# Patient Record
Sex: MALE | Race: OTHER | HISPANIC OR LATINO | Employment: OTHER | URBAN - METROPOLITAN AREA
[De-identification: names, ages, dates, MRNs, and addresses within clinical notes are randomized per-mention and may not be internally consistent; named-entity substitution may affect disease eponyms.]

---

## 2018-05-16 LAB
ABSOL LYMPHOCYTES (HISTORICAL): 3.7 K/UL (ref 0.5–4)
ALBUMIN SERPL BCP-MCNC: 4.3 G/DL (ref 3–5.2)
ALP SERPL-CCNC: 69 U/L (ref 43–122)
ALT SERPL W P-5'-P-CCNC: 24 U/L (ref 9–52)
ANION GAP SERPL CALCULATED.3IONS-SCNC: 9 MMOL/L (ref 5–14)
AST SERPL W P-5'-P-CCNC: 21 U/L (ref 17–59)
BASOPHILS # BLD AUTO: 0.1 K/UL (ref 0–0.1)
BASOPHILS # BLD AUTO: 1 % (ref 0–1)
BILIRUB SERPL-MCNC: 0.5 MG/DL
BUN SERPL-MCNC: 18 MG/DL (ref 5–25)
CALCIUM SERPL-MCNC: 9.6 MG/DL (ref 8.4–10.2)
CHLORIDE SERPL-SCNC: 103 MEQ/L (ref 97–108)
CHOLEST SERPL-MCNC: 144 MG/DL
CHOLEST/HDLC SERPL: 3.9 {RATIO}
CO2 SERPL-SCNC: 31 MMOL/L (ref 22–30)
CREATINE, SERUM (HISTORICAL): 0.88 MG/DL (ref 0.7–1.5)
DEPRECATED RDW RBC AUTO: 15.5 %
EGFR (HISTORICAL): >60 ML/MIN/1.73 M2
EOSINOPHIL # BLD AUTO: 0.3 K/UL (ref 0–0.4)
EOSINOPHIL NFR BLD AUTO: 3 % (ref 0–6)
EST. AVERAGE GLUCOSE BLD GHB EST-MCNC: 128 MG/DL
GLUCOSE SERPL-MCNC: 103 MG/DL (ref 70–99)
HBA1C MFR BLD HPLC: 6.1 %
HCT VFR BLD AUTO: 43.8 % (ref 41–53)
HDLC SERPL-MCNC: 37 MG/DL
HGB BLD-MCNC: 14.3 G/DL (ref 13.5–17.5)
LDL/HDL RATIO (HISTORICAL): 2.4
LDLC SERPL CALC-MCNC: 88 MG/DL
LYMPHOCYTES NFR BLD AUTO: 32 % (ref 25–45)
MCH RBC QN AUTO: 26.5 PG (ref 26–34)
MCHC RBC AUTO-ENTMCNC: 32.6 % (ref 31–36)
MCV RBC AUTO: 81 FL (ref 80–100)
MONOCYTES # BLD AUTO: 0.9 K/UL (ref 0.2–0.9)
MONOCYTES NFR BLD AUTO: 8 % (ref 1–10)
NEUTROPHILS ABS COUNT (HISTORICAL): 6.5 K/UL (ref 1.8–7.8)
NEUTS SEG NFR BLD AUTO: 56 % (ref 45–65)
PLATELET # BLD AUTO: 288 K/MCL (ref 150–450)
POTASSIUM SERPL-SCNC: 4.8 MEQ/L (ref 3.6–5)
PROSTATE SPECIFIC ANTIGEN (HISTORICAL): 1.13 NG/ML
RBC # BLD AUTO: 5.39 M/MCL (ref 4.5–5.9)
SODIUM SERPL-SCNC: 143 MEQ/L (ref 137–147)
TOTAL PROTEIN (HISTORICAL): 7.3 G/DL (ref 5.9–8.4)
TRIGL SERPL-MCNC: 97 MG/DL
VLDLC SERPL CALC-MCNC: 19 MG/DL (ref 0–40)
WBC # BLD AUTO: 11.6 K/MCL (ref 4.5–11)

## 2018-07-18 ENCOUNTER — TELEPHONE (OUTPATIENT)
Dept: FAMILY MEDICINE CLINIC | Facility: CLINIC | Age: 63
End: 2018-07-18

## 2018-07-19 ENCOUNTER — OFFICE VISIT (OUTPATIENT)
Dept: FAMILY MEDICINE CLINIC | Facility: CLINIC | Age: 63
End: 2018-07-19
Payer: COMMERCIAL

## 2018-07-19 VITALS
OXYGEN SATURATION: 99 % | TEMPERATURE: 98.3 F | SYSTOLIC BLOOD PRESSURE: 160 MMHG | HEART RATE: 75 BPM | BODY MASS INDEX: 40.8 KG/M2 | RESPIRATION RATE: 16 BRPM | WEIGHT: 285 LBS | HEIGHT: 70 IN | DIASTOLIC BLOOD PRESSURE: 94 MMHG

## 2018-07-19 DIAGNOSIS — I10 ESSENTIAL HYPERTENSION: ICD-10-CM

## 2018-07-19 DIAGNOSIS — R36.1 HEMATOSPERMIA: Primary | ICD-10-CM

## 2018-07-19 LAB
SL AMB  POCT GLUCOSE, UA: NORMAL
SL AMB LEUKOCYTE ESTERASE,UA: NEGATIVE
SL AMB POCT BILIRUBIN,UA: NEGATIVE
SL AMB POCT BLOOD,UA: 250
SL AMB POCT CLARITY,UA: CLEAR
SL AMB POCT COLOR,UA: YELLOW
SL AMB POCT KETONES,UA: NEGATIVE
SL AMB POCT NITRITE,UA: NEGATIVE
SL AMB POCT PH,UA: 7
SL AMB POCT SPECIFIC GRAVITY,UA: 1
SL AMB POCT URINE PROTEIN: ABNORMAL
SL AMB POCT UROBILINOGEN: NORMAL

## 2018-07-19 PROCEDURE — 3008F BODY MASS INDEX DOCD: CPT | Performed by: PHYSICIAN ASSISTANT

## 2018-07-19 PROCEDURE — 81002 URINALYSIS NONAUTO W/O SCOPE: CPT | Performed by: PHYSICIAN ASSISTANT

## 2018-07-19 PROCEDURE — 99213 OFFICE O/P EST LOW 20 MIN: CPT | Performed by: PHYSICIAN ASSISTANT

## 2018-07-19 PROCEDURE — 87491 CHLMYD TRACH DNA AMP PROBE: CPT | Performed by: PHYSICIAN ASSISTANT

## 2018-07-19 PROCEDURE — 87591 N.GONORRHOEAE DNA AMP PROB: CPT | Performed by: PHYSICIAN ASSISTANT

## 2018-07-19 RX ORDER — LISINOPRIL 30 MG/1
30 TABLET ORAL DAILY
Qty: 30 TABLET | Refills: 2 | Status: SHIPPED | OUTPATIENT
Start: 2018-07-19 | End: 2019-02-28

## 2018-07-19 NOTE — PROGRESS NOTES
Assessment/Plan:    Hematospermia  - explained that many times, isolated episodes of hematospermia can be due to trauma during intercourse and is usually benign   - due to age, will order a PSA to check the prostate   - order ultrasound of testicles and scrotum with views of epididymis to make sure that there are no abnormalities or inflammation of any structures   - will send urine for chlamydia and gonorrhea cultures        Diagnoses and all orders for this visit:    Hematospermia  -     US scrotum and testicles; Future  -     POCT urine dip  -     PSA, total and free; Future  -     Chlamydia/GC amplified DNA by PCR; Future        Subjective: Patient coming in because of noticing blood in his semen  He has noticed this on two occasions- last month, and again this weekend  He only notices this after intercourse, but does not notice any blood in semen with masturbation  He denies pain with urination, frequency, hematuria, and flank pain  Denies fevers and chills  Denies nausea and vomiting  He uses a condom with intercourse  He only has relations with his wife, but says that since she was in [facility] by herself for awhile, he does worry about STDs  Patient ID: Aramis Montoya is a 59 y o  male  HPI    The following portions of the patient's history were reviewed and updated as appropriate: He  has no past medical history on file  Patient Active Problem List    Diagnosis Date Noted    Hematospermia 07/19/2018     He  has no past surgical history on file  His family history is not on file  He  reports that he has quit smoking  He has quit using smokeless tobacco  His alcohol and drug histories are not on file  No current outpatient prescriptions on file  No current facility-administered medications for this visit  No current outpatient prescriptions on file prior to visit  No current facility-administered medications on file prior to visit  He is allergic to lisinopril       Review of Systems   Constitutional: Negative  Respiratory: Negative for shortness of breath  Cardiovascular: Negative for chest pain and palpitations  Gastrointestinal: Negative for abdominal distention, abdominal pain, nausea and vomiting  Genitourinary: Negative for decreased urine volume, difficulty urinating, dysuria, frequency, hematuria, penile pain, penile swelling, scrotal swelling, testicular pain and urgency  Musculoskeletal: Negative for back pain  Skin: Negative for color change and pallor  Neurological: Negative for dizziness  Objective:      /94 (BP Location: Left arm, Patient Position: Sitting, Cuff Size: Adult)   Pulse 75   Temp 98 3 °F (36 8 °C) (Temporal)   Resp 16   Ht 5' 10" (1 778 m)   Wt 129 kg (285 lb)   SpO2 99%   BMI 40 89 kg/m²        Physical Exam   Constitutional: He is oriented to person, place, and time  He appears well-developed and well-nourished  No distress  Neck: Normal range of motion  Neck supple  Cardiovascular: Normal rate, regular rhythm and normal heart sounds  Pulmonary/Chest: Effort normal and breath sounds normal  No respiratory distress  Abdominal: Soft  He exhibits no distension  There is no tenderness  Genitourinary:   Genitourinary Comments: No CVA tenderness    Neurological: He is alert and oriented to person, place, and time  Skin: Skin is warm and dry  He is not diaphoretic

## 2018-07-19 NOTE — ASSESSMENT & PLAN NOTE
- explained that many times, isolated episodes of hematospermia can be due to trauma during intercourse and is usually benign   - due to age, will order a PSA to check the prostate   - order ultrasound of testicles and scrotum with views of epididymis to make sure that there are no abnormalities or inflammation of any structures   - will send urine for chlamydia and gonorrhea cultures

## 2018-07-23 ENCOUNTER — TRANSCRIBE ORDERS (OUTPATIENT)
Dept: ADMINISTRATIVE | Facility: HOSPITAL | Age: 63
End: 2018-07-23

## 2018-07-23 LAB
CHLAMYDIA DNA CVX QL NAA+PROBE: NORMAL
N GONORRHOEA DNA GENITAL QL NAA+PROBE: NORMAL

## 2018-07-24 ENCOUNTER — TELEPHONE (OUTPATIENT)
Dept: FAMILY MEDICINE CLINIC | Facility: CLINIC | Age: 63
End: 2018-07-24

## 2018-08-07 ENCOUNTER — TELEPHONE (OUTPATIENT)
Dept: FAMILY MEDICINE CLINIC | Facility: CLINIC | Age: 63
End: 2018-08-07

## 2019-02-19 ENCOUNTER — TELEPHONE (OUTPATIENT)
Dept: FAMILY MEDICINE CLINIC | Facility: CLINIC | Age: 64
End: 2019-02-19

## 2019-02-19 NOTE — TELEPHONE ENCOUNTER
Pt was informed verbally of frequent no shows, pt was also informed if unable to show to appt to contact office to reschedule or cancel  Pt agreed

## 2019-02-28 ENCOUNTER — OFFICE VISIT (OUTPATIENT)
Dept: FAMILY MEDICINE CLINIC | Facility: CLINIC | Age: 64
End: 2019-02-28

## 2019-02-28 VITALS
HEIGHT: 70 IN | OXYGEN SATURATION: 98 % | WEIGHT: 285 LBS | SYSTOLIC BLOOD PRESSURE: 180 MMHG | BODY MASS INDEX: 40.8 KG/M2 | TEMPERATURE: 96.9 F | DIASTOLIC BLOOD PRESSURE: 90 MMHG | HEART RATE: 96 BPM | RESPIRATION RATE: 18 BRPM

## 2019-02-28 DIAGNOSIS — I10 ESSENTIAL HYPERTENSION: Primary | ICD-10-CM

## 2019-02-28 DIAGNOSIS — E66.01 CLASS 3 SEVERE OBESITY DUE TO EXCESS CALORIES WITH BODY MASS INDEX (BMI) OF 40.0 TO 44.9 IN ADULT, UNSPECIFIED WHETHER SERIOUS COMORBIDITY PRESENT (HCC): ICD-10-CM

## 2019-02-28 PROBLEM — E66.813 CLASS 3 SEVERE OBESITY DUE TO EXCESS CALORIES WITH BODY MASS INDEX (BMI) OF 40.0 TO 44.9 IN ADULT (HCC): Status: ACTIVE | Noted: 2019-02-28

## 2019-02-28 PROCEDURE — 99213 OFFICE O/P EST LOW 20 MIN: CPT | Performed by: PHYSICIAN ASSISTANT

## 2019-02-28 RX ORDER — LISINOPRIL 40 MG/1
40 TABLET ORAL DAILY
Qty: 30 TABLET | Refills: 2 | Status: SHIPPED | OUTPATIENT
Start: 2019-02-28 | End: 2019-05-31 | Stop reason: SDUPTHER

## 2019-02-28 NOTE — ASSESSMENT & PLAN NOTE
- BMI today: 40 89  - Continue walking every day  Encouraged to exercise at least 30 minutes a day, for 5 days a week  -Counseled patient on the importance of working to achieve weight reduction goal  Discussed benefits of weight loss including prevention or control of comorbidities  Discussed role that balanced diet and daily activity play in weight reduction  Set up small attainable weight loss goals  Involve family, friends, and co-workers for support  - Will continue to monitor

## 2019-02-28 NOTE — PROGRESS NOTES
Subjective:     Patient ID: Pamela Medel  is a 62 y o  male with known PMH of HTN who presents today in clinic for follow up of chronic conditions  Patient was last seen in July 2018 by Cyndy Hill PA-C  He has since had frequent no shows  Patient is aware and knows it is important to come to his scheduled appts or to call the office ahead of time if he needs to cancel or reschedule  HTN: Patient was prescribed lisinopril 30mg once daily in July 2018  Today, patient states he is taking lisinopril 20mg, once daily at night  He is not checking his BP at home  Patient states he walks a lot every day  He states he eats well every day  He denies adding salt to his foods, eating canned foods, and only sometimes eats frozen foods  Denies any chest pain, SOB, headaches, dizziness, palpitations or lower leg swelling  Patient states he has a strong family history of high blood pressure, including his mother, father, brother, and sister  He states his uncle just passed away yesterday due to a stroke  His father also has a history of previous MI  Of note, patient states he was in [place] for the past few months  He states he was having blood in his semen and was treated in [place] for this  He states he is no longer having any of these symptoms  He denies any pain or burning with urination, frequency, urgency, or blood in urine  The following portions of the patient's history were reviewed and updated as appropriate: allergies, current medications, past family history, past medical history, past social history, past surgical history and problem list       Med and  Review of Systems   Constitutional: Negative for appetite change, fatigue, fever and unexpected weight change  HENT: Negative for congestion, ear pain, postnasal drip, rhinorrhea and sore throat  Eyes: Negative for pain and visual disturbance  Respiratory: Negative for cough, chest tightness and shortness of breath  Cardiovascular: Negative for chest pain, palpitations and leg swelling  Gastrointestinal: Negative for abdominal pain, constipation, diarrhea, nausea and vomiting  Genitourinary: Negative for difficulty urinating, discharge, dysuria and hematuria  Musculoskeletal: Negative for arthralgias and back pain  Skin: Negative for rash  Neurological: Negative for dizziness, light-headedness, numbness and headaches  Psychiatric/Behavioral: Negative for behavioral problems  The patient is not nervous/anxious  Objective:     Vitals:    02/28/19 0949 02/28/19 1025   BP: (!) 188/90 (!) 180/90   BP Location: Right arm Right arm   Patient Position: Sitting Sitting   Cuff Size: Large Large   Pulse: 96    Resp: 18    Temp: (!) 96 9 °F (36 1 °C)    TempSrc: Temporal    SpO2: 98%    Weight: 129 kg (285 lb)    Height: 5' 10" (1 778 m)            Physical Exam   Constitutional: He is oriented to person, place, and time  He appears well-developed and well-nourished  HENT:   Head: Normocephalic and atraumatic  Right Ear: Tympanic membrane, external ear and ear canal normal    Left Ear: Tympanic membrane, external ear and ear canal normal    Nose: Nose normal    Mouth/Throat: Uvula is midline, oropharynx is clear and moist and mucous membranes are normal    Eyes: Pupils are equal, round, and reactive to light  Conjunctivae and EOM are normal    Neck: Normal range of motion  Neck supple  Carotid bruit is not present  Cardiovascular: Normal rate, regular rhythm, normal heart sounds and intact distal pulses  No murmur heard  Pulmonary/Chest: Effort normal and breath sounds normal  He has no wheezes  Abdominal: Soft  Bowel sounds are normal  There is no tenderness  Musculoskeletal: Normal range of motion  Neurological: He is alert and oriented to person, place, and time  Skin: Skin is warm and dry  Capillary refill takes less than 2 seconds  Psychiatric: He has a normal mood and affect   His behavior is normal    Nursing note and vitals reviewed  Assessment/Plan:    Essential hypertension  - In July 2018, he was prescribed lisinopril 30 mg once daily   - Today, patient states he is taking lisinopril 20 mg once daily at night  He has not been checking his BP at home  - BP recheck today: 180/90  - Patient currently uncontrolled on current regimen  - Counseled on diet and exercise, particularly consumption of low sodium and processed foods   - Educated on home blood pressure logs  - Will increase lisinopril dosage to lisinopril 40mg, once daily    - BP check with nurse in 1 week  - Will reassess next week  If BP is still high, will most likely add another BP medication  Class 3 severe obesity due to excess calories with body mass index (BMI) of 40 0 to 44 9 in adult (HCC)  - BMI today: 40 89  - Continue walking every day  Encouraged to exercise at least 30 minutes a day, for 5 days a week  -Counseled patient on the importance of working to achieve weight reduction goal  Discussed benefits of weight loss including prevention or control of comorbidities  Discussed role that balanced diet and daily activity play in weight reduction  Set up small attainable weight loss goals  Involve family, friends, and co-workers for support  - Will continue to monitor  Diagnoses and all orders for this visit:    Essential hypertension  -     lisinopril (ZESTRIL) 40 mg tablet; Take 1 tablet (40 mg total) by mouth daily    Class 3 severe obesity due to excess calories with body mass index (BMI) of 40 0 to 44 9 in adult, unspecified whether serious comorbidity present [facility])      All of the patient's questions were answered  Patient understands and agrees with the above plan  Return in about 1 week (around 3/7/2019) for Recheck for BP check with nurse  Patient Instructions     Hipertensión   LO QUE NECESITA SABER:   La hipertensión es la presión arterial linh   La presión arterial es la fuerza que ejerce la daniel contra las henderson de las arterias  La presión arterial normal debería estar a menos de 120/80  La pre-hipertensión estaría entre 120/80 y 139/ 80  La presión arterial juliet estaría a 140/90 o más juliet  La hipertensión causa que lares presión arterial se eleve tanto que lares corazón se ve forzado a trabajar [employer] de lo normal  [place] puede dañar lares corazón  Puede controlar la hipertensión con un estilo de leatha saludable o con medicamentos  La presión [place] a proteger clover órganos giana lares corazón, pulmones, cerebro, y riñones  INSTRUCCIONES SOBRE EL JULIET HOSPITALARIA:   Yumiko al 911 en stephany de presentar lo siguiente:   · Usted tiene malestar en el pecho que se siente giana estrujamiento, presión, Liliana Moat o dolor  · Usted se siente confundido o tiene dificultad para hablar  · Repentinamente se siente aturdido o con dificultad para respirar  · Usted tiene dolor o [employer] espalda, [place], [place], abdomen o Bijal Furlong  Regrese a la leann de emergencias si:   · Usted tiene un jennifer dolor de stacy o pérdida de la visión  · Usted tiene debilidad en un brazo o en dion pierna  Pregúntele a lares [place] vitaminas y minerales son adecuados para usted  · Usted se siente mareado, confundido, somnoliento o giana si se fuera a desmayar  · Usted se ha tomado lares medicamento para la presión arterial kelin lares presión arterial todavía está más juliet de lo que le indicó lares médico     · Usted tiene preguntas o inquietudes acerca de lares condición o cuidado  Medicamentos:  Es posible que usted necesite alguno de los siguientes:  · Medicamento  podría usarse para ayudar a disminuir la presión arterial  Es posible que necesite más de un tipo de [place]  [place] el medicamento exactamente giana indicado  · Diuréticos  ayudan a eliminar el exceso de líquido que se acumula en el organismo   [place] contribuirá a bajar lares presión arterial  Es posible que orine más seguido Tracy Godoy medicamento  · Los medicamentos para el colesterol  ayudan a bajar los niveles de [place]  Un nivel bajo de colesterol ayuda a prevenir enfermedades cardíacas y facilita el control de la presión arterial      · [place] clover medicamentos giana se le haya indicado  Consulte con lares médico si usted miki que lares medicamento no le está ayudando o si presenta efectos secundarios  Infórmele si es alérgico a cualquier medicamento  Mantenga dion lista actualizada de los [place], las vitaminas y los productos herbales que dax  Incluya los siguientes datos de los medicamentos: cantidad, frecuencia y motivo de administración  Traiga con usted la lista o los envases de la píldoras a clover citas de seguimiento  Lleve la lista de los medicamentos con usted en stephany de dion emergencia  Acuda a clover consultas de control con lares médico según le indicaron  Usted tendrá que regresar para que le revisen la presión arterial y para que le maryuri otras pruebas de laboratorio  Anote clover preguntas para que se acuerde de hacerlas mariella clover visitas  Maneje lares hipertensión:  Hable con lares médico sobre las siguientes recomendaciones y otras formas de controlar la hipertensión:  · Revise lares presión arterial en casa  Siéntese y descanse por 5 minutos antes de tomarse la presión arterial  Extienda lares brazo y apóyelo en dion superficie plana  Lares brazo debe estar a la misma altura que lares corazón  Siga las instrucciones que vienen con el monitor para la presión arterial o tensiómetro  Si es posible tome por lo menos 2 lecturas de la presión cada vez  Tómese la presión arterial por lo [employer] al día a la misma hora todos los días, dion en la mañana y la otra en la noche  Mantenga un registro de las lecturas de lares presión arterial y llévelo consigo a clover consultas  Pregúntele a lares médico cuál debe ser lares presión arterial            · Limite el sodio (la sal) giana se le haya indicado  Demasiado sodio puede afectar el equilibrio de líquidos  Revise las etiquetas para buscar alimentos bajos en sodio o sin sal agregada  Algunos alimentos bajos en sodio utilizan sales de potasio para añadir sabor  Demasiado potasio también puede causar problemas de [place]  Lares médico le dirá qué cantidad de sodio y potasio es jovel para el consumo en un día  Él puede recomendarle que limite el sodio a 2,300 mg al día  · Siga el plan de comidas recomendado por lares médico   Un dietista o médico puede darle más información sobre planes de bajo contenido de sodio o el plan de alimentación DASH (enfoques dietéticos para detener la hipertensión)  El plan DASH es bajo en sodio, grasas saturadas y grasa total  Es alto en potasio, calcio y [place]  · Ejercítese para mantener un peso saludable  Realice actividad física por lo menos 30 minutos al día, la mayoría de los días de la [place]  [place] ayudará a bajar lares presión arterial  Pregunte a lares médico acerca del mejor plan de ejercicio para usted  · [address] [place] [address] estrés  [place] podría ayudarlo a bajar lares presión arterial  Aprenda sobre formas de relajarse, giana respiración profunda o escuchar música  · Limite el consumo de alcohol  Las mujeres deberían limitar el consumo de alcohol a 1 bebida por día  Los hombres deberían limitar el consumo de alcohol a 2 tragos al día  Un trago equivale a 12 onzas de cerveza, 5 onzas de vino o 1 onza y ½ de licor  · No fume  La nicotina y otros químicos en los cigarrillos y cigarros pueden aumentar lares presión arterial y también pueden provocar daño al pulmón  Pida información a lares médico si usted actualmente fuma y necesita ayuda para dejar de fumar  Los cigarrillos electrónicos o tabaco sin humo todavía contienen nicotina  Consulte con lares médico antes de [employer]  · Controle cualquier otra condición médica que usted tenga  Algunas condiciones médicas giana la diabetes pueden aumentar lares riesgo de hipertensión   Siga las instrucciones de lares médico y tómese clover medicamentos según dichas instrucciones  © 2017 [address] Information is for End User's use only and may not be sold, redistributed or otherwise used for commercial purposes  All illustrations and images included in CareNotes® are the copyrighted property of A D A M , Inc  or Aba Grimes  Esta información es sólo para uso en educación  De [place] intención no es darle un consejo médico sobre enfermedades o tratamientos  Colsulte con de león Angela Poncho farmacéutico antes de seguir cualquier régimen médico para saber si es seguro y efectivo para usted          Emy Briggs PA-C  02/28/19

## 2019-02-28 NOTE — ASSESSMENT & PLAN NOTE
- In July 2018, he was prescribed lisinopril 30 mg once daily   - Today, patient states he is taking lisinopril 20 mg once daily at night  He has not been checking his BP at home  - BP recheck today: 180/90  - Patient currently uncontrolled on current regimen  - Counseled on diet and exercise, particularly consumption of low sodium and processed foods   - Educated on home blood pressure logs  - Will increase lisinopril dosage to lisinopril 40mg, once daily    - BP check with nurse in 1 week  - Will reassess next week  If BP is still high, will most likely add another BP medication

## 2019-02-28 NOTE — PATIENT INSTRUCTIONS
Hipertensión   LO QUE NECESITA SABER:   La hipertensión es la presión arterial juliet  La presión arterial es la fuerza que ejerce la daniel contra las henderson de las arterias  La presión arterial normal debería estar a menos de 120/80  La pre-hipertensión estaría entre 120/80 y 139/ 80  La presión arterial juliet estaría a 140/90 o más juliet  La hipertensión causa que lares presión arterial se eleve tanto que lares corazón se ve forzado a trabajar [employer] de lo normal  [place] puede dañar lares corazón  Puede controlar la hipertensión con un estilo de leatha saludable o con medicamentos  La presión [place] a proteger clover órganos giana lares corazón, pulmones, cerebro, y riñones  INSTRUCCIONES SOBRE EL JULIET HOSPITALARIA:   Llame al 911 en stephany de presentar lo siguiente:   · Usted tiene malestar en el pecho que se siente giana estrujamiento, presión, Fouzia Sous o dolor  · Usted se siente confundido o tiene dificultad para hablar  · Repentinamente se siente aturdido o con dificultad para respirar  · Usted tiene dolor o [employer] espalda, [place], [place], abdomen o Yovany Sickle  Regrese a la leann de emergencias si:   · Usted tiene un jennifer dolor de stacy o pérdida de la visión  · Usted tiene debilidad en un brazo o en dion pierna  Pregúntele a lares Lesle Fetch vitaminas y minerales son adecuados para usted  · Usted se siente mareado, confundido, somnoliento o giana si se fuera a desmayar  · Usted se ha tomado lares medicamento para la presión arterial kelin lares presión arterial todavía está más juliet de lo que le indicó lares médico     · Usted tiene preguntas o inquietudes acerca de lares condición o cuidado  Medicamentos:  Es posible que usted necesite alguno de los siguientes:  · Medicamento  podría usarse para ayudar a disminuir la presión arterial  Es posible que necesite más de un tipo de [place]  [place] el medicamento exactamente giana indicado       · Diuréticos  ayudan a eliminar el exceso de líquido que se acumula en el organismo  [place] contribuirá a bajar lares presión arterial  Es posible que orine más seguido mientras dax matthew medicamento  · Los medicamentos para el colesterol  ayudan a bajar los niveles de [place]  Un nivel bajo de colesterol ayuda a prevenir enfermedades cardíacas y facilita el control de la presión arterial      · [place] clover medicamentos giana se le haya indicado  Consulte con lares médico si usted miki que lares medicamento no le está ayudando o si presenta efectos secundarios  Infórmele si es alérgico a cualquier medicamento  Mantenga dion lista actualizada de los [place], las vitaminas y los productos herbales que dax  Incluya los siguientes datos de los medicamentos: cantidad, frecuencia y motivo de administración  Traiga con usted la lista o los envases de la píldoras a clover citas de seguimiento  Lleve la lista de los medicamentos con usted en stephany de dion emergencia  Acuda a clover consultas de control con lares médico según le indicaron  Usted tendrá que regresar para que le revisen la presión arterial y para que le maryuri otras pruebas de laboratorio  Anote clover preguntas para que se acuerde de hacerlas mariella clover visitas  Maneje lares hipertensión:  Hable con lares médico sobre las siguientes recomendaciones y otras formas de controlar la hipertensión:  · Revise lares presión arterial en casa  Siéntese y descanse por 5 minutos antes de tomarse la presión arterial  Extienda lares brazo y apóyelo en dion superficie plana  Lares brazo debe estar a la misma altura que lares corazón  Siga las instrucciones que vienen con el monitor para la presión arterial o tensiómetro  Si es posible tome por lo menos 2 lecturas de la presión cada vez  Tómese la presión arterial por lo [place] Recomenda al día a la misma hora todos los días, dion en la mañana y la otra en la noche  Mantenga un registro de las lecturas de lares presión arterial y llévelo consigo a clover consultas   Pregúntele a lares médico cuál debe ser lares presión arterial            · Limite el sodio (la sal) giana se le haya indicado  Demasiado sodio puede afectar el equilibrio de líquidos  Revise las etiquetas para buscar alimentos bajos en sodio o sin sal agregada  Algunos alimentos bajos en sodio utilizan sales de potasio para añadir sabor  Demasiado potasio también puede causar problemas de [place]  Lares médico le dirá qué cantidad de sodio y potasio es jovel para el consumo en un día  Él puede recomendarle que limite el sodio a 2,300 mg al día  · Siga el plan de comidas recomendado por lares médico   Un dietista o médico puede darle más información sobre planes de bajo contenido de sodio o el plan de alimentación DASH (enfoques dietéticos para detener la hipertensión)  El plan DASH es bajo en sodio, grasas saturadas y grasa total  Es alto en potasio, calcio y [place]  · Ejercítese para mantener un peso saludable  Realice actividad física por lo menos 30 minutos al día, la mayoría de los días de la [place]  [place] ayudará a bajar lares presión arterial  Pregunte a lares médico acerca del mejor plan de ejercicio para usted  · [address] [place] [address]  [place] podría ayudarlo a bajar lares presión arterial  Aprenda sobre formas de relajarse, giana respiración profunda o escuchar música  · Limite el consumo de alcohol  Las mujeres deberían limitar el consumo de alcohol a 1 bebida por día  Los hombres deberían limitar el consumo de alcohol a 2 tragos al día  Un trago equivale a 12 onzas de cerveza, 5 onzas de vino o 1 onza y ½ de licor  · No fume  La nicotina y otros químicos en los cigarrillos y cigarros pueden aumentar lares presión arterial y también pueden provocar daño al pulmón  Pida información a lares médico si usted actualmente fuma y necesita ayuda para dejar de fumar  Los cigarrillos electrónicos o tabaco sin humo todavía contienen nicotina  Consulte con lares médico antes de [employer]  · Controle cualquier otra condición médica que usted tenga  Algunas condiciones médicas giana la diabetes pueden aumentar de león riesgo de hipertensión  [address] de león médico y tómese clover medicamentos según dichas instrucciones  © 2017 [address] Information is for End User's use only and may not be sold, redistributed or otherwise used for commercial purposes  All illustrations and images included in CareNotes® are the copyrighted property of A D A M , Inc  or Aba Grimes  Esta información es sólo para uso en educación  De [place] intención no es darle un consejo médico sobre enfermedades o tratamientos  Colsulte con de león [place]a [place] farmacéutico antes de seguir cualquier régimen médico para saber si es seguro y efectivo para usted

## 2019-03-07 ENCOUNTER — CLINICAL SUPPORT (OUTPATIENT)
Dept: FAMILY MEDICINE CLINIC | Facility: CLINIC | Age: 64
End: 2019-03-07

## 2019-03-07 VITALS — SYSTOLIC BLOOD PRESSURE: 188 MMHG | DIASTOLIC BLOOD PRESSURE: 90 MMHG

## 2019-03-07 DIAGNOSIS — I10 HYPERTENSION, UNSPECIFIED TYPE: Primary | ICD-10-CM

## 2019-03-07 RX ORDER — HYDROCHLOROTHIAZIDE 12.5 MG/1
12.5 TABLET ORAL DAILY
Qty: 30 TABLET | Refills: 1 | Status: SHIPPED | OUTPATIENT
Start: 2019-03-07 | End: 2019-03-14

## 2019-03-14 ENCOUNTER — APPOINTMENT (OUTPATIENT)
Dept: LAB | Facility: HOSPITAL | Age: 64
End: 2019-03-14
Payer: COMMERCIAL

## 2019-03-14 ENCOUNTER — OFFICE VISIT (OUTPATIENT)
Dept: FAMILY MEDICINE CLINIC | Facility: CLINIC | Age: 64
End: 2019-03-14

## 2019-03-14 ENCOUNTER — TELEPHONE (OUTPATIENT)
Dept: FAMILY MEDICINE CLINIC | Facility: CLINIC | Age: 64
End: 2019-03-14

## 2019-03-14 VITALS
SYSTOLIC BLOOD PRESSURE: 180 MMHG | RESPIRATION RATE: 16 BRPM | DIASTOLIC BLOOD PRESSURE: 98 MMHG | OXYGEN SATURATION: 99 % | WEIGHT: 281 LBS | BODY MASS INDEX: 40.32 KG/M2 | HEART RATE: 79 BPM

## 2019-03-14 DIAGNOSIS — I10 ESSENTIAL HYPERTENSION: ICD-10-CM

## 2019-03-14 DIAGNOSIS — R73.03 PREDIABETES: ICD-10-CM

## 2019-03-14 DIAGNOSIS — Z11.59 SCREENING FOR VIRAL DISEASE: ICD-10-CM

## 2019-03-14 DIAGNOSIS — E66.01 CLASS 3 SEVERE OBESITY DUE TO EXCESS CALORIES WITH BODY MASS INDEX (BMI) OF 40.0 TO 44.9 IN ADULT, UNSPECIFIED WHETHER SERIOUS COMORBIDITY PRESENT (HCC): ICD-10-CM

## 2019-03-14 DIAGNOSIS — Z12.11 ENCOUNTER FOR SCREENING COLONOSCOPY: ICD-10-CM

## 2019-03-14 DIAGNOSIS — E66.01 CLASS 3 SEVERE OBESITY DUE TO EXCESS CALORIES WITH BODY MASS INDEX (BMI) OF 40.0 TO 44.9 IN ADULT, UNSPECIFIED WHETHER SERIOUS COMORBIDITY PRESENT (HCC): Primary | ICD-10-CM

## 2019-03-14 DIAGNOSIS — I10 ESSENTIAL HYPERTENSION: Primary | ICD-10-CM

## 2019-03-14 LAB
ALBUMIN SERPL BCP-MCNC: 4.5 G/DL (ref 3–5.2)
ALP SERPL-CCNC: 61 U/L (ref 43–122)
ALT SERPL W P-5'-P-CCNC: 20 U/L (ref 9–52)
ANION GAP SERPL CALCULATED.3IONS-SCNC: 9 MMOL/L (ref 5–14)
AST SERPL W P-5'-P-CCNC: 22 U/L (ref 17–59)
BASOPHILS # BLD AUTO: 0.1 THOUSANDS/ΜL (ref 0–0.1)
BASOPHILS NFR BLD AUTO: 1 % (ref 0–1)
BILIRUB SERPL-MCNC: 0.4 MG/DL
BUN SERPL-MCNC: 18 MG/DL (ref 5–25)
CALCIUM SERPL-MCNC: 9.8 MG/DL (ref 8.4–10.2)
CHLORIDE SERPL-SCNC: 100 MMOL/L (ref 97–108)
CHOLEST SERPL-MCNC: 149 MG/DL
CO2 SERPL-SCNC: 31 MMOL/L (ref 22–30)
CREAT SERPL-MCNC: 0.91 MG/DL (ref 0.7–1.5)
EOSINOPHIL # BLD AUTO: 0.3 THOUSAND/ΜL (ref 0–0.4)
EOSINOPHIL NFR BLD AUTO: 2 % (ref 0–6)
ERYTHROCYTE [DISTWIDTH] IN BLOOD BY AUTOMATED COUNT: 15.6 %
EST. AVERAGE GLUCOSE BLD GHB EST-MCNC: 128 MG/DL
GFR SERPL CREATININE-BSD FRML MDRD: 89 ML/MIN/1.73SQ M
GLUCOSE SERPL-MCNC: 109 MG/DL (ref 70–99)
HBA1C MFR BLD: 6.1 % (ref 4.2–6.3)
HCT VFR BLD AUTO: 46.7 % (ref 41–53)
HDLC SERPL-MCNC: 32 MG/DL (ref 40–59)
HGB BLD-MCNC: 14.8 G/DL (ref 13.5–17.5)
LDLC SERPL CALC-MCNC: 93 MG/DL
LYMPHOCYTES # BLD AUTO: 2.9 THOUSANDS/ΜL (ref 0.5–4)
LYMPHOCYTES NFR BLD AUTO: 26 % (ref 25–45)
MCH RBC QN AUTO: 26.3 PG (ref 26–34)
MCHC RBC AUTO-ENTMCNC: 31.7 G/DL (ref 31–36)
MCV RBC AUTO: 83 FL (ref 80–100)
MONOCYTES # BLD AUTO: 0.7 THOUSAND/ΜL (ref 0.2–0.9)
MONOCYTES NFR BLD AUTO: 6 % (ref 1–10)
NEUTROPHILS # BLD AUTO: 7.4 THOUSANDS/ΜL (ref 1.8–7.8)
NEUTS SEG NFR BLD AUTO: 65 % (ref 45–65)
NONHDLC SERPL-MCNC: 117 MG/DL
PLATELET # BLD AUTO: 286 THOUSANDS/UL (ref 150–450)
PMV BLD AUTO: 8.4 FL (ref 8.9–12.7)
POTASSIUM SERPL-SCNC: 4.1 MMOL/L (ref 3.6–5)
PROT SERPL-MCNC: 7.8 G/DL (ref 5.9–8.4)
RBC # BLD AUTO: 5.61 MILLION/UL (ref 4.5–5.9)
SODIUM SERPL-SCNC: 140 MMOL/L (ref 137–147)
TRIGL SERPL-MCNC: 119 MG/DL
TSH SERPL DL<=0.05 MIU/L-ACNC: 1.16 UIU/ML (ref 0.47–4.68)
WBC # BLD AUTO: 11.4 THOUSAND/UL (ref 4.5–11)

## 2019-03-14 PROCEDURE — 84443 ASSAY THYROID STIM HORMONE: CPT

## 2019-03-14 PROCEDURE — 80061 LIPID PANEL: CPT

## 2019-03-14 PROCEDURE — 85025 COMPLETE CBC W/AUTO DIFF WBC: CPT

## 2019-03-14 PROCEDURE — 99213 OFFICE O/P EST LOW 20 MIN: CPT | Performed by: FAMILY MEDICINE

## 2019-03-14 PROCEDURE — 36415 COLL VENOUS BLD VENIPUNCTURE: CPT

## 2019-03-14 PROCEDURE — 83036 HEMOGLOBIN GLYCOSYLATED A1C: CPT

## 2019-03-14 PROCEDURE — 87389 HIV-1 AG W/HIV-1&-2 AB AG IA: CPT

## 2019-03-14 PROCEDURE — 80053 COMPREHEN METABOLIC PANEL: CPT

## 2019-03-14 RX ORDER — HYDROCHLOROTHIAZIDE 25 MG/1
25 TABLET ORAL DAILY
Qty: 30 TABLET | Refills: 3 | Status: SHIPPED | OUTPATIENT
Start: 2019-03-14 | End: 2019-07-09 | Stop reason: SDUPTHER

## 2019-03-14 RX ORDER — ATORVASTATIN CALCIUM 20 MG/1
20 TABLET, FILM COATED ORAL DAILY
Qty: 30 TABLET | Refills: 3 | Status: SHIPPED | OUTPATIENT
Start: 2019-03-14 | End: 2019-07-09 | Stop reason: SDUPTHER

## 2019-03-14 NOTE — ASSESSMENT & PLAN NOTE
He needs labs before any change in bp meds is done  Continue lisinopril 40mg daily  Discussed the plan to increase HCTZ to 25mg daily after labs are done  Will get repeat labs 2 weeks after HCTZ change is made  He had sleep disturbance and nightmares with amlodipine

## 2019-03-14 NOTE — TELEPHONE ENCOUNTER
Can you call this English only speaking patient and let him know that I sent the medications we discussed to the pharmacy   Thank you

## 2019-03-14 NOTE — PROGRESS NOTES
Assessment/Plan:    Screening for viral disease  One time HIV ordered today  Encounter for screening colonoscopy  Referral ordered today  Prediabetes  Get hbA1C  Class 3 severe obesity due to excess calories with body mass index (BMI) of 40 0 to 44 9 in adult [facility])  Counseled patient on the importance of working to achieve weight reduction goal   Discussed benefits of weight loss including prevention or control of comorbidities  Discussed role that balanced diet and daily activity play in weight reduction  Set up small  attainable weight loss goals  Involve family, friends, and co-workers for support  Exercise should be 30 minutes 5 times weekly of moderate intensity activity- brisk walking acceptable  Recommended diet include mediterranean and DASH  Primary focus should be unprocessed foods, fruits, vegetables, plant based fats and protein, legumes, whole grain and nuts  Vegetables and fruit should make up 1/2 each meal  Limit red meats  Get lipid panel  His ascvd risk is 20 9  After baseline lipids will start atorvastatin 20mg daily  Discussed side effects with patient  Essential hypertension  He needs labs before any change in bp meds is done  Continue lisinopril 40mg daily  Discussed the plan to increase HCTZ to 25mg daily after labs are done  Will get repeat labs 2 weeks after HCTZ change is made  He had sleep disturbance and nightmares with amlodipine  Return for 2 weeks bp check and 1 month visit with doctor  There are no Patient Instructions on file for this visit  Diagnoses and all orders for this visit:    Class 3 severe obesity due to excess calories with body mass index (BMI) of 40 0 to 44 9 in adult, unspecified whether serious comorbidity present ([facility] [address] )  -     Lipid panel; Future  -     TSH, 3rd generation with Free T4 reflex; Future    Essential hypertension  -     Comprehensive metabolic panel;  Future  -     CBC and differential; Future    Prediabetes  - HEMOGLOBIN A1C W/ EAG ESTIMATION; Future    Screening for viral disease  -     HIV 1/2 AG-AB combo; Future    Encounter for screening colonoscopy  -     Ambulatory referral to Gastroenterology; Future          Subjective:     Foster Shipman is a 62 y o  male who  has a past medical history of Hypertension  who presented to the office today for bp check  HPI  He was previously on lisinopril 20mg and 2 weeks ago it was increased to 40mg daily  About 1 week ago he was started on HCTZ 12 5  His bp remains uncontrolled  He has not had lab work done for a year  He states he was having repeat colonoscopies in [place] for an unknown reason and he was due for a repeat last year  Current Outpatient Medications on File Prior to Visit   Medication Sig Dispense Refill    hydrochlorothiazide (HYDRODIURIL) 12 5 mg tablet Take 1 tablet (12 5 mg total) by mouth daily 30 tablet 1    lisinopril (ZESTRIL) 40 mg tablet Take 1 tablet (40 mg total) by mouth daily 30 tablet 2     No current facility-administered medications on file prior to visit  Past Medical History:   Diagnosis Date    Hypertension      No past surgical history on file    Social History     Socioeconomic History    Marital status: /Civil Union     Spouse name: None    Number of children: None    Years of education: None    Highest education level: None   Occupational History    None   Social Needs    Financial resource strain: None    Food insecurity:     Worry: None     Inability: None    Transportation needs:     Medical: None     Non-medical: None   Tobacco Use    Smoking status: Former Smoker    Smokeless tobacco: Former User   Substance and Sexual Activity    Alcohol use: None    Drug use: None    Sexual activity: None   Lifestyle    Physical activity:     Days per week: None     Minutes per session: None    Stress: None   Relationships    Social connections:     Talks on phone: None     Gets together: None     Attends Hoahaoism service: None     Active member of club or organization: None     Attends meetings of clubs or organizations: None     Relationship status: None    Intimate partner violence:     Fear of current or ex partner: None     Emotionally abused: None     Physically abused: None     Forced sexual activity: None   Other Topics Concern    None   Social History Narrative    None     Family History   Problem Relation Age of Onset    Hypertension Mother     Coronary artery disease Mother     Hypertension Father     Coronary artery disease Father     Hypertension Sister     Coronary artery disease Sister     Hypertension Brother     Coronary artery disease Brother     Hypertension Paternal Uncle     Stroke Paternal Uncle          Review of Systems   Constitutional: Negative for activity change, appetite change, fever and unexpected weight change  HENT: Negative for ear pain, postnasal drip and rhinorrhea  Eyes: Negative for photophobia and pain  Respiratory: Negative for cough, shortness of breath and wheezing  Cardiovascular: Negative for chest pain, palpitations and leg swelling  Gastrointestinal: Negative for abdominal pain, blood in stool, nausea and vomiting  Endocrine: Negative for polydipsia and polyuria  Genitourinary: Negative for difficulty urinating, hematuria and urgency  Musculoskeletal: Negative for myalgias  Skin: Negative for rash  Neurological: Negative for dizziness  Psychiatric/Behavioral: Negative for confusion and sleep disturbance  Objective:    BP (!) 180/98 (BP Location: Right arm, Patient Position: Sitting, Cuff Size: Large)   Pulse 79   Resp 16   Wt 127 kg (281 lb)   SpO2 99%   BMI 40 32 kg/m²     Physical Exam   Constitutional: He is oriented to person, place, and time  He appears well-developed and well-nourished  HENT:   Head: Normocephalic and atraumatic  Mouth/Throat: No oropharyngeal exudate     Eyes: Pupils are equal, round, and reactive to light  Conjunctivae and EOM are normal    Neck: Normal range of motion  Neck supple  Cardiovascular: Normal rate, regular rhythm and normal heart sounds  Exam reveals no gallop and no friction rub  No murmur heard  Pulmonary/Chest: Effort normal and breath sounds normal  No respiratory distress  He has no wheezes  He has no rales  He exhibits no tenderness  Abdominal: Soft  Bowel sounds are normal  He exhibits no distension and no mass  There is no tenderness  There is no rebound  Musculoskeletal: Normal range of motion  He exhibits no edema  Neurological: He is alert and oriented to person, place, and time  Skin: Skin is warm and dry  Psychiatric: He has a normal mood and affect         Stanford Gill MD  03/14/19  9:59 AM

## 2019-03-14 NOTE — ASSESSMENT & PLAN NOTE
Counseled patient on the importance of working to achieve weight reduction goal   Discussed benefits of weight loss including prevention or control of comorbidities  Discussed role that balanced diet and daily activity play in weight reduction  Set up small  attainable weight loss goals  Involve family, friends, and co-workers for support  Exercise should be 30 minutes 5 times weekly of moderate intensity activity- brisk walking acceptable  Recommended diet include mediterranean and DASH  Primary focus should be unprocessed foods, fruits, vegetables, plant based fats and protein, legumes, whole grain and nuts  Vegetables and fruit should make up 1/2 each meal  Limit red meats  Get lipid panel  His ascvd risk is 20 9  After baseline lipids will start atorvastatin 20mg daily  Discussed side effects with patient

## 2019-03-16 LAB — HIV 1+2 AB+HIV1 P24 AG SERPL QL IA: NORMAL

## 2019-03-28 ENCOUNTER — CLINICAL SUPPORT (OUTPATIENT)
Dept: FAMILY MEDICINE CLINIC | Facility: CLINIC | Age: 64
End: 2019-03-28

## 2019-03-28 VITALS — DIASTOLIC BLOOD PRESSURE: 82 MMHG | HEART RATE: 75 BPM | SYSTOLIC BLOOD PRESSURE: 168 MMHG

## 2019-03-28 DIAGNOSIS — I10 ESSENTIAL HYPERTENSION: Primary | ICD-10-CM

## 2019-03-28 NOTE — PROGRESS NOTES
Some improvement but diastolic still elevated  Pt instructed that he will receive a phone call if anything is to be changed prior to his next visit

## 2019-04-10 ENCOUNTER — APPOINTMENT (OUTPATIENT)
Dept: LAB | Facility: HOSPITAL | Age: 64
End: 2019-04-10
Payer: COMMERCIAL

## 2019-04-10 DIAGNOSIS — R36.1 HEMATOSPERMIA: ICD-10-CM

## 2019-04-10 DIAGNOSIS — I10 ESSENTIAL HYPERTENSION: ICD-10-CM

## 2019-04-10 LAB
ALBUMIN SERPL BCP-MCNC: 4.5 G/DL (ref 3–5.2)
ANION GAP SERPL CALCULATED.3IONS-SCNC: 10 MMOL/L (ref 5–14)
BUN SERPL-MCNC: 18 MG/DL (ref 5–25)
CALCIUM ALBUM COR SERPL-MCNC: 9.8 MG/DL (ref 8.3–10.1)
CALCIUM SERPL-MCNC: 10.2 MG/DL (ref 8.3–10.1)
CALCIUM SERPL-MCNC: 10.2 MG/DL (ref 8.4–10.2)
CHLORIDE SERPL-SCNC: 101 MMOL/L (ref 97–108)
CO2 SERPL-SCNC: 29 MMOL/L (ref 22–30)
CREAT SERPL-MCNC: 0.9 MG/DL (ref 0.7–1.5)
GFR SERPL CREATININE-BSD FRML MDRD: 91 ML/MIN/1.73SQ M
GLUCOSE SERPL-MCNC: 101 MG/DL (ref 70–99)
POTASSIUM SERPL-SCNC: 4.7 MMOL/L (ref 3.6–5)
SODIUM SERPL-SCNC: 140 MMOL/L (ref 137–147)

## 2019-04-10 PROCEDURE — 36415 COLL VENOUS BLD VENIPUNCTURE: CPT

## 2019-04-10 PROCEDURE — 84154 ASSAY OF PSA FREE: CPT

## 2019-04-10 PROCEDURE — 82040 ASSAY OF SERUM ALBUMIN: CPT

## 2019-04-10 PROCEDURE — 84153 ASSAY OF PSA TOTAL: CPT

## 2019-04-10 PROCEDURE — 80048 BASIC METABOLIC PNL TOTAL CA: CPT

## 2019-04-11 ENCOUNTER — OFFICE VISIT (OUTPATIENT)
Dept: FAMILY MEDICINE CLINIC | Facility: CLINIC | Age: 64
End: 2019-04-11

## 2019-04-11 VITALS
WEIGHT: 286 LBS | DIASTOLIC BLOOD PRESSURE: 80 MMHG | BODY MASS INDEX: 41.04 KG/M2 | TEMPERATURE: 97.7 F | RESPIRATION RATE: 20 BRPM | SYSTOLIC BLOOD PRESSURE: 138 MMHG | OXYGEN SATURATION: 97 % | HEART RATE: 74 BPM

## 2019-04-11 DIAGNOSIS — Z12.11 SCREENING FOR COLON CANCER: Primary | ICD-10-CM

## 2019-04-11 DIAGNOSIS — I10 ESSENTIAL HYPERTENSION: ICD-10-CM

## 2019-04-11 DIAGNOSIS — E66.01 CLASS 3 SEVERE OBESITY DUE TO EXCESS CALORIES WITH BODY MASS INDEX (BMI) OF 40.0 TO 44.9 IN ADULT, UNSPECIFIED WHETHER SERIOUS COMORBIDITY PRESENT (HCC): ICD-10-CM

## 2019-04-11 DIAGNOSIS — R73.03 PREDIABETES: ICD-10-CM

## 2019-04-11 LAB
PSA FREE MFR SERPL: 30 %
PSA FREE SERPL-MCNC: 0.42 NG/ML
PSA SERPL-MCNC: 1.4 NG/ML (ref 0–4)

## 2019-04-11 PROCEDURE — 99213 OFFICE O/P EST LOW 20 MIN: CPT | Performed by: INTERNAL MEDICINE

## 2019-04-11 PROCEDURE — 3079F DIAST BP 80-89 MM HG: CPT | Performed by: INTERNAL MEDICINE

## 2019-04-11 PROCEDURE — 3075F SYST BP GE 130 - 139MM HG: CPT | Performed by: INTERNAL MEDICINE

## 2019-05-31 ENCOUNTER — TELEPHONE (OUTPATIENT)
Dept: FAMILY MEDICINE CLINIC | Facility: CLINIC | Age: 64
End: 2019-05-31

## 2019-05-31 DIAGNOSIS — I10 ESSENTIAL HYPERTENSION: ICD-10-CM

## 2019-06-03 RX ORDER — LISINOPRIL 40 MG/1
40 TABLET ORAL DAILY
Qty: 30 TABLET | Refills: 2 | Status: SHIPPED | OUTPATIENT
Start: 2019-06-03 | End: 2019-12-11 | Stop reason: SDUPTHER

## 2019-07-09 DIAGNOSIS — I10 ESSENTIAL HYPERTENSION: ICD-10-CM

## 2019-07-10 ENCOUNTER — OFFICE VISIT (OUTPATIENT)
Dept: FAMILY MEDICINE CLINIC | Facility: CLINIC | Age: 64
End: 2019-07-10

## 2019-07-10 VITALS
HEART RATE: 88 BPM | DIASTOLIC BLOOD PRESSURE: 80 MMHG | TEMPERATURE: 97 F | OXYGEN SATURATION: 99 % | RESPIRATION RATE: 18 BRPM | WEIGHT: 291 LBS | BODY MASS INDEX: 43.1 KG/M2 | SYSTOLIC BLOOD PRESSURE: 170 MMHG | HEIGHT: 69 IN

## 2019-07-10 DIAGNOSIS — Z12.11 SCREENING FOR COLON CANCER: ICD-10-CM

## 2019-07-10 DIAGNOSIS — I10 ESSENTIAL HYPERTENSION: Primary | ICD-10-CM

## 2019-07-10 DIAGNOSIS — E66.01 CLASS 3 SEVERE OBESITY DUE TO EXCESS CALORIES WITH BODY MASS INDEX (BMI) OF 40.0 TO 44.9 IN ADULT, UNSPECIFIED WHETHER SERIOUS COMORBIDITY PRESENT (HCC): ICD-10-CM

## 2019-07-10 PROCEDURE — 99214 OFFICE O/P EST MOD 30 MIN: CPT | Performed by: FAMILY MEDICINE

## 2019-07-10 PROCEDURE — 3725F SCREEN DEPRESSION PERFORMED: CPT | Performed by: FAMILY MEDICINE

## 2019-07-10 RX ORDER — HYDROCHLOROTHIAZIDE 25 MG/1
25 TABLET ORAL DAILY
Qty: 30 TABLET | Refills: 3 | Status: SHIPPED | OUTPATIENT
Start: 2019-07-10 | End: 2019-10-15 | Stop reason: CLARIF

## 2019-07-10 RX ORDER — ATORVASTATIN CALCIUM 20 MG/1
20 TABLET, FILM COATED ORAL DAILY
Qty: 30 TABLET | Refills: 3 | Status: SHIPPED | OUTPATIENT
Start: 2019-07-10 | End: 2019-10-23 | Stop reason: SDUPTHER

## 2019-07-11 NOTE — PROGRESS NOTES
Assessment/Plan:    Essential hypertension  Pt presents for f/u of HTN  Reports compliance w/ HCTZ and Lisinopril however on intial presentation bp 200/80  Repeat blood pressure was evaluated 30 mins after initial measure, 170/80  Pt presented w/ daily measurements, ranging -150s and DBP 80-90s  Suspecting that this reading was 2/2 to pt's walking to appointment and is incidental as daily readings are at goal per [facility]NC guidelines  Have advised pt to f/u in 1 week for nurse visit to re-evaluate  Class 3 severe obesity due to excess calories with body mass index (BMI) of 40 0 to 44 9 in adult ([facility])  Pt has sustained 5 lb weight gain since last visit  Pavellorkorey present for visit stated that she has noticed him making changes to his diet by including more fruits and vegetables  BMI 42 kg/m2  I have recommended trial of "plant-based" eating; avoiding dairy and animal-based protein  Pt has expressed desire for assistance w/ weight loss and is interested in bariatric procedure  I have informed him that prior to procedure  he will have to sustain weight loss and regulated diet specified by Bariatrics  Referral has been placed to bariatric surgery, would appreciate their input  Diagnoses and all orders for this visit:    Essential hypertension    Screening for colon cancer    Class 3 severe obesity due to excess calories with body mass index (BMI) of 40 0 to 44 9 in adult, unspecified whether serious comorbidity present [facility])  -     Ambulatory referral to Bariatric Surgery; Future    Other orders  -     Cancel: Ambulatory referral to Gastroenterology; Future          Subjective:      Patient ID: Bijal Thomas is a 62 y o  male  Bijal Thomas is a 62 y o  Male, PMH significant for prediabetes, HTN and morbid obesity, presents to the clinic for f/u of HTN  He has no concerns regarding blood pressure, and has been compliant w/ medications   Has been complaint w/ bp checks and has been checking approx 4 times daily  Denies current chest pain, SOB, headaches or visual disturbance  Dwight is present for visit and is providing translation; states tht he has been watching his diet and been eating more fruits and vegetables, and drinking more water v s  [place]  The following portions of the patient's history were reviewed and updated as appropriate: He  has a past medical history of Hypertension  Patient Active Problem List    Diagnosis Date Noted    Prediabetes 03/14/2019    Encounter for screening colonoscopy 03/14/2019    Screening for viral disease 03/14/2019    Class 3 severe obesity due to excess calories with body mass index (BMI) of 40 0 to 44 9 in adult [facility]) 02/28/2019    Hematospermia 07/19/2018    Essential hypertension 07/19/2018     He  has no past surgical history on file  His family history includes Coronary artery disease in his brother, father, mother, and sister; Hypertension in his brother, father, mother, paternal uncle, and sister; Stroke in his paternal uncle  He  reports that he has quit smoking  He has quit using smokeless tobacco  His alcohol and drug histories are not on file  Current Outpatient Medications   Medication Sig Dispense Refill    lisinopril (ZESTRIL) 40 mg tablet Take 1 tablet (40 mg total) by mouth daily 30 tablet 2    atorvastatin (LIPITOR) 20 mg tablet Take 1 tablet (20 mg total) by mouth daily 30 tablet 3    hydrochlorothiazide (HYDRODIURIL) 25 mg tablet Take 1 tablet (25 mg total) by mouth daily 30 tablet 3     No current facility-administered medications for this visit        Current Outpatient Medications on File Prior to Visit   Medication Sig    lisinopril (ZESTRIL) 40 mg tablet Take 1 tablet (40 mg total) by mouth daily    atorvastatin (LIPITOR) 20 mg tablet Take 1 tablet (20 mg total) by mouth daily    hydrochlorothiazide (HYDRODIURIL) 25 mg tablet Take 1 tablet (25 mg total) by mouth daily     No current facility-administered medications on file prior to visit  He is allergic to amlodipine       Review of Systems   Constitutional: Negative for appetite change, diaphoresis and fever  Respiratory: Negative for cough, shortness of breath and wheezing  Cardiovascular: Negative for chest pain and palpitations  Gastrointestinal: Negative for abdominal pain, constipation and diarrhea  Neurological: Negative for dizziness and headaches  Objective:      BP (!) 200/80 (BP Location: Left arm, Patient Position: Sitting, Cuff Size: Large)   Pulse 88   Temp (!) 97 °F (36 1 °C) (Temporal)   Resp 18   Ht 5' 9" (1 753 m)   Wt 132 kg (291 lb)   SpO2 99%   BMI 42 97 kg/m²          Physical Exam   Constitutional: He appears well-developed and well-nourished  No distress  HENT:   Right Ear: External ear normal    Left Ear: External ear normal    Nose: Nose normal    Mouth/Throat: Oropharynx is clear and moist    Eyes: Conjunctivae and EOM are normal    Cardiovascular: Normal rate, regular rhythm and normal heart sounds  Pulmonary/Chest: Effort normal and breath sounds normal  No respiratory distress  He has no wheezes  Musculoskeletal: He exhibits no edema or tenderness  Neurological: He is alert  Skin: Skin is warm and dry  He is not diaphoretic  Vitals reviewed

## 2019-07-11 NOTE — ASSESSMENT & PLAN NOTE
Pt has sustained 5 lb weight gain since last visit  Dwight present for visit stated that she has noticed him making changes to his diet by including more fruits and vegetables  BMI 42 kg/m2  I have recommended trial of "plant-based" eating; avoiding dairy and animal-based protein  Pt has expressed desire for assistance w/ weight loss and is interested in bariatric procedure  I have informed him that prior to procedure  he will have to sustain weight loss and regulated diet specified by Bariatrics  Referral has been placed to bariatric surgery, would appreciate their input

## 2019-07-11 NOTE — ASSESSMENT & PLAN NOTE
Pt presents for f/u of HTN  Reports compliance w/ HCTZ and Lisinopril however on intial presentation bp 200/80  Repeat blood pressure was evaluated 30 mins after initial measure, 170/80  Pt presented w/ daily measurements, ranging -150s and DBP 80-90s  Suspecting that this reading was 2/2 to pt's walking to appointment and is incidental as daily readings are at goal per [facility] guidelines  Have advised pt to f/u in 1 week for nurse visit to re-evaluate  Normal for race

## 2019-08-26 ENCOUNTER — APPOINTMENT (EMERGENCY)
Dept: CT IMAGING | Facility: HOSPITAL | Age: 64
End: 2019-08-26
Payer: COMMERCIAL

## 2019-08-26 ENCOUNTER — HOSPITAL ENCOUNTER (EMERGENCY)
Facility: HOSPITAL | Age: 64
Discharge: HOME | End: 2019-08-26
Attending: EMERGENCY MEDICINE | Admitting: EMERGENCY MEDICINE
Payer: COMMERCIAL

## 2019-08-26 VITALS
WEIGHT: 290 LBS | BODY MASS INDEX: 42.83 KG/M2 | TEMPERATURE: 97.7 F | HEART RATE: 87 BPM | OXYGEN SATURATION: 98 % | RESPIRATION RATE: 18 BRPM | DIASTOLIC BLOOD PRESSURE: 84 MMHG | SYSTOLIC BLOOD PRESSURE: 167 MMHG

## 2019-08-26 DIAGNOSIS — R10.9 ABDOMINAL PAIN: Primary | ICD-10-CM

## 2019-08-26 DIAGNOSIS — I10 HYPERTENSION: ICD-10-CM

## 2019-08-26 DIAGNOSIS — E27.8 ADRENAL NODULE (HCC): ICD-10-CM

## 2019-08-26 DIAGNOSIS — R73.9 HYPERGLYCEMIA: ICD-10-CM

## 2019-08-26 DIAGNOSIS — K52.9 GASTROENTERITIS: ICD-10-CM

## 2019-08-26 LAB
ALBUMIN SERPL BCP-MCNC: 4.1 G/DL (ref 3–5.2)
ALP SERPL-CCNC: 53 U/L (ref 43–122)
ALT SERPL W P-5'-P-CCNC: 17 U/L (ref 9–52)
AMORPH URATE CRY URNS QL MICRO: ABNORMAL /HPF
ANION GAP SERPL CALCULATED.3IONS-SCNC: 9 MMOL/L (ref 5–14)
AST SERPL W P-5'-P-CCNC: 35 U/L (ref 17–59)
BACTERIA UR QL AUTO: ABNORMAL /HPF
BILIRUB SERPL-MCNC: 0.5 MG/DL
BILIRUB UR QL STRIP: NEGATIVE
BUN SERPL-MCNC: 19 MG/DL (ref 5–25)
CALCIUM SERPL-MCNC: 9.3 MG/DL (ref 8.4–10.2)
CHLORIDE SERPL-SCNC: 99 MMOL/L (ref 97–108)
CLARITY UR: CLEAR
CO2 SERPL-SCNC: 26 MMOL/L (ref 22–30)
COLOR UR: ABNORMAL
CREAT SERPL-MCNC: 0.67 MG/DL (ref 0.7–1.5)
ERYTHROCYTE [DISTWIDTH] IN BLOOD BY AUTOMATED COUNT: 15.6 %
GFR SERPL CREATININE-BSD FRML MDRD: 102 ML/MIN/1.73SQ M
GLUCOSE SERPL-MCNC: 163 MG/DL (ref 70–99)
GLUCOSE UR STRIP-MCNC: NEGATIVE MG/DL
HCT VFR BLD AUTO: 48.2 % (ref 41–53)
HGB BLD-MCNC: 15.7 G/DL (ref 13.5–17.5)
HGB UR QL STRIP.AUTO: 250
KETONES UR STRIP-MCNC: ABNORMAL MG/DL
LEUKOCYTE ESTERASE UR QL STRIP: 25
LIPASE SERPL-CCNC: 75 U/L (ref 23–300)
LYMPHOCYTES # BLD AUTO: 13 % (ref 25–45)
LYMPHOCYTES # BLD AUTO: 2.61 THOUSAND/UL (ref 0.5–4)
MCH RBC QN AUTO: 26.8 PG (ref 26–34)
MCHC RBC AUTO-ENTMCNC: 32.6 G/DL (ref 31–36)
MCV RBC AUTO: 82 FL (ref 80–100)
MONOCYTES # BLD AUTO: 0.4 THOUSAND/UL (ref 0.2–0.9)
MONOCYTES NFR BLD AUTO: 2 % (ref 1–10)
MUCOUS THREADS UR QL AUTO: ABNORMAL
NEUTS SEG # BLD: 17.09 THOUSAND/UL (ref 1.8–7.8)
NEUTS SEG NFR BLD AUTO: 85 %
NITRITE UR QL STRIP: NEGATIVE
NON-SQ EPI CELLS URNS QL MICRO: ABNORMAL /HPF
PH UR STRIP.AUTO: 6 [PH]
PLATELET # BLD AUTO: 258 THOUSANDS/UL (ref 150–450)
PLATELET BLD QL SMEAR: ADEQUATE
PMV BLD AUTO: 8.3 FL (ref 8.9–12.7)
POTASSIUM SERPL-SCNC: 4.2 MMOL/L (ref 3.6–5)
PROT SERPL-MCNC: 7.4 G/DL (ref 5.9–8.4)
PROT UR STRIP-MCNC: ABNORMAL MG/DL
RBC # BLD AUTO: 5.88 MILLION/UL (ref 4.5–5.9)
RBC #/AREA URNS AUTO: ABNORMAL /HPF
RBC MORPH BLD: NORMAL
SODIUM SERPL-SCNC: 134 MMOL/L (ref 137–147)
SP GR UR STRIP.AUTO: 1.02 (ref 1–1.04)
TOTAL CELLS COUNTED SPEC: 100
TROPONIN I SERPL-MCNC: <0.01 NG/ML (ref 0–0.03)
UROBILINOGEN UA: NEGATIVE MG/DL
WBC # BLD AUTO: 20.1 THOUSAND/UL (ref 4.5–11)
WBC #/AREA URNS AUTO: ABNORMAL /HPF

## 2019-08-26 PROCEDURE — 81003 URINALYSIS AUTO W/O SCOPE: CPT | Performed by: PHYSICIAN ASSISTANT

## 2019-08-26 PROCEDURE — 99284 EMERGENCY DEPT VISIT MOD MDM: CPT | Performed by: PHYSICIAN ASSISTANT

## 2019-08-26 PROCEDURE — 85027 COMPLETE CBC AUTOMATED: CPT | Performed by: PHYSICIAN ASSISTANT

## 2019-08-26 PROCEDURE — 99284 EMERGENCY DEPT VISIT MOD MDM: CPT

## 2019-08-26 PROCEDURE — 74177 CT ABD & PELVIS W/CONTRAST: CPT

## 2019-08-26 PROCEDURE — 96375 TX/PRO/DX INJ NEW DRUG ADDON: CPT

## 2019-08-26 PROCEDURE — 83690 ASSAY OF LIPASE: CPT | Performed by: PHYSICIAN ASSISTANT

## 2019-08-26 PROCEDURE — 96361 HYDRATE IV INFUSION ADD-ON: CPT

## 2019-08-26 PROCEDURE — 80053 COMPREHEN METABOLIC PANEL: CPT | Performed by: PHYSICIAN ASSISTANT

## 2019-08-26 PROCEDURE — 85007 BL SMEAR W/DIFF WBC COUNT: CPT | Performed by: PHYSICIAN ASSISTANT

## 2019-08-26 PROCEDURE — 81001 URINALYSIS AUTO W/SCOPE: CPT | Performed by: PHYSICIAN ASSISTANT

## 2019-08-26 PROCEDURE — 96374 THER/PROPH/DIAG INJ IV PUSH: CPT

## 2019-08-26 PROCEDURE — 36415 COLL VENOUS BLD VENIPUNCTURE: CPT | Performed by: PHYSICIAN ASSISTANT

## 2019-08-26 PROCEDURE — 84484 ASSAY OF TROPONIN QUANT: CPT | Performed by: PHYSICIAN ASSISTANT

## 2019-08-26 PROCEDURE — 93005 ELECTROCARDIOGRAM TRACING: CPT

## 2019-08-26 RX ORDER — SUCRALFATE ORAL 1 G/10ML
1000 SUSPENSION ORAL ONCE
Status: COMPLETED | OUTPATIENT
Start: 2019-08-26 | End: 2019-08-26

## 2019-08-26 RX ORDER — MAGNESIUM HYDROXIDE/ALUMINUM HYDROXICE/SIMETHICONE 120; 1200; 1200 MG/30ML; MG/30ML; MG/30ML
30 SUSPENSION ORAL ONCE
Status: COMPLETED | OUTPATIENT
Start: 2019-08-26 | End: 2019-08-26

## 2019-08-26 RX ORDER — ONDANSETRON 4 MG/1
4 TABLET, FILM COATED ORAL EVERY 12 HOURS PRN
Qty: 12 TABLET | Refills: 0 | Status: SHIPPED | OUTPATIENT
Start: 2019-08-26 | End: 2020-04-03

## 2019-08-26 RX ORDER — ONDANSETRON 2 MG/ML
4 INJECTION INTRAMUSCULAR; INTRAVENOUS ONCE
Status: COMPLETED | OUTPATIENT
Start: 2019-08-26 | End: 2019-08-26

## 2019-08-26 RX ORDER — SUCRALFATE 1 G/1
1 TABLET ORAL 4 TIMES DAILY
Qty: 40 TABLET | Refills: 0 | Status: SHIPPED | OUTPATIENT
Start: 2019-08-26 | End: 2020-04-03

## 2019-08-26 RX ORDER — FAMOTIDINE 20 MG/1
20 TABLET, FILM COATED ORAL 2 TIMES DAILY
Qty: 30 TABLET | Refills: 0 | Status: SHIPPED | OUTPATIENT
Start: 2019-08-26 | End: 2020-04-03

## 2019-08-26 RX ORDER — AMOXICILLIN AND CLAVULANATE POTASSIUM 875; 125 MG/1; MG/1
1 TABLET, FILM COATED ORAL EVERY 12 HOURS
Qty: 20 TABLET | Refills: 0 | Status: SHIPPED | OUTPATIENT
Start: 2019-08-26 | End: 2019-09-05

## 2019-08-26 RX ORDER — SODIUM CHLORIDE 9 MG/ML
250 INJECTION, SOLUTION INTRAVENOUS CONTINUOUS
Status: DISCONTINUED | OUTPATIENT
Start: 2019-08-26 | End: 2019-08-26 | Stop reason: HOSPADM

## 2019-08-26 RX ADMIN — ONDANSETRON 4 MG: 2 INJECTION, SOLUTION INTRAMUSCULAR; INTRAVENOUS at 14:42

## 2019-08-26 RX ADMIN — IOHEXOL 100 ML: 350 INJECTION, SOLUTION INTRAVENOUS at 17:13

## 2019-08-26 RX ADMIN — SODIUM CHLORIDE 250 ML/HR: 0.9 INJECTION, SOLUTION INTRAVENOUS at 14:42

## 2019-08-26 RX ADMIN — ALUMINUM HYDROXIDE, MAGNESIUM HYDROXIDE, AND SIMETHICONE 30 ML: 200; 200; 20 SUSPENSION ORAL at 14:40

## 2019-08-26 RX ADMIN — FAMOTIDINE 20 MG: 10 INJECTION, SOLUTION INTRAVENOUS at 14:42

## 2019-08-26 RX ADMIN — SUCRALFATE 1000 MG: 1 SUSPENSION ORAL at 14:40

## 2019-08-26 NOTE — ED PROVIDER NOTES
History  Chief Complaint   Patient presents with    Abdominal Pain     Since last night   Vomiting     since last night into this am        History provided by:  Patient   used: No    Medical Problem   Location:  Pt with nausea vomiting and abdomen pain since last night after eating dinner   Severity:  Mild  Onset quality:  Gradual  Duration:  1 day  Timing:  Constant  Progression:  Unchanged  Chronicity:  New  Associated symptoms: abdominal pain, nausea and vomiting    Associated symptoms: no chest pain, no congestion, no cough, no diarrhea, no ear pain, no fatigue, no fever, no headaches, no loss of consciousness, no myalgias, no rash, no rhinorrhea, no shortness of breath, no sore throat and no wheezing        Prior to Admission Medications   Prescriptions Last Dose Informant Patient Reported? Taking?   atorvastatin (LIPITOR) 20 mg tablet   No No   Sig: Take 1 tablet (20 mg total) by mouth daily   hydrochlorothiazide (HYDRODIURIL) 25 mg tablet   No No   Sig: Take 1 tablet (25 mg total) by mouth daily   lisinopril (ZESTRIL) 40 mg tablet   No No   Sig: Take 1 tablet (40 mg total) by mouth daily      Facility-Administered Medications: None       Past Medical History:   Diagnosis Date    Hypertension     Prediabetes        History reviewed  No pertinent surgical history  Family History   Problem Relation Age of Onset    Hypertension Mother     Coronary artery disease Mother     Hypertension Father     Coronary artery disease Father     Hypertension Sister     Coronary artery disease Sister     Hypertension Brother     Coronary artery disease Brother     Hypertension Paternal Nichole Lacey     Stroke Paternal Uncle      I have reviewed and agree with the history as documented      Social History     Tobacco Use    Smoking status: Former Smoker    Smokeless tobacco: Former User   Substance Use Topics    Alcohol use: Not on file    Drug use: Never        Review of Systems Constitutional: Negative  Negative for fatigue and fever  HENT: Negative  Negative for congestion, ear pain, rhinorrhea and sore throat  Eyes: Negative  Respiratory: Negative  Negative for cough, shortness of breath and wheezing  Cardiovascular: Negative  Negative for chest pain  Gastrointestinal: Positive for abdominal pain, nausea and vomiting  Negative for diarrhea  Endocrine: Negative  Genitourinary: Negative  Musculoskeletal: Negative  Negative for myalgias  Skin: Negative  Negative for rash  Allergic/Immunologic: Negative  Neurological: Negative  Negative for loss of consciousness and headaches  Hematological: Negative  Psychiatric/Behavioral: Negative  All other systems reviewed and are negative  Physical Exam  Physical Exam   Constitutional: He is oriented to person, place, and time  He appears well-developed and well-nourished  615pm   Pt is pain free    HENT:   Head: Normocephalic and atraumatic  Right Ear: External ear normal    Left Ear: External ear normal    Nose: Nose normal    Mouth/Throat: Oropharynx is clear and moist    Eyes: Pupils are equal, round, and reactive to light  Conjunctivae and EOM are normal    Neck: Normal range of motion  Neck supple  Cardiovascular: Normal rate, regular rhythm, normal heart sounds and intact distal pulses  Pulmonary/Chest: Effort normal and breath sounds normal    Abdominal: Soft  Bowel sounds are normal  There is tenderness in the epigastric area, left upper quadrant and left lower quadrant  There is no rigidity, no rebound, no guarding, no CVA tenderness and negative Payan's sign  midepigastric tender  No ruq or luq or rlq or llq pain    Musculoskeletal: Normal range of motion  Neurological: He is alert and oriented to person, place, and time  Skin: Skin is warm  Nursing note and vitals reviewed        Vital Signs  ED Triage Vitals [08/26/19 1418]   Temperature Pulse Respirations Blood Pressure SpO2   97 7 °F (36 5 °C) 87 18 167/84 98 %      Temp Source Heart Rate Source Patient Position - Orthostatic VS BP Location FiO2 (%)   Tympanic Monitor Lying Left arm --      Pain Score       --           Vitals:    08/26/19 1418   BP: 167/84   Pulse: 87   Patient Position - Orthostatic VS: Lying         Visual Acuity      ED Medications  Medications   sodium chloride 0 9 % infusion (0 mL/hr Intravenous Stopped 8/26/19 1857)   ondansetron (ZOFRAN) injection 4 mg (4 mg Intravenous Given 8/26/19 1442)   famotidine (PEPCID) injection 20 mg (20 mg Intravenous Given 8/26/19 1442)   aluminum-magnesium hydroxide-simethicone (MYLANTA) 200-200-20 mg/5 mL oral suspension 30 mL (30 mL Oral Given 8/26/19 1440)   sucralfate (CARAFATE) oral suspension 1,000 mg (1,000 mg Oral Given 8/26/19 1440)   iohexol (OMNIPAQUE) 350 MG/ML injection (MULTI-DOSE) 100 mL (100 mL Intravenous Given 8/26/19 1713)       Diagnostic Studies  Results Reviewed     Procedure Component Value Units Date/Time    Urine Microscopic [[id number]]  (Abnormal) Collected:  08/26/19 1510    Lab Status:  Final result Specimen:  Urine, Clean Catch Updated:  08/26/19 1611     RBC, UA Innumerable /hpf      WBC, UA 2-4 /hpf      Epithelial Cells Occasional /hpf      Bacteria, UA Moderate /hpf      AMORPH URATES Occasional /hpf      MUCUS THREADS Innumerable    UA w Reflex to Microscopic w Reflex to Culture [[id number]]  (Abnormal) Collected:  08/26/19 1510    Lab Status:  Final result Specimen:  Urine, Clean Catch Updated:  08/26/19 1608     Color, UA Tara     Clarity, UA Clear     Specific [place], UA 1 020     pH, UA 6 0     Leukocytes, UA 25 0     Nitrite, UA Negative     Protein,  (2+) mg/dl      Glucose, UA Negative mg/dl      Ketones, UA 5 (Trace) mg/dl      Bilirubin, UA Negative     Blood,  0     UROBILINOGEN UA Negative mg/dL     Troponin I [[id number]]  (Normal) Collected:  08/26/19 1510    Lab Status:  Final result Specimen:  Blood from Arm, Right Updated:  08/26/19 1552     Troponin I <0 01 ng/mL     Narrative:       Hemolysis    Lipase [[id number]]  (Normal) Collected:  08/26/19 1510    Lab Status:  Final result Specimen:  Blood from Arm, Right Updated:  08/26/19 1544     Lipase 75 u/L     Comprehensive metabolic panel [[id number]]  (Abnormal) Collected:  08/26/19 1510    Lab Status:  Final result Specimen:  Blood from Arm, Right Updated:  08/26/19 1543     Sodium 134 mmol/L      Potassium 4 2 mmol/L      Chloride 99 mmol/L      CO2 26 mmol/L      ANION GAP 9 mmol/L      BUN 19 mg/dL      Creatinine 0 67 mg/dL      Glucose 163 mg/dL      Calcium 9 3 mg/dL      AST 35 U/L      ALT 17 U/L      Alkaline Phosphatase 53 U/L      Total Protein 7 4 g/dL      Albumin 4 1 g/dL      Total Bilirubin 0 50 mg/dL      eGFR 102 ml/min/1 73sq m     Narrative:       [place] guidelines for Chronic Kidney Disease (CKD):     Stage 1 with normal or high GFR (GFR > 90 mL/min/1 73 square meters)    Stage 2 Mild CKD (GFR = 60-89 mL/min/1 73 square meters)    Stage 3A Moderate CKD (GFR = 45-59 mL/min/1 73 square meters)    Stage 3B Moderate CKD (GFR = 30-44 mL/min/1 73 square meters)    Stage 4 Severe CKD (GFR = 15-29 mL/min/1 73 square meters)    Stage 5 End Stage CKD (GFR <15 mL/min/1 73 square meters)  Note: GFR calculation is accurate only with a steady state creatinine    CBC and differential [[id number]]  (Abnormal) Collected:  08/26/19 1435    Lab Status:  Final result Specimen:  Blood from Arm, Right Updated:  08/26/19 1452     WBC 20 10 Thousand/uL      RBC 5 88 Million/uL      Hemoglobin 15 7 g/dL      Hematocrit 48 2 %      MCV 82 fL      MCH 26 8 pg      MCHC 32 6 g/dL      RDW 15 6 %      MPV 8 3 fL      Platelets 295 Thousands/uL                  CT abdomen pelvis with contrast   Final Result by Kaitlin Winkler MD (08/26 1726)      A few loops of mildly thick-walled small bowel within the left mid abdomen with mild adjacent mesenteric stranding suspicious for an infectious or inflammatory enteritis  Small amount of pelvic free fluid  No free intraperitoneal air  No evidence of large or small bowel obstruction  Cholelithiasis with no pericholecystic cystic inflammatory change  Scattered colonic diverticulosis with no evidence of acute diverticulitis  14 mm indeterminant left adrenal nodule likely representing an adrenal adenoma  However, no prior studies are available for comparison  If there is clinical concern, a dedicated CT or MRI of the adrenal glands could be performed for further    characterization  Workstation performed: [id number]                    Procedures  Procedures       ED Course                               MDM    Disposition  Final diagnoses:   Abdominal pain   Gastroenteritis   Adrenal nodule ([facility] [address] )   Hypertension   Hyperglycemia     Time reflects when diagnosis was documented in both MDM as applicable and the Disposition within this note     Time User Action Codes Description Comment    8/26/2019  6:15 PM Ingrid Ramirez  Add [R10 9] Abdominal pain     8/26/2019  6:15 PM Ingrid Ramirez  Add [K52 9] Gastroenteritis     8/26/2019  6:15 PM Chestine Purple Jadon Cyphers  Add [E27 9] Adrenal nodule ([facility] [address] )     8/26/2019  6:20 PM Chestine Purple Jadon Cyphers  Add [I10] Hypertension     8/26/2019  6:20 PM Ingrid Ramirez  Add [R73 9] Hyperglycemia       ED Disposition     ED Disposition Condition Date/Time Comment    Discharge Stable Mon Aug 26, 2019  6:15 PM Shaw Giron discharge to home/self care              Follow-up Information     Follow up With Specialties Details Why Lenny Askew MD Family Medicine  recheck with family doctor for further tests of adrenal nodule [address]  [phone]            Discharge Medication List as of 8/26/2019  6:20 PM      START taking these medications    Details   amoxicillin-clavulanate (AUGMENTIN) 875-125 mg per tablet Take 1 tablet by mouth every 12 (twelve) hours for 10 days, Starting Mon 8/26/2019, Until Thu 9/5/2019, Print      famotidine (PEPCID) 20 mg tablet Take 1 tablet (20 mg total) by mouth 2 (two) times a day, Starting Mon 8/26/2019, Print      ondansetron (ZOFRAN) 4 mg tablet Take 1 tablet (4 mg total) by mouth every 12 (twelve) hours as needed for nausea, Starting Mon 8/26/2019, Print      sucralfate (CARAFATE) 1 g tablet Take 1 tablet (1 g total) by mouth 4 (four) times a day, Starting Mon 8/26/2019, Print         CONTINUE these medications which have NOT CHANGED    Details   atorvastatin (LIPITOR) 20 mg tablet Take 1 tablet (20 mg total) by mouth daily, Starting Wed 7/10/2019, Normal      hydrochlorothiazide (HYDRODIURIL) 25 mg tablet Take 1 tablet (25 mg total) by mouth daily, Starting Wed 7/10/2019, Normal      lisinopril (ZESTRIL) 40 mg tablet Take 1 tablet (40 mg total) by mouth daily, Starting Mon 6/3/2019, Normal           No discharge procedures on file      ED Provider  Electronically Signed by           Jose Guzmán PA-C  08/26/19 2036

## 2019-08-28 LAB
ATRIAL RATE: 68 BPM
P AXIS: 55 DEGREES
PR INTERVAL: 138 MS
QRS AXIS: 18 DEGREES
QRSD INTERVAL: 100 MS
QT INTERVAL: 424 MS
QTC INTERVAL: 450 MS
T WAVE AXIS: 1 DEGREES
VENTRICULAR RATE: 68 BPM

## 2019-08-28 PROCEDURE — 93010 ELECTROCARDIOGRAM REPORT: CPT | Performed by: INTERNAL MEDICINE

## 2019-10-15 ENCOUNTER — TELEPHONE (OUTPATIENT)
Dept: FAMILY MEDICINE CLINIC | Facility: CLINIC | Age: 64
End: 2019-10-15

## 2019-10-15 ENCOUNTER — OFFICE VISIT (OUTPATIENT)
Dept: FAMILY MEDICINE CLINIC | Facility: CLINIC | Age: 64
End: 2019-10-15

## 2019-10-15 VITALS
SYSTOLIC BLOOD PRESSURE: 190 MMHG | BODY MASS INDEX: 43.84 KG/M2 | WEIGHT: 296 LBS | HEART RATE: 84 BPM | DIASTOLIC BLOOD PRESSURE: 86 MMHG | RESPIRATION RATE: 18 BRPM | TEMPERATURE: 96.3 F | HEIGHT: 69 IN | OXYGEN SATURATION: 96 %

## 2019-10-15 DIAGNOSIS — I10 ESSENTIAL HYPERTENSION: ICD-10-CM

## 2019-10-15 DIAGNOSIS — L30.9 ECZEMA, UNSPECIFIED TYPE: ICD-10-CM

## 2019-10-15 DIAGNOSIS — Z12.11 SCREENING FOR COLON CANCER: Primary | ICD-10-CM

## 2019-10-15 DIAGNOSIS — R19.7 DIARRHEA, UNSPECIFIED TYPE: ICD-10-CM

## 2019-10-15 LAB
CREAT UR-MCNC: 187 MG/DL
MICROALBUMIN UR-MCNC: 39.7 MG/L (ref 0–20)
MICROALBUMIN/CREAT 24H UR: 21 MG/G CREATININE (ref 0–30)

## 2019-10-15 PROCEDURE — 3008F BODY MASS INDEX DOCD: CPT | Performed by: FAMILY MEDICINE

## 2019-10-15 PROCEDURE — 82043 UR ALBUMIN QUANTITATIVE: CPT | Performed by: FAMILY MEDICINE

## 2019-10-15 PROCEDURE — 99213 OFFICE O/P EST LOW 20 MIN: CPT | Performed by: FAMILY MEDICINE

## 2019-10-15 PROCEDURE — 82570 ASSAY OF URINE CREATININE: CPT | Performed by: FAMILY MEDICINE

## 2019-10-15 RX ORDER — SACCHAROMYCES BOULARDII 250 MG
250 CAPSULE ORAL 2 TIMES DAILY
Qty: 60 CAPSULE | Refills: 2 | Status: SHIPPED | OUTPATIENT
Start: 2019-10-15 | End: 2020-03-11 | Stop reason: SDUPTHER

## 2019-10-15 RX ORDER — CHLORTHALIDONE 25 MG/1
25 TABLET ORAL DAILY
Qty: 30 TABLET | Refills: 0 | Status: SHIPPED | OUTPATIENT
Start: 2019-10-15 | End: 2019-11-20 | Stop reason: SDUPTHER

## 2019-10-15 NOTE — PATIENT INSTRUCTIONS
Lifestyle Medicine Tip Sheet- [place]    1  Coma alimentos predominantemente menos procesados, giana comida rápida, cenas de televisión y tocino  2  Coma cerca de la naturaleza (mercados de agricultores, productos frescos o congelados)    3  Coma dion dieta predominantemente basada en plantas  a  Verdes frondosos oscuros katherine   b  Frutas y vegetales  c   Granos integrales: maritza integral, apenas, bayas de maritza, quinua, jimmy cortada en cha, arroz integral, pasta integral   d  Legumbres: frijoles, frijoles pintos, frijoles blancos, frijoles negros, garbanzos (garbanzos), frijoles lima (maduros, secos), arvejas, lentejas y edamame (frijoles de soya verdes)      4  Al menos la mitad del plato debe contener frutas o verduras  5  El líquido debe ser predominantemente agua (limite el refresco y [place])    6  Yareli el tamaño de la porción  7  ¿Qué alimentos cornelius evitar o limitar? - Grasas: Específicamente saturadas y grasas trans  Se encuentran en margarinas, muchas comidas rápidas y algunos productos horneados comprados en la kendra  Las grasas saturadas y grasas trans pueden elevar lares nivel de colesterol y lares probabilidad de contraer enfermedades cardíacas  - Cuando cocine, es mejor no usar aceites, kelin si es necesario, trate de limitar la cantidad de aceite utilizado, ya que el aceite contiene muchas calorías por volumen y es muy poco saludable cuando se calienta mariella la cocción   - Azúcar: limite o evite el azúcar, los dulces y los granos refinados  Los granos refinados se encuentran en el pan reinoso, el arroz reinoso, la mayoría de las formas de pasta y la mayoría de los alimentos "aperitivos" envasados  - Intente no cocinar con ranjeet y evite agregar ranjeet adicional a clover comidas  - Thermon Jabs: los estudios hyatt demostrado que comer mucha minnie Jenkins riesgo de ciertos problemas de [place], giana enfermedades cardíacas y cáncer  8  7-9 horas de sueño    9   Ejercicio diario mínimo de 30 minutos (caminando alrededor de la tylor)    10  Socialización (amigos y familiares)    - Explora tu vecindario  Ve al parque, pasa tiempo en la biblioteca  Si está interesado, puede leer más sobre las opciones de alimentos saludables en los siguientes sitios web:  a  [employer]  org  b  Home cooking recipes: https://www OrderMyGear/  c  http://tanvir info/  d  Familydoctor  org

## 2019-10-15 NOTE — TELEPHONE ENCOUNTER
----- Message from Shon Villagran MD sent at 10/15/2019  4:12 PM EDT -----  Hello,    Can we schedule this pt for a nurse visit in 2 weeks to recheck his blood pressure? I forgot to do this during his visit       Thank you,    Albino Jade

## 2019-10-16 NOTE — PROGRESS NOTES
Assessment/Plan:    Essential hypertension  Currently not at goal per [facility] 8 guidelines  Pt presented to office in July and had high SBP however stated that readings at home were lower  I have asked the pt to bring in his machine to compare our readings v s  His cuff  Pt denies sx of chest pain, SOB, headaches or visual disturbance  I believe pt would benefit from Chlorthalidone v s  HCTZ as it has greater blood pressure lowering effects  Last K+ completed in 8/2019 was within normal limits  Initiate Chlorthalidone 25 mg and cont Lisinopril 40 mg QD  Recheck bp in 1-2 weeks w/ nurse visit  Eczema  Erythematous, blanching flat lesion on left breast which is pruritic  Significant for the past month which may improve w/ short usage of tpical steroid  Hydrocortisone 2 5% BID PRN for 1 week  F/u if no improvement  Diarrhea  Pt reporting LLQ abdominal pain w/ associated diarrhea for the past 1 month  He is s/p Augmentin for gastroenteritis which could potentially lead to C difficile associated diarrhea  Have sent pt for stool PCR for r/o and advised use of Probiotic BID  F/u on Stool PCR and treat if needed  Encounter for screening colonoscopy  Pt states he completed a colonoscopy in 2018 in [place]  Reports that results were normal and repeat will be in 10 years  I have requested his previous records  Diagnoses and all orders for this visit:    Screening for colon cancer    Essential hypertension  -     Microalbumin / creatinine urine ratio  -     chlorthalidone 25 mg tablet; Take 1 tablet (25 mg total) by mouth daily  -     aspirin 81 MG tablet; Take 1 tablet (81 mg total) by mouth daily    Diarrhea, unspecified type  -     Clostridium difficile toxin by PCR; Future  -     saccharomyces boulardii (FLORASTOR) 250 mg capsule; Take 1 capsule (250 mg total) by mouth 2 (two) times a day    Eczema, unspecified type  -     hydrocortisone 2 5 % ointment;  Apply topically 2 (two) times a day for 7 days    Other orders  -     Cancel: Ambulatory referral to Gastroenterology; Future  -     Cancel: POCT ECG          Subjective:      Patient ID: Manda Potts is a 62 y o  male  Manda Potts is a 62 y o  Male, presents for f/u of HTN  At last visit, pt's bp was found to not be at goal and was advised to continue his home meds and come back for f/u  Today he presents w/ home readings this morning of SBP approx 140 this morning and afternoon reading of 160  He is denying chest pain, headaches SOB or visual disturbances  Today he expresses concern regarding left sided abdominal pain which he describes as sharp and intermittent, unrelated to eating  States it has been significant for 1 month  He notes that he was prescribed Augmentin for gastroenteritis and noticed that he started to have diarrhea and this stomach pain  States that currently he has 2 loose movements per day  The following portions of the patient's history were reviewed and updated as appropriate: He  has a past medical history of Hypertension and Prediabetes  Patient Active Problem List    Diagnosis Date Noted    Eczema 10/15/2019    Diarrhea 10/15/2019    Prediabetes 03/14/2019    Encounter for screening colonoscopy 03/14/2019    Screening for viral disease 03/14/2019    Class 3 severe obesity due to excess calories with body mass index (BMI) of 40 0 to 44 9 in adult [facility]) 02/28/2019    Hematospermia 07/19/2018    Essential hypertension 07/19/2018     He  has no past surgical history on file  His family history includes Coronary artery disease in his brother, father, mother, and sister; Hypertension in his brother, father, mother, paternal uncle, and sister; Stroke in his paternal uncle  He  reports that he has quit smoking  He has quit using smokeless tobacco  He reports that he does not use drugs  His alcohol history is not on file    Current Outpatient Medications   Medication Sig Dispense Refill    atorvastatin (LIPITOR) 20 mg tablet Take 1 tablet (20 mg total) by mouth daily 30 tablet 3    famotidine (PEPCID) 20 mg tablet Take 1 tablet (20 mg total) by mouth 2 (two) times a day 30 tablet 0    lisinopril (ZESTRIL) 40 mg tablet Take 1 tablet (40 mg total) by mouth daily 30 tablet 2    aspirin 81 MG tablet Take 1 tablet (81 mg total) by mouth daily      chlorthalidone 25 mg tablet Take 1 tablet (25 mg total) by mouth daily 30 tablet 0    hydrocortisone 2 5 % ointment Apply topically 2 (two) times a day for 7 days 20 g 0    ondansetron (ZOFRAN) 4 mg tablet Take 1 tablet (4 mg total) by mouth every 12 (twelve) hours as needed for nausea 12 tablet 0    saccharomyces boulardii (FLORASTOR) 250 mg capsule Take 1 capsule (250 mg total) by mouth 2 (two) times a day 60 capsule 2    sucralfate (CARAFATE) 1 g tablet Take 1 tablet (1 g total) by mouth 4 (four) times a day 40 tablet 0     No current facility-administered medications for this visit  Current Outpatient Medications on File Prior to Visit   Medication Sig    atorvastatin (LIPITOR) 20 mg tablet Take 1 tablet (20 mg total) by mouth daily    famotidine (PEPCID) 20 mg tablet Take 1 tablet (20 mg total) by mouth 2 (two) times a day    lisinopril (ZESTRIL) 40 mg tablet Take 1 tablet (40 mg total) by mouth daily    [DISCONTINUED] hydrochlorothiazide (HYDRODIURIL) 25 mg tablet Take 1 tablet (25 mg total) by mouth daily    ondansetron (ZOFRAN) 4 mg tablet Take 1 tablet (4 mg total) by mouth every 12 (twelve) hours as needed for nausea    sucralfate (CARAFATE) 1 g tablet Take 1 tablet (1 g total) by mouth 4 (four) times a day     No current facility-administered medications on file prior to visit  He is allergic to amlodipine       Review of Systems   Constitutional: Negative for appetite change, diaphoresis and fever  Eyes: Negative for visual disturbance  Respiratory: Negative for cough, shortness of breath and wheezing      Cardiovascular: Negative for chest pain and palpitations  Gastrointestinal: Positive for abdominal pain and diarrhea  Negative for constipation  Left sided abdominal pain   Neurological: Negative for dizziness and headaches  Objective:      BP (!) 190/86 (BP Location: Left arm, Patient Position: Sitting, Cuff Size: Large)   Pulse 84   Temp (!) 96 3 °F (35 7 °C) (Temporal)   Resp 18   Ht 5' 9" (1 753 m)   Wt 134 kg (296 lb)   SpO2 96%   BMI 43 71 kg/m²          Physical Exam   Constitutional: He appears well-developed and well-nourished  No distress  HENT:   Head: Normocephalic and atraumatic  Nose: Nose normal    Mouth/Throat: Oropharynx is clear and moist    Eyes: Conjunctivae and EOM are normal    Cardiovascular: Normal rate, regular rhythm and normal heart sounds  Pulmonary/Chest: Effort normal and breath sounds normal  No respiratory distress  He has no wheezes  Abdominal: Soft  Bowel sounds are normal  There is tenderness  Mild tenderness noted on deep palpation of LLQ   Musculoskeletal: He exhibits no edema or tenderness  Neurological: He is alert  Skin: Skin is warm and dry  He is not diaphoretic  Macular lesion that is erythematous & blanching  Located on left chest   Approx 1 5 cm in diameter  Vitals reviewed

## 2019-10-23 DIAGNOSIS — I10 ESSENTIAL HYPERTENSION: ICD-10-CM

## 2019-10-29 RX ORDER — ATORVASTATIN CALCIUM 20 MG/1
20 TABLET, FILM COATED ORAL DAILY
Qty: 30 TABLET | Refills: 3 | Status: SHIPPED | OUTPATIENT
Start: 2019-10-29 | End: 2020-02-18 | Stop reason: SDUPTHER

## 2019-11-20 DIAGNOSIS — I10 ESSENTIAL HYPERTENSION: ICD-10-CM

## 2019-11-22 RX ORDER — CHLORTHALIDONE 25 MG/1
25 TABLET ORAL DAILY
Qty: 30 TABLET | Refills: 0 | Status: SHIPPED | OUTPATIENT
Start: 2019-11-22 | End: 2019-12-24 | Stop reason: SDUPTHER

## 2019-12-11 DIAGNOSIS — I10 ESSENTIAL HYPERTENSION: ICD-10-CM

## 2019-12-11 RX ORDER — LISINOPRIL 40 MG/1
40 TABLET ORAL DAILY
Qty: 30 TABLET | Refills: 2 | Status: SHIPPED | OUTPATIENT
Start: 2019-12-11 | End: 2020-03-03 | Stop reason: SDUPTHER

## 2019-12-11 NOTE — TELEPHONE ENCOUNTER
Pt last seen 10/15/19 and bp was not at goal   Pt was added clorthalidone to regimen and was to continue lisinopril and have bp checked with RN in two weeks but did not show  Pt was to f/u in 4 weeks with provider and did not show on 11/20/19 and has not rescheduled missed appt  SW clerical, can you please contact pt to attempt and reschedule missed appt on 11/20/19 with Dr Dusty Castellanos? Thanks so much

## 2019-12-24 DIAGNOSIS — I10 ESSENTIAL HYPERTENSION: ICD-10-CM

## 2019-12-26 RX ORDER — CHLORTHALIDONE 25 MG/1
25 TABLET ORAL DAILY
Qty: 30 TABLET | Refills: 5 | Status: SHIPPED | OUTPATIENT
Start: 2019-12-26 | End: 2020-03-11 | Stop reason: SDUPTHER

## 2020-01-16 DIAGNOSIS — Z78.9 NEED FOR FOLLOW-UP BY SOCIAL WORKER: Primary | ICD-10-CM

## 2020-01-21 ENCOUNTER — PATIENT OUTREACH (OUTPATIENT)
Dept: FAMILY MEDICINE CLINIC | Facility: CLINIC | Age: 65
End: 2020-01-21

## 2020-02-04 ENCOUNTER — PATIENT OUTREACH (OUTPATIENT)
Dept: FAMILY MEDICINE CLINIC | Facility: CLINIC | Age: 65
End: 2020-02-04

## 2020-02-04 NOTE — PROGRESS NOTES
KHALIF SALVADOR received a referral from clerical staff regarding patient's 2 consecutive missed appointments  KHALIF SALVADOR contacted patient to assess for social barriers and discuss risk for discharge  KHALIF SALVADOR communicated with patient in his native language, [address]  Patient explained his barrier to getting to his appointments is his job  Patient states he works Monday through Saturday  His hours start before 6:00 AM and go until midnight  Patient explained it is very difficult for him to get a day off of work as well  Patient denies having transportation issues and states he lives close enough to walk to [address] [place] [address] [place] explained to patient that he should call to cancel within 24 hours or call to reschedule appointments if he is unable to attend  KHALIF SALVADOR explained the discharge policy and informed him he will risk discharge if he does not attend his next scheduled appointment  Patient agreed to contact [facility] upon obtaining a day off from work  Phone number provided  Patient expressed gratitude for the phone call  Referral will be closed-KHALIF SALVADOR will remain available as needed

## 2020-02-18 DIAGNOSIS — I10 ESSENTIAL HYPERTENSION: ICD-10-CM

## 2020-02-18 RX ORDER — ATORVASTATIN CALCIUM 20 MG/1
20 TABLET, FILM COATED ORAL DAILY
Qty: 30 TABLET | Refills: 0 | Status: SHIPPED | OUTPATIENT
Start: 2020-02-18 | End: 2020-03-11 | Stop reason: SINTOL

## 2020-02-18 NOTE — TELEPHONE ENCOUNTER
Last OV 10/15/19  Pt was to f/u in 4 weeks but has either cancelled or no showed to f/u appts  SW clerical team, can you please contact pt and attempt to reschedule missed appt for f/u of HTN? Thanks so much

## 2020-03-03 DIAGNOSIS — I10 ESSENTIAL HYPERTENSION: ICD-10-CM

## 2020-03-03 RX ORDER — LISINOPRIL 40 MG/1
40 TABLET ORAL DAILY
Qty: 30 TABLET | Refills: 2 | Status: SHIPPED | OUTPATIENT
Start: 2020-03-03 | End: 2020-03-11 | Stop reason: SDUPTHER

## 2020-03-11 ENCOUNTER — OFFICE VISIT (OUTPATIENT)
Dept: FAMILY MEDICINE CLINIC | Facility: CLINIC | Age: 65
End: 2020-03-11

## 2020-03-11 VITALS
BODY MASS INDEX: 43.48 KG/M2 | TEMPERATURE: 98 F | OXYGEN SATURATION: 98 % | SYSTOLIC BLOOD PRESSURE: 184 MMHG | HEIGHT: 69 IN | HEART RATE: 81 BPM | RESPIRATION RATE: 16 BRPM | WEIGHT: 293.6 LBS | DIASTOLIC BLOOD PRESSURE: 70 MMHG

## 2020-03-11 DIAGNOSIS — R19.7 DIARRHEA, UNSPECIFIED TYPE: ICD-10-CM

## 2020-03-11 DIAGNOSIS — Z12.11 COLON CANCER SCREENING: ICD-10-CM

## 2020-03-11 DIAGNOSIS — R73.03 PREDIABETES: ICD-10-CM

## 2020-03-11 DIAGNOSIS — Z23 NEED FOR VACCINATION: Primary | ICD-10-CM

## 2020-03-11 DIAGNOSIS — I10 ESSENTIAL HYPERTENSION: ICD-10-CM

## 2020-03-11 DIAGNOSIS — E66.01 MORBID OBESITY WITH BMI OF 40.0-44.9, ADULT (HCC): ICD-10-CM

## 2020-03-11 PROCEDURE — 3078F DIAST BP <80 MM HG: CPT | Performed by: FAMILY MEDICINE

## 2020-03-11 PROCEDURE — 99213 OFFICE O/P EST LOW 20 MIN: CPT | Performed by: FAMILY MEDICINE

## 2020-03-11 PROCEDURE — 1036F TOBACCO NON-USER: CPT | Performed by: FAMILY MEDICINE

## 2020-03-11 PROCEDURE — 3008F BODY MASS INDEX DOCD: CPT | Performed by: FAMILY MEDICINE

## 2020-03-11 PROCEDURE — T1015 CLINIC SERVICE: HCPCS | Performed by: FAMILY MEDICINE

## 2020-03-11 PROCEDURE — 3077F SYST BP >= 140 MM HG: CPT | Performed by: FAMILY MEDICINE

## 2020-03-11 RX ORDER — SACCHAROMYCES BOULARDII 250 MG
250 CAPSULE ORAL 2 TIMES DAILY
Qty: 60 CAPSULE | Refills: 2 | Status: SHIPPED | OUTPATIENT
Start: 2020-03-11 | End: 2020-04-03

## 2020-03-11 RX ORDER — LISINOPRIL 40 MG/1
40 TABLET ORAL DAILY
Qty: 30 TABLET | Refills: 5 | Status: SHIPPED | OUTPATIENT
Start: 2020-03-11 | End: 2020-10-23

## 2020-03-11 RX ORDER — CHLORTHALIDONE 25 MG/1
25 TABLET ORAL DAILY
Qty: 30 TABLET | Refills: 5 | Status: SHIPPED | OUTPATIENT
Start: 2020-03-11 | End: 2020-03-18

## 2020-03-11 NOTE — PROGRESS NOTES
Assessment/Plan:    Essential hypertension  At goal per JNC 8 guidelines:No  Home SBP values ranges between 140-155  Current Medication regime includes ACEI/ARB: Yes  Microalb/Cr ratio:  Lab Results   Component Value Date    MICROALBCRE 21 10/15/2019     ASCVD risk: 27 7%  Statin currently used: Yes  Current Alcohol Usage: No, Counseled on usage N/A  Current Cigarette smoker: No, ready to quit N/A, Counseled on usage N/A  Current diet involves salt restriction: Yes  Currently Physical Active: No,     Have counseled pt on adequate vegetable and fruit intake, whole grains and legumes  Avoid high dairy and meat consumption to continue to sustain weight loss  Have encouraged physical activity 3-5x per week    Advise a blood pressure recheck in 1 week and for patient to bring his cuff in order to make sure that is functioning properly  Prediabetes  Repeat A1c, and if persistently elevated initiate Metformin  Counseled pt on benefits of weight loss; referral to weight mgmt placed  Diagnoses and all orders for this visit:    Need for vaccination  -     influenza vaccine, [id number], quadrivalent, recombinant, PF, 0 5 mL, for patients 18 yr+ (FLUBLOK)    Colon cancer screening  -     Ambulatory referral to Gastroenterology    Essential hypertension  -     lisinopril (ZESTRIL) 40 mg tablet; Take 1 tablet (40 mg total) by mouth daily  -     chlorthalidone 25 mg tablet; Take 1 tablet (25 mg total) by mouth daily  -     Basic metabolic panel; Future    Prediabetes  -     HEMOGLOBIN A1C W/ EAG ESTIMATION; Future    Morbid obesity with BMI of 40 0-44 9, adult ([facility] [address] )  -     Ambulatory referral to Weight Management; Future    Diarrhea, unspecified type  -     saccharomyces boulardii (FLORASTOR) 250 mg capsule; Take 1 capsule (250 mg total) by mouth 2 (two) times a day          Subjective:      Patient ID: Jake Hansen is a 60 y o  male  Jake Hansen is a 60 y o   Male, presents for routine f/u of chronic conditions  Today his main concern is his blood pressure which has been found to be high in the office  He reports compliance w/ his medications and has been monitoring his bp at home  He has not been compliant w/ lifestyle modifications dicussed at the last visit  He complaints of myalgias when taking Lipitor, saying that is causes exaustion and muscle pain in his shoulders and quadriceps  The following portions of the patient's history were reviewed and updated as appropriate: He  has a past medical history of Hypertension and Prediabetes  Patient Active Problem List    Diagnosis Date Noted    Eczema 10/15/2019    Diarrhea 10/15/2019    Prediabetes 03/14/2019    Encounter for screening colonoscopy 03/14/2019    Screening for viral disease 03/14/2019    Class 3 severe obesity due to excess calories with body mass index (BMI) of 40 0 to 44 9 in adult [facility]) 02/28/2019    Hematospermia 07/19/2018    Essential hypertension 07/19/2018     He  has no past surgical history on file  His family history includes Coronary artery disease in his brother, father, mother, and sister; Hypertension in his brother, father, mother, paternal uncle, and sister; Stroke in his paternal uncle  He  reports that he has never smoked  He has never used smokeless tobacco  He reports that he does not drink alcohol or use drugs    Current Outpatient Medications   Medication Sig Dispense Refill    chlorthalidone 25 mg tablet Take 1 tablet (25 mg total) by mouth daily 30 tablet 5    lisinopril (ZESTRIL) 40 mg tablet Take 1 tablet (40 mg total) by mouth daily 30 tablet 5    famotidine (PEPCID) 20 mg tablet Take 1 tablet (20 mg total) by mouth 2 (two) times a day (Patient not taking: Reported on 3/11/2020) 30 tablet 0    hydrocortisone 2 5 % ointment Apply topically 2 (two) times a day for 7 days 20 g 0    ondansetron (ZOFRAN) 4 mg tablet Take 1 tablet (4 mg total) by mouth every 12 (twelve) hours as needed for nausea (Patient not taking: Reported on 3/11/2020) 12 tablet 0    saccharomyces boulardii (FLORASTOR) 250 mg capsule Take 1 capsule (250 mg total) by mouth 2 (two) times a day 60 capsule 2    sucralfate (CARAFATE) 1 g tablet Take 1 tablet (1 g total) by mouth 4 (four) times a day (Patient not taking: Reported on 3/11/2020) 40 tablet 0     No current facility-administered medications for this visit  Current Outpatient Medications on File Prior to Visit   Medication Sig    [DISCONTINUED] chlorthalidone 25 mg tablet Take 1 tablet (25 mg total) by mouth daily    [DISCONTINUED] lisinopril (ZESTRIL) 40 mg tablet Take 1 tablet (40 mg total) by mouth daily    famotidine (PEPCID) 20 mg tablet Take 1 tablet (20 mg total) by mouth 2 (two) times a day (Patient not taking: Reported on 3/11/2020)    hydrocortisone 2 5 % ointment Apply topically 2 (two) times a day for 7 days    ondansetron (ZOFRAN) 4 mg tablet Take 1 tablet (4 mg total) by mouth every 12 (twelve) hours as needed for nausea (Patient not taking: Reported on 3/11/2020)    sucralfate (CARAFATE) 1 g tablet Take 1 tablet (1 g total) by mouth 4 (four) times a day (Patient not taking: Reported on 3/11/2020)    [DISCONTINUED] aspirin 81 MG tablet Take 1 tablet (81 mg total) by mouth daily (Patient not taking: Reported on 3/11/2020)    [DISCONTINUED] atorvastatin (LIPITOR) 20 mg tablet Take 1 tablet (20 mg total) by mouth daily (Patient not taking: Reported on 3/11/2020)    [DISCONTINUED] saccharomyces boulardii (FLORASTOR) 250 mg capsule Take 1 capsule (250 mg total) by mouth 2 (two) times a day (Patient not taking: Reported on 3/11/2020)     No current facility-administered medications on file prior to visit  He is allergic to amlodipine       Review of Systems   Constitutional: Negative for appetite change, diaphoresis and fever  Eyes: Negative for visual disturbance  Respiratory: Negative for cough, shortness of breath and wheezing      Cardiovascular: Negative for chest pain and palpitations  Gastrointestinal: Negative for abdominal pain, constipation and diarrhea  Neurological: Negative for dizziness, light-headedness and headaches  Objective:      BP (!) 184/70 (BP Location: Left arm, Patient Position: Sitting, Cuff Size: Large)   Pulse 81   Temp 98 °F (36 7 °C) (Tympanic)   Resp 16   Ht 5' 9" (1 753 m)   Wt 133 kg (293 lb 9 6 oz)   SpO2 98%   BMI 43 36 kg/m²          Physical Exam   Constitutional: He appears well-developed and well-nourished  No distress  HENT:   Nose: Nose normal    Mouth/Throat: Oropharynx is clear and moist    Eyes: Conjunctivae and EOM are normal    Cardiovascular: Normal rate, regular rhythm and normal heart sounds  Pulmonary/Chest: Effort normal and breath sounds normal  No respiratory distress  He has no wheezes  Musculoskeletal: He exhibits edema  He exhibits no tenderness  Trace pitting edema bilaterally   Neurological: He is alert  Skin: Skin is warm and dry  He is not diaphoretic  Vitals reviewed  BMI Counseling: Body mass index is 43 36 kg/m²  The BMI is above normal  Nutrition recommendations include reducing portion sizes, decreasing overall calorie intake, 3-5 servings of fruits/vegetables daily and reducing fast food intake  Exercise recommendations include exercising 3-5 times per week

## 2020-03-11 NOTE — ASSESSMENT & PLAN NOTE
Repeat A1c, and if persistently elevated initiate Metformin  Counseled pt on benefits of weight loss; referral to weight mgmt placed

## 2020-03-11 NOTE — PATIENT INSTRUCTIONS
Qué puede hacer para disminuir lares presión arterial:    Plan de alimentación utilizando la dieta DASH  La dieta DASH consiste en alimentos ricos en: frutas, vegetales, leche (ya sea baja ó mode de grasas), granos integral, pescado, carne proveniente de aves (ej: kraig), granos, semillas y nueces  También contiene alimentos con yudith cantidad de sodio, azúcar, bebidas que Jc Franco azúcar, al igual que bajo en grasas y en la cantidad de porción en dioni ramsay de la cual consiste la típica Judith Elian  Eloise estilo de alimentación de dieta saludable para el hospitals, tambien consiste en alimentos bajos en grasas saturadas, trans-fat y cholesterol, al igual que es un estilo de dieta leon en nutrientes que están asociados en ayudar a disminuir la presión arterial, giana por ejemplo: alimentos ricos en potasio, magnesio, calcio, proteínas y fibras  Granos: 6 a 8 porciones al día: 1 rebanada de pan integral, 1 onza de cereal seco o 1/2 taza de cereal cocido, arroz o pasta  Vegetales: 4 a 5 porciones al día: 1 taza de vegetales de hoja anurag o 1/2 taza de vegetales cocidos  Frutas: 4 a 5 porciones al día: 1 fruta de tamaño mediano 1/2 taza de frutas frescas, congeladas o enlatadas o 4 onzas de jugo  Lacteos: 2 a 3 porciones al día:  1 taza de [address] de 1 porciento, 1 taza de yogurt bajo en grasa, o 1 1/2 onzas de queso semi-descremado  Dioni bajo en grasa, proveniente de aves y pescados:  6 porciones que contengan 1 onza o menos al día que contengan: 1 huevo o 1 onza de carne cocida, proveniente de aves o pescado  Grasas y aceites:  2 a 3 porciones al día: 1 cucharada de [place], 1 cucharada de [facility] o 2 cucharadas de aderezo para ensaladas  Dulces: 5 porciones o menos a la semana: 1 cucharada de azúcar, jalea o mermelada, 1/2 taza de helado o 1 taza de limonada   (Al momento de ingerir dulces, escoga 1 de las opciones)    Se ha demostrado que limitar la cantidad de sodio ingerida ayuda a reducir la presión arterial  En especial, limitar la cantidad de sodio ingerido a 2,400 mg de sodio al día, lo cual es el equivalente a 1 cucharadita de ranjeet    Es recomendado realizar ejercicios aeróbicos 30-40 minutos al día, al menos 3-5 rojo en la semana  Ejercicios aeróbicos son actividades giana por ejemplo: caminar ligero, correr/trotar, bailar, nadar o correr bicicleta  Uso de alcohol y Francisco J Igor o evitar el uso de alcohol ayuda a reducir de león presión alterial  Es recomendado en hombres ingerir menos de 2 bebidas alcohólicas al tina y en mujeres menos de 1 bebida alcohólica al día  El uso de cigarillo sanchez demostrado que aumenta significativamente la presión arterial al igual que aumenta el riesgo de ataque al [place] y derrame cerebral  Es altamente recomendado el dejar de fumar, y de león doctor le puede proveer ayuda en cuanto a medicamentos recomendados al igual que recursos para recibir consejería mariella el proceso de dejar de fumar  Monitoreo de presión arterial en el hogar  Asegúrese que esta relajado antes de tomarse la presión en el hogar  Debe estar sentado con clover pies firmes en el suelo  De León brazo en el cual se tomara la presión arterial debe estar relajado, descansando sobre la butt o al nivel de de león [facility]  Mantenga un record de clover lecturas de presión arterial y téngalos con usted mariella de león próxima visita con de león doctor

## 2020-03-11 NOTE — ASSESSMENT & PLAN NOTE
At goal per JNC 8 guidelines:No  Home SBP values ranges between 140-155  Current Medication regime includes ACEI/ARB: Yes  Microalb/Cr ratio:  Lab Results   Component Value Date    MICROALBCRE 21 10/15/2019     ASCVD risk: 27 7%  Statin currently used: Yes  Current Alcohol Usage: No, Counseled on usage N/A  Current Cigarette smoker: No, ready to quit N/A, Counseled on usage N/A  Current diet involves salt restriction: Yes  Currently Physical Active: No,     Have counseled pt on adequate vegetable and fruit intake, whole grains and legumes  Avoid high dairy and meat consumption to continue to sustain weight loss  Have encouraged physical activity 3-5x per week    Advise a blood pressure recheck in 1 week and for patient to bring his cuff in order to make sure that is functioning properly

## 2020-03-18 ENCOUNTER — TELEPHONE (OUTPATIENT)
Dept: FAMILY MEDICINE CLINIC | Facility: CLINIC | Age: 65
End: 2020-03-18

## 2020-03-18 ENCOUNTER — CLINICAL SUPPORT (OUTPATIENT)
Dept: FAMILY MEDICINE CLINIC | Facility: CLINIC | Age: 65
End: 2020-03-18

## 2020-03-18 VITALS — HEART RATE: 67 BPM | DIASTOLIC BLOOD PRESSURE: 90 MMHG | SYSTOLIC BLOOD PRESSURE: 170 MMHG

## 2020-03-18 DIAGNOSIS — I10 ESSENTIAL HYPERTENSION: Primary | ICD-10-CM

## 2020-03-18 RX ORDER — METOPROLOL SUCCINATE 25 MG/1
25 TABLET, EXTENDED RELEASE ORAL DAILY
Qty: 30 TABLET | Refills: 0 | Status: SHIPPED | OUTPATIENT
Start: 2020-03-18 | End: 2020-04-17

## 2020-03-18 NOTE — TELEPHONE ENCOUNTER
Pt seen today for NV for BP check, BP today was 170/90 in the L arm using the large cuff With a pulse of 67  Pt also brought his BP machine in and took it and it was 202/98 in the L arm with a pulse of 74,  looking at the cuff  Size it appeared too small for the pts arm for an accurate reading

## 2020-03-19 NOTE — TELEPHONE ENCOUNTER
Salvador Robles,    That makes sense, as the smaller cuff will overshoot his blood pressure  I believe that the patient would benefit from an additional bp agent  I have sent Metoprolol 25 mg daily for him to start on  Please return back in 2 weeks for bp check w/ nurse  Thank you!

## 2020-04-03 ENCOUNTER — TELEMEDICINE (OUTPATIENT)
Dept: BARIATRICS | Facility: CLINIC | Age: 65
End: 2020-04-03
Payer: COMMERCIAL

## 2020-04-03 VITALS
HEART RATE: 63 BPM | DIASTOLIC BLOOD PRESSURE: 76 MMHG | SYSTOLIC BLOOD PRESSURE: 146 MMHG | BODY MASS INDEX: 44.3 KG/M2 | WEIGHT: 300 LBS

## 2020-04-03 DIAGNOSIS — E66.01 CLASS 3 SEVERE OBESITY DUE TO EXCESS CALORIES WITH BODY MASS INDEX (BMI) OF 40.0 TO 44.9 IN ADULT, UNSPECIFIED WHETHER SERIOUS COMORBIDITY PRESENT (HCC): Primary | ICD-10-CM

## 2020-04-03 DIAGNOSIS — E66.01 MORBID OBESITY WITH BMI OF 40.0-44.9, ADULT (HCC): ICD-10-CM

## 2020-04-03 DIAGNOSIS — R73.03 PREDIABETES: ICD-10-CM

## 2020-04-03 DIAGNOSIS — I10 ESSENTIAL HYPERTENSION: ICD-10-CM

## 2020-04-03 PROBLEM — Z11.59 SCREENING FOR VIRAL DISEASE: Status: RESOLVED | Noted: 2019-03-14 | Resolved: 2020-04-03

## 2020-04-03 PROBLEM — R19.7 DIARRHEA: Status: RESOLVED | Noted: 2019-10-15 | Resolved: 2020-04-03

## 2020-04-03 PROCEDURE — G2012 BRIEF CHECK IN BY MD/QHP: HCPCS | Performed by: FAMILY MEDICINE

## 2020-04-17 DIAGNOSIS — I10 ESSENTIAL HYPERTENSION: ICD-10-CM

## 2020-04-17 RX ORDER — METOPROLOL SUCCINATE 25 MG/1
TABLET, EXTENDED RELEASE ORAL
Qty: 30 TABLET | Refills: 0 | Status: SHIPPED | OUTPATIENT
Start: 2020-04-17 | End: 2020-05-24

## 2020-05-05 ENCOUNTER — TELEMEDICINE (OUTPATIENT)
Dept: BARIATRICS | Facility: CLINIC | Age: 65
End: 2020-05-05
Payer: COMMERCIAL

## 2020-05-05 VITALS — WEIGHT: 300 LBS | HEIGHT: 69 IN | BODY MASS INDEX: 44.43 KG/M2

## 2020-05-05 DIAGNOSIS — R73.03 PREDIABETES: ICD-10-CM

## 2020-05-05 DIAGNOSIS — I10 ESSENTIAL HYPERTENSION: ICD-10-CM

## 2020-05-05 DIAGNOSIS — E66.01 CLASS 3 SEVERE OBESITY DUE TO EXCESS CALORIES WITH BODY MASS INDEX (BMI) OF 40.0 TO 44.9 IN ADULT, UNSPECIFIED WHETHER SERIOUS COMORBIDITY PRESENT (HCC): Primary | ICD-10-CM

## 2020-05-05 PROCEDURE — 99214 OFFICE O/P EST MOD 30 MIN: CPT | Performed by: FAMILY MEDICINE

## 2020-05-22 DIAGNOSIS — I10 ESSENTIAL HYPERTENSION: ICD-10-CM

## 2020-05-24 RX ORDER — METOPROLOL SUCCINATE 25 MG/1
TABLET, EXTENDED RELEASE ORAL
Qty: 30 TABLET | Refills: 0 | Status: SHIPPED | OUTPATIENT
Start: 2020-05-24 | End: 2020-06-23 | Stop reason: SDUPTHER

## 2020-06-23 DIAGNOSIS — I10 ESSENTIAL HYPERTENSION: ICD-10-CM

## 2020-06-25 RX ORDER — METOPROLOL SUCCINATE 25 MG/1
25 TABLET, EXTENDED RELEASE ORAL DAILY
Qty: 90 TABLET | Refills: 1 | Status: SHIPPED | OUTPATIENT
Start: 2020-06-25 | End: 2020-12-24 | Stop reason: SDUPTHER

## 2020-10-22 DIAGNOSIS — I10 ESSENTIAL HYPERTENSION: ICD-10-CM

## 2020-10-23 ENCOUNTER — TELEPHONE (OUTPATIENT)
Dept: FAMILY MEDICINE CLINIC | Facility: CLINIC | Age: 65
End: 2020-10-23

## 2020-10-23 RX ORDER — LISINOPRIL 40 MG/1
TABLET ORAL
Qty: 30 TABLET | Refills: 0 | Status: SHIPPED | OUTPATIENT
Start: 2020-10-23 | End: 2020-11-25

## 2020-10-30 DIAGNOSIS — I10 ESSENTIAL HYPERTENSION: ICD-10-CM

## 2020-11-02 RX ORDER — CHLORTHALIDONE 25 MG/1
TABLET ORAL
Qty: 30 TABLET | Refills: 4 | OUTPATIENT
Start: 2020-11-02

## 2020-11-25 DIAGNOSIS — I10 ESSENTIAL HYPERTENSION: ICD-10-CM

## 2020-11-25 RX ORDER — LISINOPRIL 40 MG/1
TABLET ORAL
Qty: 30 TABLET | Refills: 0 | Status: SHIPPED | OUTPATIENT
Start: 2020-11-25 | End: 2021-01-05

## 2020-12-24 DIAGNOSIS — I10 ESSENTIAL HYPERTENSION: ICD-10-CM

## 2020-12-24 RX ORDER — METOPROLOL SUCCINATE 25 MG/1
25 TABLET, EXTENDED RELEASE ORAL DAILY
Qty: 30 TABLET | Refills: 0 | Status: SHIPPED | OUTPATIENT
Start: 2020-12-24 | End: 2021-01-20 | Stop reason: SDUPTHER

## 2020-12-31 DIAGNOSIS — I10 ESSENTIAL HYPERTENSION: ICD-10-CM

## 2021-01-05 RX ORDER — LISINOPRIL 40 MG/1
TABLET ORAL
Qty: 30 TABLET | Refills: 3 | Status: SHIPPED | OUTPATIENT
Start: 2021-01-05 | End: 2021-07-07 | Stop reason: SDUPTHER

## 2021-01-20 DIAGNOSIS — I10 ESSENTIAL HYPERTENSION: ICD-10-CM

## 2021-01-20 RX ORDER — METOPROLOL SUCCINATE 25 MG/1
25 TABLET, EXTENDED RELEASE ORAL DAILY
Qty: 30 TABLET | Refills: 2 | Status: SHIPPED | OUTPATIENT
Start: 2021-01-20 | End: 2021-02-05 | Stop reason: SDUPTHER

## 2021-02-04 PROBLEM — E78.6 LOW HDL (UNDER 40): Status: ACTIVE | Noted: 2021-02-04

## 2021-02-04 NOTE — PROGRESS NOTES
Assessment/Plan:    Essential hypertension  BP goal per JNC-8 criteria <150/90, not well controlled   Continue with Lisinopril 40 mg QD  Restart Metoprolol 25 mg daily  Continue with daily exercise and healthy diet  Follows with bariatric surgery for weight loss   Will recheck microalbumin/creatinine urine ratio    Prediabetes  Labs from August 2019 with elevated BG at 163  POCT HbA1c today showed A1c at 6 1  Continue with low carb diet and exercise    Low HDL (under 40)  Will recheck lipid panel; ASCVD 10 year CV risk 17 9%  Per chart review previously on Lipitor, which caused him myalgias, muscle pain in his shoulders and quadriceps  Adhere to low fat diet and exercise       Diagnoses and all orders for this visit:    Essential hypertension  -     metoprolol succinate (TOPROL-XL) 25 mg 24 hr tablet; Take 1 tablet (25 mg total) by mouth daily    Prediabetes  -     Microalbumin / creatinine urine ratio  -     POCT hemoglobin A1c  -     Comprehensive metabolic panel; Future    Low HDL (under 40)  -     Lipid Panel with Direct LDL reflex; Future    Encounter for vaccination  -     influenza vaccine, quadrivalent, recombinant, PF, 0 5 mL, for patients 18 yr+ (FLUBLOK)    Encounter for hepatitis C screening test for low risk patient  -     Hepatitis C antibody; Future    Screening for prostate cancer  -     PSA, total and free; Future          Subjective:      Patient ID: Douglas Justice is a 60 y o  male  HPI     Patient endorses no immediate concerns  States he is taking Lisinopril 40 mg daily  Mentions he hasn't been taking Metoprolol for the past couple months  Reports when he got the got the medication In the pharmacy he wasn't sure if he still needed it and decided to wait until he comes to the appt  Denies currently any CV complaints          The following portions of the patient's history were reviewed and updated as appropriate: allergies, current medications, past family history, past medical history, past social history, past surgical history and problem list     Review of Systems   Constitutional: Negative for chills and fever  HENT: Negative for sore throat  Eyes: Negative for visual disturbance  Respiratory: Negative  Negative for cough and shortness of breath  Cardiovascular: Negative for chest pain and leg swelling  Gastrointestinal: Negative  Negative for abdominal pain, blood in stool, constipation, diarrhea, nausea and vomiting  Genitourinary: Negative  Negative for dysuria and hematuria  Skin: Negative for rash  Neurological: Negative for dizziness and headaches  Psychiatric/Behavioral: The patient is not nervous/anxious  Objective:      /88 (BP Location: Right arm, Patient Position: Sitting, Cuff Size: Adult)   Pulse 80   Temp 97 6 °F (36 4 °C) (Temporal)   Resp 22   Ht 5' 9" (1 753 m)   Wt (!) 137 kg (302 lb 8 oz)   SpO2 98%   BMI 44 67 kg/m²          Physical Exam  Vitals signs and nursing note reviewed  Constitutional:       General: He is not in acute distress  Appearance: Normal appearance  He is well-developed  He is obese  He is not ill-appearing, toxic-appearing or diaphoretic  HENT:      Head: Normocephalic and atraumatic  Nose: Nose normal    Eyes:      Extraocular Movements: Extraocular movements intact  Conjunctiva/sclera: Conjunctivae normal       Pupils: Pupils are equal, round, and reactive to light  Neck:      Musculoskeletal: Normal range of motion and neck supple  Cardiovascular:      Rate and Rhythm: Normal rate and regular rhythm  Heart sounds: Normal heart sounds  Pulmonary:      Effort: Pulmonary effort is normal  No respiratory distress  Breath sounds: Normal breath sounds  Abdominal:      General: Bowel sounds are normal  There is no distension  Palpations: Abdomen is soft  Tenderness: There is no abdominal tenderness  Musculoskeletal: Normal range of motion        Right lower leg: No edema  Left lower leg: No edema  Skin:     General: Skin is warm  Findings: No rash  Neurological:      Mental Status: He is alert and oriented to person, place, and time  Psychiatric:         Mood and Affect: Mood normal          Behavior: Behavior normal          Thought Content:  Thought content normal          Judgment: Judgment normal

## 2021-02-04 NOTE — ASSESSMENT & PLAN NOTE
BP goal per JNC-8 criteria <150/90, not well controlled   Continue with Lisinopril 40 mg QD    Restart Metoprolol 25 mg daily  Continue with daily exercise and healthy diet  Follows with bariatric surgery for weight loss   Will recheck microalbumin/creatinine urine ratio

## 2021-02-04 NOTE — ASSESSMENT & PLAN NOTE
Labs from August 2019 with elevated BG at 163  POCT HbA1c today showed A1c at 6 1  Continue with low carb diet and exercise

## 2021-02-04 NOTE — ASSESSMENT & PLAN NOTE
Will recheck lipid panel; ASCVD 10 year CV risk 17 9%  Per chart review previously on Lipitor, which caused him myalgias, muscle pain in his shoulders and quadriceps     Adhere to low fat diet and exercise

## 2021-02-05 ENCOUNTER — OFFICE VISIT (OUTPATIENT)
Dept: FAMILY MEDICINE CLINIC | Facility: CLINIC | Age: 66
End: 2021-02-05

## 2021-02-05 VITALS
BODY MASS INDEX: 44.8 KG/M2 | RESPIRATION RATE: 22 BRPM | SYSTOLIC BLOOD PRESSURE: 156 MMHG | HEART RATE: 80 BPM | TEMPERATURE: 97.6 F | HEIGHT: 69 IN | DIASTOLIC BLOOD PRESSURE: 88 MMHG | WEIGHT: 302.5 LBS | OXYGEN SATURATION: 98 %

## 2021-02-05 DIAGNOSIS — E78.6 LOW HDL (UNDER 40): ICD-10-CM

## 2021-02-05 DIAGNOSIS — R73.03 PREDIABETES: ICD-10-CM

## 2021-02-05 DIAGNOSIS — Z11.59 ENCOUNTER FOR HEPATITIS C SCREENING TEST FOR LOW RISK PATIENT: ICD-10-CM

## 2021-02-05 DIAGNOSIS — Z12.5 SCREENING FOR PROSTATE CANCER: ICD-10-CM

## 2021-02-05 DIAGNOSIS — I10 ESSENTIAL HYPERTENSION: Primary | ICD-10-CM

## 2021-02-05 DIAGNOSIS — Z23 ENCOUNTER FOR VACCINATION: ICD-10-CM

## 2021-02-05 LAB
CREAT UR-MCNC: 186 MG/DL
MICROALBUMIN UR-MCNC: 172 MG/L (ref 0–20)
MICROALBUMIN/CREAT 24H UR: 92 MG/G CREATININE (ref 0–30)
SL AMB POCT HEMOGLOBIN AIC: 6.1 (ref ?–6.5)

## 2021-02-05 PROCEDURE — 90471 IMMUNIZATION ADMIN: CPT | Performed by: FAMILY MEDICINE

## 2021-02-05 PROCEDURE — 82043 UR ALBUMIN QUANTITATIVE: CPT | Performed by: FAMILY MEDICINE

## 2021-02-05 PROCEDURE — 83036 HEMOGLOBIN GLYCOSYLATED A1C: CPT | Performed by: FAMILY MEDICINE

## 2021-02-05 PROCEDURE — 82570 ASSAY OF URINE CREATININE: CPT | Performed by: FAMILY MEDICINE

## 2021-02-05 PROCEDURE — 90682 RIV4 VACC RECOMBINANT DNA IM: CPT | Performed by: FAMILY MEDICINE

## 2021-02-05 PROCEDURE — 99213 OFFICE O/P EST LOW 20 MIN: CPT | Performed by: FAMILY MEDICINE

## 2021-02-05 RX ORDER — METOPROLOL SUCCINATE 25 MG/1
25 TABLET, EXTENDED RELEASE ORAL DAILY
Qty: 30 TABLET | Refills: 2 | Status: SHIPPED | OUTPATIENT
Start: 2021-02-05 | End: 2021-07-07 | Stop reason: ALTCHOICE

## 2021-02-05 NOTE — PATIENT INSTRUCTIONS
Lifestyle Medicine Tip Sheet- [place]    1  Coma alimentos predominantemente menos procesados, giana comida rápida, cenas de televisión y tocino  2  Coma cerca de la naturaleza (mercados de agricultores, productos frescos o congelados)    3  Coma dion dieta predominantemente basada en plantas  a  Verdes frondosos oscuros katherine   b  Frutas y vegetales  c   Granos integrales: maritza integral, apenas, bayas de maritza, quinua, jimmy cortada en cha, arroz integral, pasta integral   d  Legumbres: frijoles, frijoles pintos, frijoles blancos, frijoles negros, garbanzos (garbanzos), frijoles lima (maduros, secos), arvejas, lentejas y edamame (frijoles de soya verdes)      4  Al menos la mitad del plato debe contener frutas o verduras  5  El líquido debe ser predominantemente agua (limite el refresco y [place])    6  Yareli el tamaño de la porción  7  ¿Qué alimentos cornelius evitar o limitar? - Grasas: Específicamente saturadas y grasas trans  Se encuentran en margarinas, muchas comidas rápidas y algunos productos horneados comprados en la kendra  Las grasas saturadas y grasas trans pueden elevar lares nivel de colesterol y lares probabilidad de contraer enfermedades cardíacas  - Cuando cocine, es mejor no usar aceites, kelin si es necesario, trate de limitar la cantidad de aceite utilizado, ya que el aceite contiene muchas calorías por volumen y es muy poco saludable cuando se calienta mariella la cocción   - Azúcar: limite o evite el azúcar, los dulces y los granos refinados  Los granos refinados se encuentran en el pan reinoso, el arroz reinoso, la mayoría de las formas de pasta y la mayoría de los alimentos "aperitivos" envasados  - Intente no cocinar con ranjeet y evite agregar ranjeet adicional a clover comidas  - Nazario Ortega: los estudios hyatt demostrado que comer mucha minnie Jenkins riesgo de ciertos problemas de [place], giana enfermedades cardíacas y cáncer  8  7-9 horas de sueño    9   Ejercicio diario mínimo de 30 minutos (caminando alrededor de la tylor)    10  Socialización (amigos y familiares)    - Explora tu vecindario  Ve al parque, pasa tiempo en la biblioteca  Si está interesado, puede leer más sobre las opciones de alimentos saludables en los siguientes sitios web:  a  [employer]  org  b  Home cooking recipes: https://www [employer]/  c  http://tanvir info/  d  Familydoctor  org

## 2021-02-16 DIAGNOSIS — I10 ESSENTIAL HYPERTENSION: ICD-10-CM

## 2021-02-16 RX ORDER — LISINOPRIL 40 MG/1
40 TABLET ORAL DAILY
Qty: 30 TABLET | Refills: 3 | OUTPATIENT
Start: 2021-02-16

## 2021-05-24 ENCOUNTER — TELEPHONE (OUTPATIENT)
Dept: FAMILY MEDICINE CLINIC | Facility: CLINIC | Age: 66
End: 2021-05-24

## 2021-05-24 NOTE — TELEPHONE ENCOUNTER
Form picked up by clinical 05/24/21  This is a disability form which requires a visit to complete  Please schedule  I will hold onto form until I can give to the clinical person assigned to work with the provider he is scheduled with

## 2021-05-24 NOTE — TELEPHONE ENCOUNTER
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

## 2021-05-27 NOTE — TELEPHONE ENCOUNTER
It's not actually a disability form, my mistake  No signature required from physician  I was able to fill out the medical part of the form based on a chart review but they are going to need to fill in his job history    Left message that form is ready for   Completed form copied for chart/placed in scan bin      Original form placed in front office  bin

## 2021-05-28 ENCOUNTER — TELEPHONE (OUTPATIENT)
Dept: FAMILY MEDICINE CLINIC | Facility: CLINIC | Age: 66
End: 2021-05-28

## 2021-07-06 NOTE — ASSESSMENT & PLAN NOTE
BP goal per JNC-8 criteria <150/90, not at goal  Patient is asymptomatic today  Will refill Lisinopril 40 mg QD  Mentions he does not want to be back on Metoprolol 25 mg QD as it's causing him to gain weight and feel tired  Will await BP check with nurse in 3 weeks to guide therapy   Continue with daily exercise and healthy diet   Weight loss counseling  Microalbuminuria (February, 2021) will aim for tighter BP control with repeat in 1 year

## 2021-07-06 NOTE — PROGRESS NOTES
Assessment/Plan:    Essential hypertension  BP goal per JNC-8 criteria <150/90, not at goal  Patient is asymptomatic today  Will refill Lisinopril 40 mg QD  Mentions he does not want to be back on Metoprolol 25 mg QD as it's causing him to gain weight and feel tired  Will await BP check with nurse in 3 weeks to guide therapy   Continue with daily exercise and healthy diet  Weight loss counseling  Microalbuminuria (February, 2021) will aim for tighter BP control with repeat in 1 year    Class 3 severe obesity due to excess calories with body mass index (BMI) of 40 0 to 44 9 in adult ([facility] )  BMI Counseling: There is no height or weight on file to calculate BMI  The BMI is above normal  Nutrition recommendations include reducing portion sizes, decreasing overall calorie intake, 3-5 servings of fruits/vegetables daily, reducing fast food intake, consuming healthier snacks, decreasing soda and/or juice intake, moderation in carbohydrate intake, increasing intake of lean protein and reducing intake of saturated fat and trans fat  Exercise recommendations include exercising 3-5 times per week  Pt states he lost >20 lbs in 3 months, congratulated patient  Weight loss counseling     Encounter for screening colonoscopy  General surgery consult for screening colonoscopy   Pt agreeable to schedule consultation  Denies BM changes        Diagnoses and all orders for this visit:    Essential hypertension  -     lisinopril (ZESTRIL) 40 mg tablet; Take 1 tablet (40 mg total) by mouth daily    Class 3 severe obesity due to excess calories with body mass index (BMI) of 40 0 to 44 9 in adult, unspecified whether serious comorbidity present [facility])    Encounter for screening colonoscopy  -     Ambulatory referral to General Surgery; Future          Subjective:      Patient ID: Sabra Contreras is a 73 y o  male  HPI     Patient presents today for HTN f/u visit  Patient states he's been out of BP medicine 1 month ago   He forgot to call the office and request refills  Denies chest chest pain, shortness of breath, LE edema, and visual disturbance  Preventative Care  Received COVID Moderna vaccine   Wants to wait until next visit for PPSV-23 vaccine  The following portions of the patient's history were reviewed and updated as appropriate: allergies, current medications, past family history, past medical history, past social history, past surgical history and problem list     Review of Systems   Constitutional: Negative for chills and fever  HENT: Negative for sore throat  Eyes: Negative for visual disturbance  Respiratory: Negative  Negative for cough and shortness of breath  Cardiovascular: Negative for chest pain and leg swelling  Gastrointestinal: Negative  Negative for abdominal pain, blood in stool, constipation, diarrhea, nausea and vomiting  Genitourinary: Negative  Negative for dysuria and hematuria  Skin: Negative for rash  Neurological: Negative for dizziness and headaches  Psychiatric/Behavioral: The patient is not nervous/anxious  Objective:    /84 (BP Location: Left arm, Patient Position: Sitting, Cuff Size: Large)   Pulse 86   Temp 97 6 °F (36 4 °C) (Temporal)   Resp 18   Ht 5' 9" (1 753 m)   Wt 126 kg (278 lb)   SpO2 98%   BMI 41 05 kg/m²        Physical Exam  Vitals and nursing note reviewed  Constitutional:       General: He is not in acute distress  Appearance: Normal appearance  He is well-developed  He is obese  He is not ill-appearing, toxic-appearing or diaphoretic  HENT:      Head: Normocephalic and atraumatic  Nose: Nose normal    Eyes:      General:         Right eye: No discharge  Left eye: No discharge  Extraocular Movements: Extraocular movements intact  Conjunctiva/sclera: Conjunctivae normal    Cardiovascular:      Rate and Rhythm: Normal rate and regular rhythm  Heart sounds: Normal heart sounds     Pulmonary:      Effort: Pulmonary effort is normal  No respiratory distress  Breath sounds: Normal breath sounds  Abdominal:      General: Bowel sounds are normal       Palpations: Abdomen is soft  Tenderness: There is no abdominal tenderness  Musculoskeletal:         General: Normal range of motion  Cervical back: Normal range of motion and neck supple  Right lower leg: No edema  Left lower leg: No edema  Skin:     General: Skin is warm  Findings: No rash  Neurological:      Mental Status: He is alert and oriented to person, place, and time  Psychiatric:         Mood and Affect: Mood normal          Behavior: Behavior normal          Thought Content:  Thought content normal          Judgment: Judgment normal

## 2021-07-06 NOTE — ASSESSMENT & PLAN NOTE
BMI Counseling: There is no height or weight on file to calculate BMI  The BMI is above normal  Nutrition recommendations include reducing portion sizes, decreasing overall calorie intake, 3-5 servings of fruits/vegetables daily, reducing fast food intake, consuming healthier snacks, decreasing soda and/or juice intake, moderation in carbohydrate intake, increasing intake of lean protein and reducing intake of saturated fat and trans fat  Exercise recommendations include exercising 3-5 times per week      Pt states he lost >20 lbs in 3 months, congratulated patient  Weight loss counseling

## 2021-07-07 ENCOUNTER — OFFICE VISIT (OUTPATIENT)
Dept: FAMILY MEDICINE CLINIC | Facility: CLINIC | Age: 66
End: 2021-07-07

## 2021-07-07 VITALS
OXYGEN SATURATION: 98 % | HEIGHT: 69 IN | RESPIRATION RATE: 18 BRPM | TEMPERATURE: 97.6 F | HEART RATE: 86 BPM | DIASTOLIC BLOOD PRESSURE: 84 MMHG | WEIGHT: 278 LBS | SYSTOLIC BLOOD PRESSURE: 162 MMHG | BODY MASS INDEX: 41.18 KG/M2

## 2021-07-07 DIAGNOSIS — E66.01 CLASS 3 SEVERE OBESITY DUE TO EXCESS CALORIES WITH BODY MASS INDEX (BMI) OF 40.0 TO 44.9 IN ADULT, UNSPECIFIED WHETHER SERIOUS COMORBIDITY PRESENT (HCC): ICD-10-CM

## 2021-07-07 DIAGNOSIS — I10 ESSENTIAL HYPERTENSION: Primary | ICD-10-CM

## 2021-07-07 DIAGNOSIS — Z12.11 ENCOUNTER FOR SCREENING COLONOSCOPY: ICD-10-CM

## 2021-07-07 PROCEDURE — 3077F SYST BP >= 140 MM HG: CPT | Performed by: FAMILY MEDICINE

## 2021-07-07 PROCEDURE — 99213 OFFICE O/P EST LOW 20 MIN: CPT | Performed by: FAMILY MEDICINE

## 2021-07-07 PROCEDURE — 3079F DIAST BP 80-89 MM HG: CPT | Performed by: FAMILY MEDICINE

## 2021-07-07 RX ORDER — LISINOPRIL 40 MG/1
40 TABLET ORAL DAILY
Qty: 30 TABLET | Refills: 3 | Status: SHIPPED | OUTPATIENT
Start: 2021-07-07 | End: 2021-08-09

## 2021-07-07 NOTE — PATIENT INSTRUCTIONS
Plan de alimentación con "enfoque dietético para detener la hipertensión (DASH, por clover siglas en inglés)   LO QUE NECESITA SABER:   El plan de alimentación DASH está diseñado para ayudar a prevenir o disminuir la hipertensión  También puede ayudar a bajar el colesterol samantha (colesterol LDL) y disminuír lares riesgo de enfermedad cardíaca  El plan es bajo en sodio, azúcar, grasas dañinas, y grasas en lares totalidad  Es alto en potasio, calcio, magnesio y [place]  Estos nutrientes se agregan al consumir más frutas, vegetales y granos enteros  INSTRUCCIONES SOBRE EL JULIET HOSPITALARIA:   Lares límite de sodio cada día: Lares dietista le indicará la cantidad de sodio que usted debe consumir a diario  La gente que tiene la presión arterial juliet debe consumir de 1,500 a 2,300 mg de sodio al día giana kandis  Dion cucharadita (cdta) de sal tiene 2,300 mg de sodio  [place] puede parecer giana dion meta difícil, kelin pequeños cambios en los alimentos que usted consume pueden hacer dion gran diferencia  Lares médico o dietista puede ayudarlo a crear un plan alimenticio que cumpla lares límite de sodio  Formas de limitar el sodio:  · Olivia las etiquetas de los alimentos  Las etiquetas pueden ayudarle a escoger alimentos bajos en sodio  La cantidad de sodio está incluida en miligramos (mg)  La columna del porcentaje de valor diario indica la cantidad de necesidades diarias satisfechas con 1 porción del alimento para cada nutriente en la lista  Escoja alimentos que tengan menos de 5% del porcentaje diario de [place]  Estos alimentos se consideran bajos en sodio  Los alimentos que tienen 20% o más del porcentaje diario de sodio se consideran alimentos altos en sodio  Evite alimentos que tengan más de 300 mg de sodio por porción  Escoja alimentos [place] diga que son bajos en sodio, con sodio reducido, o sin sal agregada           · Evite la sal  No le agregue sal a la comida cuando se siente a la butt a comer, y agregue muy poca sal a los alimentos mariella la cocción  Use hierbas y condimentos, giana cebollas, ajo y especias sin sal para agregar sabor a los alimentos  Use jugo de lima, ulisses o vinagre para darle a los alimentos un sabor ácido  Use chiles picantes o dion cantidad pequeña de salsa picante para agregar un sabor picante a los alimentos  · Pregunte sobre los sustitutos para la sal  Pregúntele a lares médico si es posible usar sustitutos de la sal  Algunos sustitutos de la sal vienen con ingredientes que pueden ser dañinos si usted tiene ciertos padecimientos médicos  · Escoja los alimentos cuidadosamente cuando sale a comer a restaurantes: las comidas de los restaurantes, sobre todo restaurantes de comida rápida, vic siempre son altas en sodio  Algunos restaurantes ofrecen información nutricional que indica la cantidad de sodio en clover alimentos  Pida que preparen clover comidas con menos sal o sin sal     Lo que necesita saber acerca de las grasas:  · Incluya grasas saludables  Las grasas insaturadas y los ácidos grasos omega-3 son ejemplos de grasas saludables  Las grasas insaturadas se encuentran en los aceites de soja, [place], [place] o [place] y la margarina [place] y [facility]  Los ácidos grasos [place] 3 se encuentran en el pescado con grasa, giana el salmón, el atún, la caballa y las jennifer  También se le puede encontrar en el aceita de linaza y en la linaza molida  · Evite las grasas insalubres  No consuma grasas dañinas, giana grasas saturadas y grasas trans  Las grasas saturadas se encuentran en alimentos que contienen grasa animal  Xavier Valenzuela son Tadeo Bosch grasosas, la [place], la [facility] york, la crema y otros productos lácteos  Además se pueden encontrar en la [place], la margarina en jalen, el aceite de anderson y el aceite de laine  Las grasas trans se encuentran en comidas fritas, galletas estilo soda, tortillitas tostadas, y alimentos horneados hechos con Dwyane Fitch      Alimentos que puede incluir: Con el plan de alimentación DASH, usted necesita consumir un número específico de porciones de cada sydnie de alimentos  [place] le ayudará a consumir las cantidades suficientes de ciertos nutrientes y limitar otros  La cantidad de porciones que usted debe comer depende de la cantidad de calorías que usted necesita  De [place] dietista le informará sobre cuántas calorías necesita usted  El número de porciones que viene en la lista al lado de los grupos alimenticios a continuación es para las personas que necesitan aproximadamente 2,000 calorías al día  · Granos: 6 a 8 porciones (3 de estas porciones deben ser de alimentos de granos enteros o integrales)    ? 1 tajada de pan integral    ? 1 onza de cereal seco    ? ½ taza de cereal cocinado, pasta, o arroz integral    · Verduras y frutas: 4 a 5 porciones de frutas y 4 a 5 porciones de vegetales    ? 1 fruta mediana    ? ½ taza de vegetales congelados, enlatados (sin sal adicional), o vegetales frescos y picados    ? ½ taza de fruta fresca, congelada, seca o enlatada (enlatada en sirope liviano o jugo de fruta)    ? ½ taza de jugo de verduras o frutas    · Productos lácteos: 2 a 3 porciones    ? [address] 1%    ? 1½ onzas de queso descremado o bajo en grasas y bajo en sodio    ? 6 onzas de yogurt descremado o bajo en grasas    · Dioni ramsay, dioni taryn y pescado magros: 6 onzas o menos    ? Dioni taryn (kraig, pavo) sin piel    ? Pescado (sobre todo pescado con grasa, giana salmón, atún fresco o FLOWER)    ? Carne de res o de cerdo magra (Rina Ramal extra [place])    ? Claras de huevo y sustitutos del huevo    · Thomas du sac, semillas y legumbres: 4 a 5 porciones por semana    ? ½ taza de frijoles y arbejas cocinadas    ? 1½ onzas de nueces sin sal    ? 2 cucharadas de mantequilla de maní o semillas    · Dulces y azúcares adicionales: 5 o menos por semana    ? 1 cucharada de azúcar, jalea o mermelada    ? ½ taza de sorbeto o gelatina    ?  1 taza de limonada    · Grasas: 2 a 3 porciones por semana    ? 1 cucharadita de margarina suave o aceite vegetal    ? 1 cucharada de mayonesa    ? 2 cucharadas de aderezo para [facility] [employer] se deben evitar:  · Granos:     ? Postres horneados o fritos giana donas, pastelitos, galletas, y quequitos (altos en grasas y azúcar)    ? Mezclas para hacer pan de maíz y pasteles, alimentos empacados, giana rellenos para pavo, mezclas para hacer arroz y pasta, macarrones con [place]-barre, y cereales instantáneos (altos en sodio)    · Ethdomi Caballero y verduras:     ? Vegetales regulares enlatados (altos en sodio)    ? Repollo preparado en vinagre, vegetales en vinagre, y otros alimentos preparados en escabeche (altos en sodio)    ? Vegetales fritos o vegetales en mantequilla o salsas altas en grasas    ? Tawana Caballero en crema o salsa de mantequilla (altas en grasas)    · Productos lácteos:     ? Leche entera, leche semi descremada (2%), y crema (linh en grasas)    ? Queso regular y [place]-barre procesado (alto en grasa y [place])    · Dioni y alimentos con proteínas:     ? Dioni ahumadas o curadas, giana carne de vicki en conserva, tocino, jamón, perros calientes, y salchicha (linh en grasas y [place])    ? Frijoles enlatados y dioni enlatadas o dioni en pasta, giana dioni en conserva, jennifer, anchoas y mariscos de imitación (altos en sodio)    ? Dioni para emparedados, giana [place], [place], [place], y carne de res en rebanada (altos en sodio)    ? Dioni altas en grasas (bistec estilo T-bone, carne molida para hamburguesas, costillas)    ? Huevos enteros y yemas de huevo (altos en grasas)    · Otros:     ? Condimentos hechos con sal, giana sal de ajo, sal de apio, sal de cebolla, sal condimentada, suavizantes para dioni, y glutamato de monosodio (MSG, por clover siglas en inglés)    ? Sopa Miso y mezclas para sopa enlatadas o secas (altas en sodio)    ?  Salsa de soya regular, salsa de [address] [facility], salsa [place], salsa para bistec, [employer], y la [address] (altas en sodio)    ? Condimentos regulares, giana mostaza, salsa de tomate y aderezos para ensalada (altos en sodio)    ? Salsa para haile y salsas en general, giana salsa Jared o de queso (altas en sodio y [place])    ? Bebidas altas en azúcar, giana bebidas gaseosas o jugos de frutas    ? Alimentos para merendar, giana yvette tostadas, palomitas de [place], pretzels, piel de cerdo, galletas de soda saladas, y nueces saladas    ? Alimentos congelados, giana cenas preparadas, platos principales, vegetales con salsas, y haile cubiertas en pan (altas en sodio)    Otras pautas que debe seguir:  · Mantenga un peso saludable  Lares riesgo de enfermedad cardíaca es aún más alto si usted tiene sobrepeso  Lares médico podría sugerirle que adelgace si tiene sobrepeso  Usted puede perder peso si se propone consumir menos calorías y alimentos que tengan azúcar y grasas agregadas  El plan de alimentación DASH puede ayudarle a lograrlo  Kadi buena forma de disminuir el consumo de calorías es consumiendo porciones más pequeñas en cada comida y menos meriendas entre comidas  Consulte a lares médico para obtener más información sobre cómo adelgazar  · Realice actividad física con regularidad  El ejercicio regular puede ayudarle a alcanzar o mantener un peso saludable  El ejercicio regular también puede ayudarle a disminuir lares presión arterial y mejorar clover niveles de colesterol  Jose E ejercicios moderados por 30 minutos o más todos los días de la [place]  Para bajar peso, asegúrese de ejercitarse por lo menos 60 minutos  Consulte con lares médico sobre un programa de ejercicio adecuado para usted  · Limite el consumo de alcohol  Las mujeres deberían limitar el consumo de alcohol a 1 bebida por día  Los hombres deberían limitar el consumo de alcohol a 2 tragos al día  Un trago equivale a 12 onzas de cerveza, 5 onzas de vino o 1 onza y ½ de licor      © Copyright [address] 2020 Information is for End User's use only and may not be sold, redistributed or otherwise used for commercial purposes  All illustrations and images included in CareNotes® are the copyrighted property of [employer]  or [address] [place] [address] es sólo para uso en educación  Lares intención no es darle un consejo médico sobre enfermedades o tratamientos  Colsulte con lares Star Saul farmacéutico antes de seguir cualquier régimen médico para saber si es seguro y efectivo para usted

## 2021-07-07 NOTE — ASSESSMENT & PLAN NOTE
General surgery consult for screening colonoscopy   Pt agreeable to schedule consultation  Denies BM changes

## 2021-08-03 ENCOUNTER — PREP FOR PROCEDURE (OUTPATIENT)
Dept: SURGERY | Facility: CLINIC | Age: 66
End: 2021-08-03

## 2021-08-03 ENCOUNTER — APPOINTMENT (OUTPATIENT)
Dept: LAB | Facility: CLINIC | Age: 66
End: 2021-08-03
Payer: COMMERCIAL

## 2021-08-03 ENCOUNTER — CONSULT (OUTPATIENT)
Dept: SURGERY | Facility: CLINIC | Age: 66
End: 2021-08-03
Payer: COMMERCIAL

## 2021-08-03 VITALS
HEIGHT: 69 IN | SYSTOLIC BLOOD PRESSURE: 148 MMHG | WEIGHT: 278 LBS | BODY MASS INDEX: 41.18 KG/M2 | HEART RATE: 82 BPM | DIASTOLIC BLOOD PRESSURE: 82 MMHG | TEMPERATURE: 97.9 F

## 2021-08-03 DIAGNOSIS — Z12.5 SCREENING FOR PROSTATE CANCER: ICD-10-CM

## 2021-08-03 DIAGNOSIS — Z12.11 SCREENING FOR COLON CANCER: Primary | ICD-10-CM

## 2021-08-03 DIAGNOSIS — E78.6 LOW HDL (UNDER 40): ICD-10-CM

## 2021-08-03 DIAGNOSIS — Z12.11 ENCOUNTER FOR SCREENING COLONOSCOPY: Primary | ICD-10-CM

## 2021-08-03 DIAGNOSIS — Z11.59 ENCOUNTER FOR HEPATITIS C SCREENING TEST FOR LOW RISK PATIENT: ICD-10-CM

## 2021-08-03 DIAGNOSIS — R73.03 PREDIABETES: ICD-10-CM

## 2021-08-03 LAB
ALBUMIN SERPL BCP-MCNC: 3.7 G/DL (ref 3.5–5)
ALP SERPL-CCNC: 72 U/L (ref 46–116)
ALT SERPL W P-5'-P-CCNC: 25 U/L (ref 12–78)
ANION GAP SERPL CALCULATED.3IONS-SCNC: 6 MMOL/L (ref 4–13)
AST SERPL W P-5'-P-CCNC: 18 U/L (ref 5–45)
BILIRUB SERPL-MCNC: 0.33 MG/DL (ref 0.2–1)
BUN SERPL-MCNC: 16 MG/DL (ref 5–25)
CALCIUM SERPL-MCNC: 9.9 MG/DL (ref 8.3–10.1)
CHLORIDE SERPL-SCNC: 104 MMOL/L (ref 100–108)
CHOLEST SERPL-MCNC: 134 MG/DL (ref 50–200)
CO2 SERPL-SCNC: 29 MMOL/L (ref 21–32)
CREAT SERPL-MCNC: 0.87 MG/DL (ref 0.6–1.3)
GFR SERPL CREATININE-BSD FRML MDRD: 91 ML/MIN/1.73SQ M
GLUCOSE P FAST SERPL-MCNC: 96 MG/DL (ref 65–99)
HCV AB SER QL: NORMAL
HDLC SERPL-MCNC: 44 MG/DL
LDLC SERPL CALC-MCNC: 76 MG/DL (ref 0–100)
POTASSIUM SERPL-SCNC: 4.1 MMOL/L (ref 3.5–5.3)
PROT SERPL-MCNC: 7.8 G/DL (ref 6.4–8.2)
SODIUM SERPL-SCNC: 139 MMOL/L (ref 136–145)
TRIGL SERPL-MCNC: 71 MG/DL

## 2021-08-03 PROCEDURE — G0103 PSA SCREENING: HCPCS

## 2021-08-03 PROCEDURE — 99204 OFFICE O/P NEW MOD 45 MIN: CPT | Performed by: SURGERY

## 2021-08-03 PROCEDURE — 86803 HEPATITIS C AB TEST: CPT

## 2021-08-03 PROCEDURE — 80061 LIPID PANEL: CPT

## 2021-08-03 PROCEDURE — 80053 COMPREHEN METABOLIC PANEL: CPT

## 2021-08-03 PROCEDURE — 36415 COLL VENOUS BLD VENIPUNCTURE: CPT

## 2021-08-03 RX ORDER — POLYETHYLENE GLYCOL 3350 17 G/17G
238 POWDER, FOR SOLUTION ORAL SEE ADMIN INSTRUCTIONS
Qty: 238 G | Refills: 0 | Status: SHIPPED | OUTPATIENT
Start: 2021-08-03 | End: 2021-08-18

## 2021-08-03 NOTE — PROGRESS NOTES
Assessment/Plan:  Discussed risks and benefits of colonoscopy including low risk of bleeding if polypectomy performed, abdominal discomfort/bloating and very small risk of colon perforation  Explained bowel prep in detail and gave instructions detailing appropriate pre-procedure diet and medication use  Prescription for bowel prep will be sent to the pharmacy  Procedure will be scheduled at earliest convenience  No problem-specific Assessment & Plan notes found for this encounter  Diagnoses and all orders for this visit:    Encounter for screening colonoscopy  -     Ambulatory referral to General Surgery  -     polyethylene glycol (GLYCOLAX) 17 GM/SCOOP powder; Take 238 g by mouth see administration instructions Mix 238 g with 64 oz of clear liquid  Drink half starting at noon on the day prior to colonoscopy  Drink remaining half 6 hours prior to procedure on day of colonoscopy  -     bisacodyl (DULCOLAX) 5 mg EC tablet; Take 1 tablet (5 mg total) by mouth see administration instructions Take 2 tablets at 12:00 p m  on day prior to colonoscopy  Take 2 tablets at 4:00 p m  on day prior to colonoscopy          Subjective:      Patient ID: Vidal Bonner is a 73 y o  male  He presents to discuss screening colonoscopy  He had one 5 years ago done in [facility] and thinks that he had some polyps removed  He denies any abdominal pain or rectal pain, no blood in his stool no change in bowel habits  A chart review was performed and previous primary care visit notes were reviewed  All applicable imaging studies were reviewed and images were reviewed personally  All applicable laboratory studies were reviewed personally  Care everywhere review was performed if  available and all pertinent notes were reviewed  The following portions of the patient's history were reviewed and updated as appropriate:   He  has a past medical history of Hypertension and Prediabetes    He   Patient Active and atraumatic  Eyes:      Extraocular Movements: Extraocular movements intact  Conjunctiva/sclera: Conjunctivae normal       Pupils: Pupils are equal, round, and reactive to light  Cardiovascular:      Rate and Rhythm: Normal rate and regular rhythm  Pulmonary:      Effort: Pulmonary effort is normal  No respiratory distress  Breath sounds: Normal breath sounds  Abdominal:      General: Abdomen is flat  There is no distension  Palpations: Abdomen is soft  Tenderness: There is no abdominal tenderness  Musculoskeletal:         General: No swelling or tenderness  Normal range of motion  Cervical back: Normal range of motion and neck supple  Skin:     General: Skin is dry  Neurological:      General: No focal deficit present  Mental Status: He is alert and oriented to person, place, and time  Psychiatric:         Mood and Affect: Mood normal          Behavior: Behavior normal          Thought Content:  Thought content normal          Judgment: Judgment normal

## 2021-08-05 LAB
PSA FREE MFR SERPL: 26.7 %
PSA FREE SERPL-MCNC: 0.4 NG/ML
PSA SERPL-MCNC: 1.5 NG/ML (ref 0–4)

## 2021-08-08 NOTE — ASSESSMENT & PLAN NOTE
Stable, improving   POCT HbA1c today showed A1c trending down from 6 1-->5 9  Continue weight control modalities   Adhere to low carb diet and exercise  Ensure adequate hydration daily

## 2021-08-08 NOTE — ASSESSMENT & PLAN NOTE
Stable and controlled  BP goal per JNC-8 criteria <150/90   Continue Lisinopril 40 mg HS  Will not restart Metoprolol 25 mg QD as it's causing him weight gain and fatigue and in addition BP is well controlled on one agent  Continue with daily exercise and healthy diet   Weight loss counseling  Microalbuminuria (February, 2021) will aim for tighter BP control with repeat in 1 year

## 2021-08-08 NOTE — PROGRESS NOTES
Assessment/Plan:    Essential hypertension  Stable and controlled  BP goal per JNC-8 criteria <150/90   Continue Lisinopril 40 mg HS  Will not restart Metoprolol 25 mg QD as it's causing him weight gain and fatigue and in addition BP is well controlled on one agent  Continue with daily exercise and healthy diet  Weight loss counseling  Microalbuminuria (February, 2021) will aim for tighter BP control with repeat in 1 year    Prediabetes  Stable, improving   POCT HbA1c today showed A1c trending down from 6 1-->5 9  Continue weight control modalities   Adhere to low carb diet and exercise  Ensure adequate hydration daily  Diagnoses and all orders for this visit:    Essential hypertension  -     lisinopril (ZESTRIL) 40 mg tablet; Take 1 tablet (40 mg total) by mouth daily at bedtime    Prediabetes  -     POCT hemoglobin A1c    Immunization due  -     PNEUMOCOCCAL POLYSACCHARIDE VACCINE 23-VALENT =>3YO SQ IM          Subjective:      Patient ID: Bijal Thomas is a 73 y o  male  HPI     Patient presents today to the office for HTN f/u visit and review of labs  Discussed lab results with patient, unremarkable  Mentions he is compliant with meds at home and takes them as prescribed  Adheres to low fat/carb diet and increased exercise regimen  Mentions he used to weigh 308 lbs and throughout 4-5 months he did lifestyle modifications and now he's weighing 276 lbs  He is doing portion control and avoid white flour products  No other immediate concerns at this time  Preventative Care  PPSV-23: today, pt agreeable to receive immunization  Colonoscopy: schedule for September 29, 2021  The following portions of the patient's history were reviewed and updated as appropriate: allergies, current medications, past family history, past medical history, past social history, past surgical history and problem list     Review of Systems   Constitutional: Negative for chills and fever     HENT: Negative for sore throat  Eyes: Negative for visual disturbance  Respiratory: Negative  Negative for cough and shortness of breath  Cardiovascular: Negative for chest pain and leg swelling  Gastrointestinal: Negative  Negative for abdominal pain, blood in stool, constipation, diarrhea, nausea and vomiting  Genitourinary: Negative  Negative for dysuria and hematuria  Skin: Negative for rash  Neurological: Negative for dizziness and headaches  Psychiatric/Behavioral: The patient is not nervous/anxious  Objective:      /84 (BP Location: Left arm, Patient Position: Sitting, Cuff Size: Adult)   Pulse 69   Temp (!) 97 2 °F (36 2 °C) (Temporal)   Resp 22   Ht 5' 9" (1 753 m)   Wt 126 kg (276 lb 11 2 oz)   SpO2 97%   BMI 40 86 kg/m²        Physical Exam  Vitals and nursing note reviewed  Constitutional:       General: He is not in acute distress  Appearance: Normal appearance  He is well-developed  He is obese  He is not ill-appearing, toxic-appearing or diaphoretic  HENT:      Head: Normocephalic and atraumatic  Nose: Nose normal       Mouth/Throat:      Mouth: Mucous membranes are moist    Eyes:      General:         Right eye: No discharge  Left eye: No discharge  Conjunctiva/sclera: Conjunctivae normal    Cardiovascular:      Rate and Rhythm: Normal rate and regular rhythm  Heart sounds: Normal heart sounds  Pulmonary:      Effort: Pulmonary effort is normal  No respiratory distress  Breath sounds: Normal breath sounds  Abdominal:      Palpations: Abdomen is soft  Tenderness: There is no abdominal tenderness  Musculoskeletal:         General: No tenderness  Normal range of motion  Cervical back: Normal range of motion and neck supple  Right lower leg: No edema  Left lower leg: No edema  Skin:     General: Skin is warm  Findings: No rash     Neurological:      Mental Status: He is alert and oriented to person, place, and time    Psychiatric:         Mood and Affect: Mood normal          Behavior: Behavior normal          Thought Content:  Thought content normal          Judgment: Judgment normal

## 2021-08-09 ENCOUNTER — OFFICE VISIT (OUTPATIENT)
Dept: FAMILY MEDICINE CLINIC | Facility: CLINIC | Age: 66
End: 2021-08-09

## 2021-08-09 VITALS
HEIGHT: 69 IN | WEIGHT: 276.7 LBS | DIASTOLIC BLOOD PRESSURE: 84 MMHG | OXYGEN SATURATION: 97 % | BODY MASS INDEX: 40.98 KG/M2 | RESPIRATION RATE: 22 BRPM | HEART RATE: 69 BPM | SYSTOLIC BLOOD PRESSURE: 132 MMHG | TEMPERATURE: 97.2 F

## 2021-08-09 DIAGNOSIS — Z23 IMMUNIZATION DUE: ICD-10-CM

## 2021-08-09 DIAGNOSIS — I10 ESSENTIAL HYPERTENSION: Primary | ICD-10-CM

## 2021-08-09 DIAGNOSIS — R73.03 PREDIABETES: ICD-10-CM

## 2021-08-09 LAB — SL AMB POCT HEMOGLOBIN AIC: 5.9 (ref ?–6.5)

## 2021-08-09 PROCEDURE — 90732 PPSV23 VACC 2 YRS+ SUBQ/IM: CPT | Performed by: INTERNAL MEDICINE

## 2021-08-09 PROCEDURE — 83036 HEMOGLOBIN GLYCOSYLATED A1C: CPT | Performed by: INTERNAL MEDICINE

## 2021-08-09 PROCEDURE — 3075F SYST BP GE 130 - 139MM HG: CPT | Performed by: INTERNAL MEDICINE

## 2021-08-09 PROCEDURE — G0009 ADMIN PNEUMOCOCCAL VACCINE: HCPCS | Performed by: INTERNAL MEDICINE

## 2021-08-09 PROCEDURE — 3079F DIAST BP 80-89 MM HG: CPT | Performed by: INTERNAL MEDICINE

## 2021-08-09 PROCEDURE — 99213 OFFICE O/P EST LOW 20 MIN: CPT | Performed by: INTERNAL MEDICINE

## 2021-08-09 RX ORDER — LISINOPRIL 40 MG/1
40 TABLET ORAL
Qty: 30 TABLET | Refills: 3
Start: 2021-08-09 | End: 2022-03-03 | Stop reason: SDUPTHER

## 2021-08-09 NOTE — PATIENT INSTRUCTIONS
La prediabetes   LO QUE NECESITA SABER:   La prediabetes es un nivel de glucosa (azúcar) en la daniel que es más alto de lo normal  No es lo suficientemente alto para ser considerado diabetes  La prediabetes aumenta el riesgo de diabetes tipo 2 y enfermedad del corazón  Ranlenard Finders [facility]:   Llame a lares médico si:  · Usted tiene más hambre o sed de lo usual     · Usted está orinando con más frecuencia de lo normal     · Siempre está exhausto  · Usted tiene visión borrosa  · Usted tiene preguntas o inquietudes acerca de lares condición o cuidado  Medicamentos:  · Medicamentos se pueden administrar si usted está en alto riesgo de diabetes tipo 2  También podrían administrarle medicamentos para bajar la presión arterial juliet y el colesterol alto  · [place] clover medicamentos giana se le haya indicado  Consulte con lares médico si usted miki que lares medicamento no le está ayudando o si presenta efectos secundarios  Infórmele si es alérgico a cualquier medicamento  Mantenga dion lista actualizada de los [place], las vitaminas y los productos herbales que dax  Incluya los siguientes datos de los medicamentos: cantidad, frecuencia y motivo de administración  Traiga con usted la lista o los envases de las píldoras a clover citas de seguimiento  Lleve la lista de los medicamentos con usted en stephany de dion emergencia  Prevenga o retrase la diabetes tipo 2: Las opciones saludables funcionan mejor para retrasar o prevenir la diabetes tipo 2  Puede disminuir el riesgo de padecer diabetes tipo 2 realizando lo siguiente:  · Realice actividad física regularmente  La actividad física, giana el ejercicio, puede ayudar a disminuir lares nivel de azúcar en la daniel  También puede ayudar a disminuir el riesgo de tener enfermedades cardíacas y ayudarle a perder peso  Los adultos deben hacer al menos 150 minutos (2 5 horas) de actividad física moderada cada semana   Reparta la cantidad de actividad mariella al [employer] 3 días a la semana  No deje de realizarla mariella más de 2 días seguidos  Los niños deben hacer al menos 60 minutos de actividad física moderada la mayoría de los días de la [place]  Ejemplos de actividad física moderada incluyen caminar a paso ligero, correr y nadar  No permanezca sentada por más de 30 minutos cada vez  Colabore con lares médico para crear un plan de [employer]  · Baje de peso si usted tiene sobrepeso  Dion pérdida de peso del 7% de lares peso corporal puede ayudar a reducir el nivel de azúcar en lares daniel  Lares médico puede indicarle cuál es el peso saludable para usted  Puede ayudarlo a crear un plan para perder peso  · Consuma alimentos saludables  Consuma dion variedad de frutas y vegetales  Consuma alimentos de grano integral con más frecuencia  Escoja productos lácteos, haile y otras proteínas que pili bajas en grasa  Consuma pocos dulces giana caramelos, galletas, sodas regulares y bebidas azucaradas  Usted también puede disminuir las calorías comiendo porciones pequeñas  Trabaje junto con lares médico o dietista para desarrollar un plan de alimentación adecuado para usted  · [employer] giana se le haya indicado  Lares médico puede indicarle medicamentos para la diabetes si usted está en alto riesgo de tener diabetes  También puede necesitar medicamentos para la hipertensión y para el colesterol alto  · Acuda a clover consultas de control con lares médico según le indicaron  Usted necesitará regresar cada año para que le realicen pruebas de diabetes  · No fume  Fumar podría aumentar el riesgo de diabetes tipo 2  La nicotina puede dañar los vasos sanguíneos  Puede tener otras afecciones, giana enfermedad pulmonar, si usted fuma  No use cigarrillos electrónicos o tabaco sin humo en vez de cigarrillos o para tratar de dejar de fumar  Todos estos aún contienen nicotina  Pida a lares médico información si usted fuma actualmente y Cool para dejar de hacerlo      Meron Christiana a la consulta de control con lares médico según las indicaciones: Usted necesitará regresar cada año para que le realicen pruebas de diabetes  Anote clover preguntas para que se acuerde de hacerlas mariella clover visitas  © Copyright [facility] [employer] Information is for End User's use only and may not be sold, redistributed or otherwise used for commercial purposes  All illustrations and images included in CareNotes® are the copyrighted property of [employer]  or [address] [place] [address] es sólo para uso en educación  Lares intención no es darle un consejo médico sobre enfermedades o tratamientos  Colsulte con lares Murlene Lusty farmacéutico antes de seguir cualquier régimen médico para saber si es seguro y efectivo para usted  Lifestyle Medicine Tip Sheet- Kinyarwanda    1  Coma alimentos predominantemente menos procesados, giana comida rápida, cenas de televisión y tocino  2  Coma cerca de la naturaleza (mercados de agricultores, productos frescos o congelados)    3  Coma dion dieta predominantemente basada en plantas  a  Verdes frondosos oscuros katherine   b  Frutas y vegetales  c   Granos integrales: maritza integral, apenas, bayas de maritza, quinua, jimmy cortada en cha, arroz integral, pasta integral   d  Legumbres: frijoles, frijoles pintos, frijoles blancos, frijoles negros, garbanzos (garbanzos), frijoles lima (maduros, secos), arvejas, lentejas y edamame (frijoles de soya verdes)      4  Al menos la mitad del plato debe contener frutas o verduras  5  El líquido debe ser predominantemente agua (limite el refresco y [place])    6  Yareli el tamaño de la porción  7  ¿Qué alimentos cornelius evitar o limitar? - Grasas: Específicamente saturadas y grasas trans  Se encuentran en margarinas, muchas comidas rápidas y algunos productos horneados comprados en la kendra  Las grasas saturadas y grasas trans pueden elevar lares nivel de colesterol y lares probabilidad de contraer enfermedades cardíacas    - Cuando cocine, es mejor no usar aceites, kelin si es necesario, trate de limitar la cantidad de aceite Delfina, ya que el aceite contiene muchas calorías por volumen y es muy poco saludable cuando se calienta mariella la cocción   - Azúcar: limite o evite el azúcar, los dulces y los granos refinados  Los granos refinados se encuentran en el pan reinoso, el arroz reinoso, la mayoría de las formas de pasta y la mayoría de los alimentos "aperitivos" envasados  - Intente no cocinar con ranjeet y evite agregar ranjeet adicional a clover comidas  - Wanda Nicole: los estudios hyatt demostrado que comer mucha minnie Jenkins riesgo de ciertos problemas de [place], giana enfermedades cardíacas y cáncer  8  7-9 horas de sueño    9  Ejercicio diario mínimo de 30 minutos (caminando alrededor de la tylor)    10  Socialización (amigos y familiares)    - Explora tu vecindario  Ve al parque, pasa tiempo en la biblioteca  Si está interesado, puede leer más sobre las opciones de alimentos saludables en los siguientes sitios web:  a  Nutritionfacts  org  b  Home cooking recipes: https://www [email or web address] [email or web address]  c  http://tanvir info/  d  Familydoctor  org

## 2021-08-11 ENCOUNTER — TELEPHONE (OUTPATIENT)
Dept: SURGERY | Facility: CLINIC | Age: 66
End: 2021-08-11

## 2021-08-18 ENCOUNTER — OFFICE VISIT (OUTPATIENT)
Dept: FAMILY MEDICINE CLINIC | Facility: CLINIC | Age: 66
End: 2021-08-18

## 2021-08-18 VITALS
TEMPERATURE: 98 F | HEART RATE: 72 BPM | RESPIRATION RATE: 16 BRPM | OXYGEN SATURATION: 97 % | WEIGHT: 282 LBS | SYSTOLIC BLOOD PRESSURE: 170 MMHG | HEIGHT: 69 IN | BODY MASS INDEX: 41.77 KG/M2 | DIASTOLIC BLOOD PRESSURE: 70 MMHG

## 2021-08-18 DIAGNOSIS — R07.89 OTHER CHEST PAIN: Primary | ICD-10-CM

## 2021-08-18 PROCEDURE — 99213 OFFICE O/P EST LOW 20 MIN: CPT | Performed by: FAMILY MEDICINE

## 2021-08-19 PROCEDURE — 93000 ELECTROCARDIOGRAM COMPLETE: CPT | Performed by: FAMILY MEDICINE

## 2021-09-08 ENCOUNTER — TELEPHONE (OUTPATIENT)
Dept: PREADMISSION TESTING | Facility: HOSPITAL | Age: 66
End: 2021-09-08

## 2021-09-28 ENCOUNTER — TELEPHONE (OUTPATIENT)
Dept: GASTROENTEROLOGY | Facility: HOSPITAL | Age: 66
End: 2021-09-28

## 2021-12-09 ENCOUNTER — OFFICE VISIT (OUTPATIENT)
Dept: FAMILY MEDICINE CLINIC | Facility: CLINIC | Age: 66
End: 2021-12-09

## 2021-12-09 VITALS
BODY MASS INDEX: 42.23 KG/M2 | DIASTOLIC BLOOD PRESSURE: 84 MMHG | OXYGEN SATURATION: 97 % | TEMPERATURE: 97.8 F | SYSTOLIC BLOOD PRESSURE: 196 MMHG | RESPIRATION RATE: 18 BRPM | WEIGHT: 286 LBS | HEART RATE: 71 BPM

## 2021-12-09 DIAGNOSIS — I10 ESSENTIAL HYPERTENSION: Primary | ICD-10-CM

## 2021-12-09 DIAGNOSIS — K92.1 BLOOD IN STOOL: ICD-10-CM

## 2021-12-09 PROCEDURE — 99213 OFFICE O/P EST LOW 20 MIN: CPT | Performed by: FAMILY MEDICINE

## 2021-12-09 RX ORDER — CHLORTHALIDONE 25 MG/1
25 TABLET ORAL DAILY
Qty: 30 TABLET | Refills: 2 | Status: SHIPPED | OUTPATIENT
Start: 2021-12-09 | End: 2022-03-01

## 2021-12-15 ENCOUNTER — APPOINTMENT (OUTPATIENT)
Dept: LAB | Facility: CLINIC | Age: 66
End: 2021-12-15
Payer: COMMERCIAL

## 2021-12-15 DIAGNOSIS — K92.1 BLOOD IN STOOL: ICD-10-CM

## 2021-12-15 LAB
ANION GAP SERPL CALCULATED.3IONS-SCNC: 2 MMOL/L (ref 4–13)
BASOPHILS # BLD AUTO: 0.1 THOUSANDS/ΜL (ref 0–0.1)
BASOPHILS NFR BLD AUTO: 1 % (ref 0–1)
BUN SERPL-MCNC: 18 MG/DL (ref 5–25)
CALCIUM SERPL-MCNC: 10.1 MG/DL (ref 8.3–10.1)
CHLORIDE SERPL-SCNC: 106 MMOL/L (ref 100–108)
CO2 SERPL-SCNC: 31 MMOL/L (ref 21–32)
CREAT SERPL-MCNC: 0.88 MG/DL (ref 0.6–1.3)
EOSINOPHIL # BLD AUTO: 0.28 THOUSAND/ΜL (ref 0–0.61)
EOSINOPHIL NFR BLD AUTO: 2 % (ref 0–6)
ERYTHROCYTE [DISTWIDTH] IN BLOOD BY AUTOMATED COUNT: 15.6 % (ref 11.6–15.1)
GFR SERPL CREATININE-BSD FRML MDRD: 90 ML/MIN/1.73SQ M
GLUCOSE P FAST SERPL-MCNC: 103 MG/DL (ref 65–99)
HCT VFR BLD AUTO: 47.7 % (ref 36.5–49.3)
HGB BLD-MCNC: 14.7 G/DL (ref 12–17)
IMM GRANULOCYTES # BLD AUTO: 0.12 THOUSAND/UL (ref 0–0.2)
IMM GRANULOCYTES NFR BLD AUTO: 1 % (ref 0–2)
LYMPHOCYTES # BLD AUTO: 3.76 THOUSANDS/ΜL (ref 0.6–4.47)
LYMPHOCYTES NFR BLD AUTO: 32 % (ref 14–44)
MCH RBC QN AUTO: 26.8 PG (ref 26.8–34.3)
MCHC RBC AUTO-ENTMCNC: 30.8 G/DL (ref 31.4–37.4)
MCV RBC AUTO: 87 FL (ref 82–98)
MONOCYTES # BLD AUTO: 0.89 THOUSAND/ΜL (ref 0.17–1.22)
MONOCYTES NFR BLD AUTO: 8 % (ref 4–12)
NEUTROPHILS # BLD AUTO: 6.67 THOUSANDS/ΜL (ref 1.85–7.62)
NEUTS SEG NFR BLD AUTO: 56 % (ref 43–75)
NRBC BLD AUTO-RTO: 0 /100 WBCS
PLATELET # BLD AUTO: 297 THOUSANDS/UL (ref 149–390)
PMV BLD AUTO: 10.7 FL (ref 8.9–12.7)
POTASSIUM SERPL-SCNC: 4.1 MMOL/L (ref 3.5–5.3)
RBC # BLD AUTO: 5.48 MILLION/UL (ref 3.88–5.62)
SODIUM SERPL-SCNC: 139 MMOL/L (ref 136–145)
WBC # BLD AUTO: 11.82 THOUSAND/UL (ref 4.31–10.16)

## 2021-12-15 PROCEDURE — 80048 BASIC METABOLIC PNL TOTAL CA: CPT

## 2021-12-15 PROCEDURE — 85025 COMPLETE CBC W/AUTO DIFF WBC: CPT

## 2021-12-15 PROCEDURE — 36415 COLL VENOUS BLD VENIPUNCTURE: CPT

## 2022-02-28 DIAGNOSIS — I10 ESSENTIAL HYPERTENSION: ICD-10-CM

## 2022-03-01 RX ORDER — CHLORTHALIDONE 25 MG/1
TABLET ORAL
Qty: 30 TABLET | Refills: 1 | Status: SHIPPED | OUTPATIENT
Start: 2022-03-01 | End: 2022-03-03 | Stop reason: SDUPTHER

## 2022-03-02 NOTE — PROGRESS NOTES
Assessment/Plan:    Essential hypertension  Asymptomatic, BP not at goal   BP goal per JNC-8 criteria <150/90   Continue Lisinopril 40 mg HS and Chlorthalidone 25 mg Qam  Meds refilled   Continue with daily exercise and low salt diet  Will obtain new urine microalb/urine cr ratio and CMP to ensure labs are stable given uncontrolled HTN    Class 3 severe obesity due to excess calories with body mass index (BMI) of 40 0 to 44 9 in adult ([facility] )  BMI Counseling: Body mass index is 44 18 kg/m²  The BMI is above normal  Nutrition recommendations include reducing portion sizes, decreasing overall calorie intake, 3-5 servings of fruits/vegetables daily, reducing fast food intake, consuming healthier snacks, decreasing soda and/or juice intake, moderation in carbohydrate intake, increasing intake of lean protein, reducing intake of saturated fat and trans fat and reducing intake of cholesterol  Exercise recommendations include exercising 3-5 times per week  Prediabetes  Stable, improving   A1c 5 9 (08/09/21)  Will check new A1c to ensure it remains stable   Continue weight control modalities   Adhere to low carb diet and exercise  Ensure adequate hydration daily  If A1c is over 6 then will consider initiating Metformin       Diagnoses and all orders for this visit:    Welcome to Medicare preventive visit  Comments:  Labs, sreenings and immunization reviewed  He will have colonoscopy consult on 03/09  Healthy lifestyle, weight counseling     Essential hypertension  -     Discontinue: lisinopril (ZESTRIL) 40 mg tablet; Take 1 tablet (40 mg total) by mouth daily at bedtime  -     chlorthalidone 25 mg tablet; Take 1 tablet (25 mg total) by mouth daily  -     Cancel: Microalbumin / creatinine urine ratio  -     Comprehensive metabolic panel; Future  -     Microalbumin / creatinine urine ratio  -     lisinopril (ZESTRIL) 40 mg tablet;  Take 1 tablet (40 mg total) by mouth daily at bedtime    Screening for abdominal aortic aneurysm  -     US abdominal aorta screening aaa; Future    Right testicular pain  Comments:  Acute on chronic pain  Ddx: varicocele, epididymitis, hydrocele, STIs  US testicle & scrotum  STIs screening and check new PSA  Urology referral if indicated   Orders:  -     US scrotum and testicles; Future  -     PSA, total and free; Future  -     Cancel: Chlamydia/GC amplified DNA by PCR; Future  -     RPR (MONITOR) W/REFL TITER (REFL); Future  -     Hepatitis C antibody; Future  -     HIV 1/2 Antigen/Antibody (4th Generation) w Reflex [facility]; Future  -     CBC and differential; Future  -     Hepatitis panel, acute; Future  -     Chlamydia/GC amplified DNA by PCR    Screening for prostate cancer  -     PSA, total and free; Future    Prediabetes  -     HEMOGLOBIN A1C W/ EAG ESTIMATION; Future    Class 3 severe obesity due to excess calories without serious comorbidity with body mass index (BMI) of 40 0 to 44 9 in adult [facility])          Subjective:      Patient ID: Ayala Simmons is a 78 y o  male  HPI    Patient presents for Medicare Annual Wellness visit  Patient states he was upset right before the visit that he received car ticket  He reports taking Chlorthalidone 25 mg in the morning  He ran out of Lisinopril a few days before  Requests medication refills  Denies any acute CV events  Right testicular pain  Waxing and waning pain  Onset: reports in his 25s, then 30s, and now recurred  He was sent in the past for US testicles however was not completed  Noted no edema, or erythema around scrotum, testicles  Discomfort with sexual intercourse, feels pain inside  Denies fevers, chills  Sexual hx: patient is sexually active with 2 women partners: his wife, and girlfriend  He has been sexually active with his girlfriend for 1 year  Mentions he uses condoms with both partners   No hx of STIs in the past      The following portions of the patient's history were reviewed and updated as appropriate: allergies, current medications, past family history, past medical history, past social history, past surgical history and problem list     Review of Systems   Constitutional: Negative for chills and fever  HENT: Negative for sore throat  Eyes: Negative for visual disturbance  Respiratory: Negative  Negative for cough and shortness of breath  Cardiovascular: Negative for chest pain and leg swelling  Gastrointestinal: Negative  Negative for abdominal pain, blood in stool, constipation, diarrhea, nausea and vomiting  Genitourinary: Positive for testicular pain (R side worse)  Negative for dysuria, hematuria, penile discharge, penile swelling and scrotal swelling  Musculoskeletal: Negative for back pain and gait problem  Skin: Negative for rash  Neurological: Negative for dizziness and headaches  Psychiatric/Behavioral: The patient is not nervous/anxious  Objective:    BP (!) 190/80 (BP Location: Left arm, Patient Position: Sitting, Cuff Size: Adult)   Pulse 73   Temp 97 5 °F (36 4 °C) (Temporal)   Resp 20   Ht 5' 9" (1 753 m)   Wt 136 kg (299 lb 3 2 oz)   SpO2 97%   BMI 44 18 kg/m²      Physical Exam  Vitals and nursing note reviewed  Constitutional:       General: He is not in acute distress  Appearance: Normal appearance  He is well-developed  He is obese  He is not ill-appearing, toxic-appearing or diaphoretic  HENT:      Head: Normocephalic and atraumatic  Nose: Nose normal    Eyes:      General:         Right eye: No discharge  Left eye: No discharge  Extraocular Movements: Extraocular movements intact  Conjunctiva/sclera: Conjunctivae normal    Cardiovascular:      Rate and Rhythm: Normal rate and regular rhythm  Heart sounds: Normal heart sounds  Pulmonary:      Effort: Pulmonary effort is normal  No respiratory distress  Breath sounds: Normal breath sounds  Abdominal:      Palpations: Abdomen is soft  Tenderness:  There is no abdominal tenderness  Genitourinary:     Penis: Normal        Comments: R testicle "bag of worms", more edematous than left side  No underlying erythema, rash, or signs of infections noted  Musculoskeletal:         General: Normal range of motion  Cervical back: Normal range of motion and neck supple  Right lower leg: No edema  Left lower leg: No edema  Skin:     General: Skin is warm  Findings: No rash  Neurological:      Mental Status: He is alert and oriented to person, place, and time  Psychiatric:         Mood and Affect: Mood normal          Behavior: Behavior normal          Thought Content:  Thought content normal          Judgment: Judgment normal

## 2022-03-02 NOTE — ASSESSMENT & PLAN NOTE
Asymptomatic, BP not at goal   BP goal per JNC-8 criteria <150/90   Continue Lisinopril 40 mg HS and Chlorthalidone 25 mg Qam  Meds refilled   Continue with daily exercise and low salt diet  Will obtain new urine microalb/urine cr ratio and CMP to ensure labs are stable given uncontrolled HTN

## 2022-03-03 ENCOUNTER — OFFICE VISIT (OUTPATIENT)
Dept: FAMILY MEDICINE CLINIC | Facility: CLINIC | Age: 67
End: 2022-03-03

## 2022-03-03 VITALS
HEIGHT: 69 IN | TEMPERATURE: 97.5 F | SYSTOLIC BLOOD PRESSURE: 190 MMHG | RESPIRATION RATE: 20 BRPM | BODY MASS INDEX: 44.31 KG/M2 | HEART RATE: 73 BPM | DIASTOLIC BLOOD PRESSURE: 80 MMHG | WEIGHT: 299.2 LBS | OXYGEN SATURATION: 97 %

## 2022-03-03 DIAGNOSIS — Z12.5 SCREENING FOR PROSTATE CANCER: ICD-10-CM

## 2022-03-03 DIAGNOSIS — E66.01 CLASS 3 SEVERE OBESITY DUE TO EXCESS CALORIES WITHOUT SERIOUS COMORBIDITY WITH BODY MASS INDEX (BMI) OF 40.0 TO 44.9 IN ADULT (HCC): ICD-10-CM

## 2022-03-03 DIAGNOSIS — R73.03 PREDIABETES: ICD-10-CM

## 2022-03-03 DIAGNOSIS — N50.811 RIGHT TESTICULAR PAIN: ICD-10-CM

## 2022-03-03 DIAGNOSIS — I10 ESSENTIAL HYPERTENSION: ICD-10-CM

## 2022-03-03 DIAGNOSIS — Z13.6 SCREENING FOR ABDOMINAL AORTIC ANEURYSM: ICD-10-CM

## 2022-03-03 DIAGNOSIS — Z00.00 WELCOME TO MEDICARE PREVENTIVE VISIT: Primary | ICD-10-CM

## 2022-03-03 LAB
CREAT UR-MCNC: 106 MG/DL
MICROALBUMIN UR-MCNC: 50.9 MG/L (ref 0–20)
MICROALBUMIN/CREAT 24H UR: 48 MG/G CREATININE (ref 0–30)

## 2022-03-03 PROCEDURE — G0402 INITIAL PREVENTIVE EXAM: HCPCS | Performed by: FAMILY MEDICINE

## 2022-03-03 PROCEDURE — 82570 ASSAY OF URINE CREATININE: CPT | Performed by: FAMILY MEDICINE

## 2022-03-03 PROCEDURE — 87491 CHLMYD TRACH DNA AMP PROBE: CPT | Performed by: FAMILY MEDICINE

## 2022-03-03 PROCEDURE — 87591 N.GONORRHOEAE DNA AMP PROB: CPT | Performed by: FAMILY MEDICINE

## 2022-03-03 PROCEDURE — 99214 OFFICE O/P EST MOD 30 MIN: CPT | Performed by: FAMILY MEDICINE

## 2022-03-03 PROCEDURE — 82043 UR ALBUMIN QUANTITATIVE: CPT | Performed by: FAMILY MEDICINE

## 2022-03-03 RX ORDER — LISINOPRIL 40 MG/1
40 TABLET ORAL
Qty: 30 TABLET | Refills: 3
Start: 2022-03-03 | End: 2022-03-03

## 2022-03-03 RX ORDER — CHLORTHALIDONE 25 MG/1
25 TABLET ORAL DAILY
Qty: 30 TABLET | Refills: 3 | Status: SHIPPED | OUTPATIENT
Start: 2022-03-03 | End: 2022-04-14 | Stop reason: SDUPTHER

## 2022-03-03 RX ORDER — LISINOPRIL 40 MG/1
40 TABLET ORAL
Qty: 30 TABLET | Refills: 3 | Status: SHIPPED | OUTPATIENT
Start: 2022-03-03 | End: 2022-06-14

## 2022-03-03 NOTE — PATIENT INSTRUCTIONS
Prevención de caídas   LO QUE NECESITA SABER:   La prevención de caídas incluye formas de hacer más seguro lares hogar y [place]  También incluye cómo moverse más cuidadosamente para evitar dion caída  Las condiciones médicas que provocan cambios en la presión arterial, la visión o la fuerza muscular y la coordinación pueden llegar a aumentar lares riesgo de caídas  [place] pueden aumentar lares riesgo de caídas si le provocan mareos, debilidad o somnolencia  INSTRUCCIONES SOBRE [facility]:   Llame al 911 o pídale a alguien más que lo maria del rosario si:  · Se ha caído y está inconsciente  · Se ha caído y no puede  parte de lares cuerpo  Comuníquese con lares médico si:  · Se ha caído y tiene dolor en el cuerpo o dolor de [place]  · Usted tiene preguntas o inquietudes acerca de lares condición o cuidado  Recomendaciones para prevenir caídas:  · Póngase de pie o siéntese despacio  [place] puede ayudarlo a mantener lares equilibrio y prevenir dion caída  · Use dispositivos de apoyo giana se le indique  Lares médico le puede recomendar que use un bastón o un caminador para que lo asista en lares equilibrio  Es posible que necesite que le instalen barandas en el baño cerca al inodoro o en la ducha  · Use calzado que le quede marcelo y tenga suelas antideslizantes  Use zapatos dentro y fuera de casa  Utilice pantunflas con dion suela de buen agarre  No use zapatos con tacones altos  · Utilizar un dispositivo de [employer]  [place]loise es un dispositivo que puede llevar puesto y le permite llamar al 761 en ste[place] que se Dorice Oral y necesite ayuda  Pídale a lares médico más información  · Manténgase activo  El ejercicio puede ayudar a fortalecer los músculos y mejorar lares equilibrio  Lares médico le puede recomendar hacer ejercicios aeróbicos acuáticos o caminar  También le puede recomendar la fisioterapia para mejorar lares coordinación   Nunca comience un programa de ejercicios sin consultarlo dasha con lares médico  · Controle clover afecciones médicas  [place] con todas las citas con clover médicos  Visite al [employer] se le ha indicado  Recomendaciones para la seguridad en el hogar:      · Agregue elementos para prevenir caídas en el baño  Coloque tiras antideslizantes en el piso de la ducha o de la bañera para evitar resbalones  Use dion alfombra de baño si no tiene alfombra en el cuarto de baño  [place] evitará que se caiga cuando sale de la ducha o la bañera  Use un asiento de ducha de modo que no necesitará ponerse de pie [place] se [place]  [place] en el inodoro o en dion silla en el cuarto de baño para secarse y vestirse  [place] impedirá que pierda el equilibrio [place] se seca o se viste estando de pie  · Mantener los pasillos despejados  Despeje las vías y las escaleras por donde camina retirando los libros, los zapatos u otros objetos  Coloque los cables del teléfono y las lámparas fuera de lares suzanne para que no tenga que caminar Loco Freud  Si no puede moverlos, sujételos con cinta adhesiva  Retire los tapetes pequeños  Si no puede quitar un tapete, sujételo con cinta de doble loree  Lo cual evitará que se tropiece con éstos  · Instale dion buena iluminación en lares hogar  Use lamparillas de noche para ayudar a iluminar los pasillos al baño o a la cocina  Siempre encienda la jacquelin antes de empezar a caminar  · Mantenga los objetos que [place] con frecuencia en estantes dentro de lares alcance  No use un banquito para intentar alcanzar un elemento  · Pinte o coloque cinta reflectiva en los bordes de las escaleras  Lo cual puede ayudarle a radhika mejor las escaleras  Acuda a la consulta de control con lares médico según las indicaciones: Anote clover preguntas para que se acuerde de hacerlas mariella clover visitas  © Copyright [facility] 2022 Information is for End User's use only and may not be sold, redistributed or otherwise used for commercial purposes   All illustrations and images included in CareNotes® are the copyrighted property of A D A M , Inc  or [address] información es sólo para uso en educación  Lares intención no es darle un consejo médico sobre enfermedades o tratamientos  Colsulte con lares Maegan Lesser farmacéutico antes de seguir cualquier régimen médico para saber si es seguro y efectivo para usted  Medicare Preventive Visit Patient Instructions  Thank you for completing your Welcome to Medicare Visit or Medicare Annual Wellness Visit today  Your next wellness visit will be due in one year (3/4/2023)  The screening/preventive services that you may require over the next 5-10 years are detailed below  Some tests may not apply to you based off risk factors and/or age  Screening tests ordered at today's visit but not completed yet may show as past due  Also, please note that scanned in results may not display below  Preventive Screenings:  Service Recommendations Previous Testing/Comments   Colorectal Cancer Screening  · Colonoscopy    · Fecal Occult Blood Test (FOBT)/Fecal Immunochemical Test (FIT)  · Fecal DNA/Cologuard Test  · Flexible Sigmoidoscopy Age: 54-65 years old   Colonoscopy: every 10 years (May be performed more frequently if at higher risk)  OR  FOBT/FIT: every 1 year  OR  Cologuard: every 3 years  OR  Sigmoidoscopy: every 5 years  Screening may be recommended earlier than age 48 if at higher risk for colorectal cancer  Also, an individualized decision between you and your healthcare provider will decide whether screening between the ages of 74-80 would be appropriate   Colonoscopy: Not on file  FOBT/FIT: Not on file  Cologuard: Not on file  Sigmoidoscopy: Not on file          Prostate Cancer Screening Individualized decision between patient and health care provider in men between ages of 53-78   Medicare will cover every 12 months beginning on the day after your 50th birthday PSA: 1 5 ng/mL     Screening Current     Hepatitis C Screening Once for adults born between [birth year] and 1965  More frequently in patients at high risk for Hepatitis C Hep C Antibody: 08/03/2021    Screening Current   Diabetes Screening 1-2 times per year if you're at risk for diabetes or have pre-diabetes Fasting glucose: 103 mg/dL   A1C: 5 9    Screening Current   Cholesterol Screening Once every 5 years if you don't have a lipid disorder  May order more often based on risk factors  Lipid panel: 08/03/2021    Screening Current      Other Preventive Screenings Covered by Medicare:  1  Abdominal Aortic Aneurysm (AAA) Screening: covered once if your at risk  You're considered to be at risk if you have a family history of AAA or a male between the age of 73-68 who smoking at least 100 cigarettes in your lifetime  2  Lung Cancer Screening: covers low dose CT scan once per year if you meet all of the following conditions: (1) Age 50-69; (2) No signs or symptoms of lung cancer; (3) Current smoker or have quit smoking within the last 15 years; (4) You have a tobacco smoking history of at least 30 pack years (packs per day x number of years you smoked); (5) You get a written order from a healthcare provider  3  Glaucoma Screening: covered annually if you're considered high risk: (1) You have diabetes OR (2) Family history of glaucoma OR (3)  aged 48 and older OR (3)  American aged 72 and older  3  Osteoporosis Screening: covered every 2 years if you meet one of the following conditions: (1) Have a vertebral abnormality; (2) On glucocorticoid therapy for more than 3 months; (3) Have primary hyperparathyroidism; (4) On osteoporosis medications and need to assess response to drug therapy  5  HIV Screening: covered annually if you're between the age of 12-76  Also covered annually if you are younger than 13 and older than 72 with risk factors for HIV infection  For pregnant patients, it is covered up to 3 times per pregnancy      Immunizations:  Immunization Recommendations   Influenza Vaccine Annual influenza vaccination during flu season is recommended for all persons aged >= 6 months who do not have contraindications   Pneumococcal Vaccine (Prevnar and Pneumovax)  * Prevnar = PCV13  * Pneumovax = PPSV23 Adults 25-60 years old: 1-3 doses may be recommended based on certain risk factors  Adults 72 years old: Prevnar (PCV13) vaccine recommended followed by Pneumovax (PPSV23) vaccine  If already received PPSV23 since turning 65, then PCV13 recommended at least one year after PPSV23 dose  Hepatitis B Vaccine 3 dose series if at intermediate or high risk (ex: diabetes, end stage renal disease, liver disease)   Tetanus (Td) Vaccine - COST NOT COVERED BY MEDICARE PART B Following completion of primary series, a booster dose should be given every 10 years to maintain immunity against tetanus  Td may also be given as tetanus wound prophylaxis  Tdap Vaccine - COST NOT COVERED BY MEDICARE PART B Recommended at least once for all adults  For pregnant patients, recommended with each pregnancy  Shingles Vaccine (Shingrix) - COST NOT COVERED BY MEDICARE PART B  2 shot series recommended in those aged 48 and above     Health Maintenance Due:      Topic Date Due    Colorectal Cancer Screening  Never done    Hepatitis C Screening  Completed     Immunizations Due:      Topic Date Due    Influenza Vaccine (1) 09/01/2021    COVID-19 Vaccine (3 - Booster for Moderna series) 09/13/2021     Advance Directives   What are advance directives? Advance directives are legal documents that state your wishes and plans for medical care  These plans are made ahead of time in case you lose your ability to make decisions for yourself  Advance directives can apply to any medical decision, such as the treatments you want, and if you want to donate organs  What are the types of advance directives? There are many types of advance directives, and each state has rules about how to use them   You may choose a combination of any of the following:  · Living will: This is a written record of the treatment you want  You can also choose which treatments you do not want, which to limit, and which to stop at a certain time  This includes surgery, medicine, IV fluid, and tube feedings  · Durable power of  for healthcare [facility]): This is a written record that states who you want to make healthcare choices for you when you are unable to make them for yourself  This person, called a proxy, is usually a family member or a friend  You may choose more than 1 proxy  · Do not resuscitate (DNR) order:  A DNR order is used in case your heart stops beating or you stop breathing  It is a request not to have certain forms of treatment, such as CPR  A DNR order may be included in other types of advance directives  · Medical directive: This covers the care that you want if you are in a coma, near death, or unable to make decisions for yourself  You can list the treatments you want for each condition  Treatment may include pain medicine, surgery, blood transfusions, dialysis, IV or tube feedings, and a ventilator (breathing machine)  · Values history: This document has questions about your views, beliefs, and how you feel and think about life  This information can help others choose the care that you would choose  Why are advance directives important? An advance directive helps you control your care  Although spoken wishes may be used, it is better to have your wishes written down  Spoken wishes can be misunderstood, or not followed  Treatments may be given even if you do not want them  An advance directive may make it easier for your family to make difficult choices about your care  Weight Management   Why it is important to manage your weight:  Being overweight increases your risk of health conditions such as heart disease, high blood pressure, type 2 diabetes, and certain types of cancer   It can also increase your risk for osteoarthritis, sleep apnea, and other respiratory problems  Aim for a slow, steady weight loss  Even a small amount of weight loss can lower your risk of health problems  How to lose weight safely:  A safe and healthy way to lose weight is to eat fewer calories and get regular exercise  You can lose up about 1 pound a week by decreasing the number of calories you eat by 500 calories each day  Healthy meal plan for weight management:  A healthy meal plan includes a variety of foods, contains fewer calories, and helps you stay healthy  A healthy meal plan includes the following:  · Eat whole-grain foods more often  A healthy meal plan should contain fiber  Fiber is the part of grains, fruits, and vegetables that is not broken down by your body  Whole-grain foods are healthy and provide extra fiber in your diet  Some examples of whole-grain foods are whole-wheat breads and pastas, oatmeal, brown rice, and bulgur  · Eat a variety of vegetables every day  Include dark, leafy greens such as spinach, kale, luciano greens, and mustard greens  Eat yellow and orange vegetables such as carrots, sweet potatoes, and winter squash  · Eat a variety of fruits every day  Choose fresh or canned fruit (canned in its own juice or light syrup) instead of juice  Fruit juice has very little or no fiber  · Eat low-fat dairy foods  Drink fat-free (skim) milk or 1% milk  Eat fat-free yogurt and low-fat cottage cheese  Try low-fat cheeses such as mozzarella and other reduced-fat cheeses  · Choose meat and other protein foods that are low in fat  Choose beans or other legumes such as split peas or lentils  Choose fish, skinless poultry (chicken or turkey), or lean cuts of red meat (beef or pork)  Before you cook meat or poultry, cut off any visible fat  · Use less fat and oil  Try baking foods instead of frying them  Add less fat, such as margarine, sour cream, regular salad dressing and mayonnaise to foods  Eat fewer high-fat foods   Some examples of high-fat foods include french fries, doughnuts, ice cream, and cakes  · Eat fewer sweets  Limit foods and drinks that are high in sugar  This includes candy, cookies, regular soda, and sweetened drinks  Exercise:  Exercise at least 30 minutes per day on most days of the week  Some examples of exercise include walking, biking, dancing, and swimming  You can also fit in more physical activity by taking the stairs instead of the elevator or parking farther away from stores  Ask your healthcare provider about the best exercise plan for you  © Copyright [facility] 2018 Information is for End User's use only and may not be sold, redistributed or otherwise used for commercial purposes   All illustrations and images included in CareNotes® are the copyrighted property of A D A M , Inc  or [address] [place], [place] [address]ashley farhana

## 2022-03-03 NOTE — PROGRESS NOTES
Assessment and Plan:     Problem List Items Addressed This Visit        Cardiovascular and Mediastinum    Essential hypertension     Asymptomatic, BP not at goal   BP goal per JNC-8 criteria <150/90   Continue Lisinopril 40 mg HS and Chlorthalidone 25 mg Qam  Meds refilled   Continue with daily exercise and low salt diet  Will obtain new urine microalb/urine cr ratio and CMP to ensure labs are stable given uncontrolled HTN         Relevant Medications    chlorthalidone 25 mg tablet    lisinopril (ZESTRIL) 40 mg tablet    Other Relevant Orders    Comprehensive metabolic panel    Microalbumin / creatinine urine ratio (Completed)       Other    Class 3 severe obesity due to excess calories with body mass index (BMI) of 40 0 to 44 9 in adult ([facility]     BMI Counseling: Body mass index is 44 18 kg/m²  The BMI is above normal  Nutrition recommendations include reducing portion sizes, decreasing overall calorie intake, 3-5 servings of fruits/vegetables daily, reducing fast food intake, consuming healthier snacks, decreasing soda and/or juice intake, moderation in carbohydrate intake, increasing intake of lean protein, reducing intake of saturated fat and trans fat and reducing intake of cholesterol  Exercise recommendations include exercising 3-5 times per week  Prediabetes     Stable, improving   A1c 5 9 (08/09/21)  Will check new A1c to ensure it remains stable   Continue weight control modalities   Adhere to low carb diet and exercise  Ensure adequate hydration daily     If A1c is over 6 then will consider initiating Metformin         Relevant Orders    HEMOGLOBIN A1C W/ EAG ESTIMATION      Other Visit Diagnoses     Welcome to Medicare preventive visit    -  Primary    Labs, sreenings and immunization reviewed  He will have colonoscopy consult on 03/09  Healthy lifestyle, weight counseling     Screening for abdominal aortic aneurysm        Relevant Orders    US abdominal aorta screening aaa    Right testicular pain        Acute on chronic pain  Ddx: varicocele, epididymitis, hydrocele, STIs  US testicle & scrotum  STIs screening and check new PSA  Urology referral if indicated     Relevant Orders    US scrotum and testicles    PSA, total and free    RPR (MONITOR) W/REFL TITER (REFL)    Hepatitis C antibody    HIV 1/2 Antigen/Antibody (4th Generation) w Reflex [facility]    CBC and differential    Hepatitis panel, acute    Chlamydia/GC amplified DNA by PCR (Completed)    Screening for prostate cancer        Relevant Orders    PSA, total and free        BMI Counseling: Body mass index is 44 18 kg/m²  The BMI is above normal  Nutrition recommendations include decreasing portion sizes, encouraging healthy choices of fruits and vegetables, decreasing fast food intake, consuming healthier snacks, limiting drinks that contain sugar, moderation in carbohydrate intake, increasing intake of lean protein, reducing intake of saturated and trans fat and reducing intake of cholesterol  Exercise recommendations include exercising 3-5 times per week  No pharmacotherapy was ordered  Rationale for BMI follow-up plan is due to patient being overweight or obese  Depression Screening and Follow-up Plan: Patient was screened for depression during today's encounter  They screened negative with a PHQ-2 score of 0  Falls Plan of Care: balance, strength, and gait training instructions were provided  Preventive health issues were discussed with patient, and age appropriate screening tests were ordered as noted in patient's After Visit Summary  Personalized health advice and appropriate referrals for health education or preventive services given if needed, as noted in patient's After Visit Summary  History of Present Illness:     Patient presents for Welcome to Medicare visit  Patient states on March 9th--> colonoscopy consult       Patient Care Team:  Romy Lua MD as PCP - General (Family Medicine)  Mily Conti as PCP - PCP-[facility] ([facility])     Review of Systems:     Review of Systems   Constitutional: Negative for chills and fever  HENT: Negative for sore throat  Eyes: Negative for visual disturbance  Respiratory: Negative  Negative for cough and shortness of breath  Cardiovascular: Negative for chest pain and leg swelling  Gastrointestinal: Negative  Negative for abdominal pain, blood in stool, constipation, diarrhea, nausea and vomiting  Genitourinary: Positive for testicular pain (Right side worse)  Negative for dysuria and hematuria  Skin: Negative for rash  Neurological: Negative for dizziness and headaches  Psychiatric/Behavioral: The patient is not nervous/anxious  Problem List:     Patient Active Problem List   Diagnosis    Hematospermia    Essential hypertension    Class 3 severe obesity due to excess calories with body mass index (BMI) of 40 0 to 44 9 in adult [facility])    Prediabetes    Encounter for screening colonoscopy    Eczema    Low HDL (under 40)      Past Medical and Surgical History:     Past Medical History:   Diagnosis Date    Hypertension     Prediabetes      History reviewed  No pertinent surgical history     Family History:     Family History   Problem Relation Age of Onset    Hypertension Mother     Coronary artery disease Mother     Hypertension Father     Coronary artery disease Father     Hypertension Sister     Coronary artery disease Sister     Hypertension Brother     Coronary artery disease Brother     Hypertension Paternal Uncle     Stroke Paternal Uncle       Social History:     Social History     Socioeconomic History    Marital status: /Civil Union     Spouse name: None    Number of children: None    Years of education: None    Highest education level: None   Occupational History    None   Tobacco Use    Smoking status: Never Smoker    Smokeless tobacco: Never Used    Tobacco comment: only tried one cigarette when teenager Substance and Sexual Activity    Alcohol use: Never    Drug use: Never    Sexual activity: Not Currently   Other Topics Concern    None   Social History Narrative    None     Social Determinants of Health     Financial Resource Strain: Low Risk     Difficulty of Paying Living Expenses: Not hard at all   Food Insecurity: No Food Insecurity    Worried About Running Out of Food in the Last Year: Never true    Jose Miguel of Food in the Last Year: Never true   Transportation Needs: No Transportation Needs    Lack of Transportation (Medical): No    Lack of Transportation (Non-Medical): No   Physical Activity: Not on file   Stress: Not on file   Social Connections: Not on file   Intimate Partner Violence: Not on file   Housing Stability: Not on file      Medications and Allergies:     Current Outpatient Medications   Medication Sig Dispense Refill    chlorthalidone 25 mg tablet Take 1 tablet (25 mg total) by mouth daily 30 tablet 3    lisinopril (ZESTRIL) 40 mg tablet Take 1 tablet (40 mg total) by mouth daily at bedtime 30 tablet 3     No current facility-administered medications for this visit  Allergies   Allergen Reactions    Amlodipine Other (See Comments)     Nightmares and difficulty sleeping      Immunizations:     Immunization History   Administered Date(s) Administered    COVID-19 MODERNA VACC 0 5 ML IM 03/16/2021, 04/13/2021    Influenza, recombinant, quadrivalent,injectable, preservative free 02/05/2021    Pneumococcal Polysaccharide PPV23 08/09/2021    Tdap 05/29/2018      Health Maintenance:         Topic Date Due    Colorectal Cancer Screening  Never done    Hepatitis C Screening  Completed         Topic Date Due    Influenza Vaccine (1) 09/01/2021    COVID-19 Vaccine (3 - Booster for Moderna series) 09/13/2021      Medicare Screening Tests and Risk Assessments:     Luna Swain is here for his Welcome to Medicare visit  Health Risk Assessment:   Patient rates overall health as good  Patient feels that their physical health rating is much better  Patient is satisfied with their life  Eyesight was rated as same  Hearing was rated as same  Patient feels that their emotional and mental health rating is same  Patients states they are never, rarely angry  Patient states they are often unusually tired/fatigued  Pain experienced in the last 7 days has been none  Patient states that he has experienced no weight loss or gain in last 6 months  Depression Screening:   PHQ-2 Score: 0      Fall Risk Screening: In the past year, patient has experienced: no history of falling in past year      Home Safety:  Patient does not have trouble with stairs inside or outside of their home  Patient has working smoke alarms and has working carbon monoxide detector  Home safety hazards include: none  Nutrition:   Current diet is No Added Salt and Regular  Medications:   Patient is not currently taking any over-the-counter supplements  Patient is able to manage medications  Activities of Daily Living (ADLs)/Instrumental Activities of Daily Living (IADLs):   Walk and transfer into and out of bed and chair?: Yes  Dress and groom yourself?: Yes    Bathe or shower yourself?: Yes    Feed yourself? Yes  Do your laundry/housekeeping?: Yes  Manage your money, pay your bills and track your expenses?: Yes  Make your own meals?: Yes    Do your own shopping?: Yes    Previous Hospitalizations:   Any hospitalizations or ED visits within the last 12 months?: No      Advance Care Planning:   Living will: Yes    Durable POA for healthcare:  Yes    Advanced directive: Yes    Five wishes given: Yes      Cognitive Screening:   Provider or family/friend/caregiver concerned regarding cognition?: No    PREVENTIVE SCREENINGS      Cardiovascular Screening:    General: Screening Current      Diabetes Screening:     General: Screening Current      Colorectal Cancer Screening:     General: Risks and Benefits Discussed and Screening Current    Due for: Colonoscopy - Low Risk      Prostate Cancer Screening:    General: Screening Current      Osteoporosis Screening:    General: Screening Not Indicated      Abdominal Aortic Aneurysm (AAA) Screening:    Risk factors include: age between 73-69 yo and tobacco use      Due for: Screening AAA Ultrasound      Lung Cancer Screening:     General: Screening Not Indicated      Hepatitis C Screening:    General: Screening Current    Screening, Brief Intervention, and Referral to Treatment (SBIRT)    Screening  Typical number of drinks in a day: 0  Typical number of drinks in a week: 0  Interpretation: Low risk drinking behavior  Single Item Drug Screening:  How often have you used an illegal drug (including marijuana) or a prescription medication for non-medical reasons in the past year? never    Single Item Drug Screen Score: 0  Interpretation: Negative screen for possible drug use disorder    Other Counseling Topics:   Car/seat belt/driving safety, skin self-exam, sunscreen and calcium and vitamin D intake and regular weightbearing exercise  No exam data present     Physical Exam:     BP (!) 190/80 (BP Location: Left arm, Patient Position: Sitting, Cuff Size: Adult)   Pulse 73   Temp 97 5 °F (36 4 °C) (Temporal)   Resp 20   Ht 5' 9" (1 753 m)   Wt 136 kg (299 lb 3 2 oz)   SpO2 97%   BMI 44 18 kg/m²     Physical Exam  Vitals and nursing note reviewed  Constitutional:       General: He is not in acute distress  Appearance: Normal appearance  He is well-developed  He is obese  He is not ill-appearing, toxic-appearing or diaphoretic  HENT:      Head: Normocephalic and atraumatic  Nose: Nose normal    Eyes:      General:         Right eye: No discharge  Left eye: No discharge  Extraocular Movements: Extraocular movements intact  Conjunctiva/sclera: Conjunctivae normal    Cardiovascular:      Rate and Rhythm: Normal rate and regular rhythm  Heart sounds:  No murmur heard  Pulmonary:      Effort: Pulmonary effort is normal  No respiratory distress  Breath sounds: Normal breath sounds  Abdominal:      Palpations: Abdomen is soft  Tenderness: There is no abdominal tenderness  Genitourinary:     Penis: Normal        Comments: R testicle "bag of worms", more edematous than left side  No underlying erythema, rash, or signs of infections noted  Musculoskeletal:         General: Normal range of motion  Cervical back: Neck supple  Right lower leg: No edema  Skin:     General: Skin is warm  Neurological:      Mental Status: He is alert and oriented to person, place, and time  Mental status is at baseline  Psychiatric:         Mood and Affect: Mood normal          Behavior: Behavior normal          Thought Content:  Thought content normal          Judgment: Judgment normal           Lizett Payton MD

## 2022-03-04 ENCOUNTER — APPOINTMENT (OUTPATIENT)
Dept: LAB | Facility: CLINIC | Age: 67
End: 2022-03-04
Payer: COMMERCIAL

## 2022-03-04 ENCOUNTER — TELEPHONE (OUTPATIENT)
Dept: FAMILY MEDICINE CLINIC | Facility: CLINIC | Age: 67
End: 2022-03-04

## 2022-03-04 DIAGNOSIS — N50.811 RIGHT TESTICULAR PAIN: ICD-10-CM

## 2022-03-04 DIAGNOSIS — I10 ESSENTIAL HYPERTENSION: ICD-10-CM

## 2022-03-04 DIAGNOSIS — Z12.5 SCREENING FOR PROSTATE CANCER: ICD-10-CM

## 2022-03-04 DIAGNOSIS — R73.03 PREDIABETES: ICD-10-CM

## 2022-03-04 LAB
ALBUMIN SERPL BCP-MCNC: 3.7 G/DL (ref 3.5–5)
ALP SERPL-CCNC: 69 U/L (ref 46–116)
ALT SERPL W P-5'-P-CCNC: 27 U/L (ref 12–78)
ANION GAP SERPL CALCULATED.3IONS-SCNC: 3 MMOL/L (ref 4–13)
AST SERPL W P-5'-P-CCNC: 16 U/L (ref 5–45)
BASOPHILS # BLD AUTO: 0.07 THOUSANDS/ΜL (ref 0–0.1)
BASOPHILS NFR BLD AUTO: 1 % (ref 0–1)
BILIRUB SERPL-MCNC: 0.38 MG/DL (ref 0.2–1)
BUN SERPL-MCNC: 18 MG/DL (ref 5–25)
C TRACH DNA SPEC QL NAA+PROBE: NEGATIVE
CALCIUM SERPL-MCNC: 10.2 MG/DL (ref 8.3–10.1)
CHLORIDE SERPL-SCNC: 105 MMOL/L (ref 100–108)
CO2 SERPL-SCNC: 29 MMOL/L (ref 21–32)
CREAT SERPL-MCNC: 0.89 MG/DL (ref 0.6–1.3)
EOSINOPHIL # BLD AUTO: 0.24 THOUSAND/ΜL (ref 0–0.61)
EOSINOPHIL NFR BLD AUTO: 2 % (ref 0–6)
ERYTHROCYTE [DISTWIDTH] IN BLOOD BY AUTOMATED COUNT: 15.7 % (ref 11.6–15.1)
EST. AVERAGE GLUCOSE BLD GHB EST-MCNC: 128 MG/DL
GFR SERPL CREATININE-BSD FRML MDRD: 89 ML/MIN/1.73SQ M
GLUCOSE P FAST SERPL-MCNC: 112 MG/DL (ref 65–99)
HAV IGM SER QL: NORMAL
HBA1C MFR BLD: 6.1 %
HBV CORE IGM SER QL: NORMAL
HBV SURFACE AG SER QL: NORMAL
HCT VFR BLD AUTO: 47.4 % (ref 36.5–49.3)
HCV AB SER QL: NORMAL
HGB BLD-MCNC: 14.8 G/DL (ref 12–17)
IMM GRANULOCYTES # BLD AUTO: 0.13 THOUSAND/UL (ref 0–0.2)
IMM GRANULOCYTES NFR BLD AUTO: 1 % (ref 0–2)
LYMPHOCYTES # BLD AUTO: 3.44 THOUSANDS/ΜL (ref 0.6–4.47)
LYMPHOCYTES NFR BLD AUTO: 30 % (ref 14–44)
MCH RBC QN AUTO: 26.4 PG (ref 26.8–34.3)
MCHC RBC AUTO-ENTMCNC: 31.2 G/DL (ref 31.4–37.4)
MCV RBC AUTO: 85 FL (ref 82–98)
MONOCYTES # BLD AUTO: 0.86 THOUSAND/ΜL (ref 0.17–1.22)
MONOCYTES NFR BLD AUTO: 7 % (ref 4–12)
N GONORRHOEA DNA SPEC QL NAA+PROBE: NEGATIVE
NEUTROPHILS # BLD AUTO: 6.89 THOUSANDS/ΜL (ref 1.85–7.62)
NEUTS SEG NFR BLD AUTO: 59 % (ref 43–75)
NRBC BLD AUTO-RTO: 0 /100 WBCS
PLATELET # BLD AUTO: 315 THOUSANDS/UL (ref 149–390)
PMV BLD AUTO: 10.7 FL (ref 8.9–12.7)
POTASSIUM SERPL-SCNC: 4.1 MMOL/L (ref 3.5–5.3)
PROT SERPL-MCNC: 8 G/DL (ref 6.4–8.2)
RBC # BLD AUTO: 5.6 MILLION/UL (ref 3.88–5.62)
SODIUM SERPL-SCNC: 137 MMOL/L (ref 136–145)
WBC # BLD AUTO: 11.63 THOUSAND/UL (ref 4.31–10.16)

## 2022-03-04 PROCEDURE — 84153 ASSAY OF PSA TOTAL: CPT

## 2022-03-04 PROCEDURE — 85025 COMPLETE CBC W/AUTO DIFF WBC: CPT

## 2022-03-04 PROCEDURE — 86592 SYPHILIS TEST NON-TREP QUAL: CPT

## 2022-03-04 PROCEDURE — 80074 ACUTE HEPATITIS PANEL: CPT

## 2022-03-04 PROCEDURE — 87389 HIV-1 AG W/HIV-1&-2 AB AG IA: CPT

## 2022-03-04 PROCEDURE — 36415 COLL VENOUS BLD VENIPUNCTURE: CPT

## 2022-03-04 PROCEDURE — 83036 HEMOGLOBIN GLYCOSYLATED A1C: CPT

## 2022-03-04 PROCEDURE — 84154 ASSAY OF PSA FREE: CPT

## 2022-03-04 PROCEDURE — 80053 COMPREHEN METABOLIC PANEL: CPT

## 2022-03-04 NOTE — TELEPHONE ENCOUNTER
----- Message from Armida Orosco MD sent at 3/4/2022  7:07 AM EST -----  Regarding: Paperwork to   Good morning,    Please call patient and let him know I've completed his paperwork and placed the envelope in the "forms completed for patient" bin so he can stop by the office today and pick it up  Please call patient and let him know      Thank you,  Dr Ana Edouard

## 2022-03-04 NOTE — TELEPHONE ENCOUNTER
Pt has been informed of form for [facility] adoption & foster care  Resource parent medical report has been completed and scanned in pt's chart   Form has been placed in  bin

## 2022-03-04 NOTE — ASSESSMENT & PLAN NOTE
Stable, improving   A1c 5 9 (08/09/21)  Will check new A1c to ensure it remains stable   Continue weight control modalities   Adhere to low carb diet and exercise  Ensure adequate hydration daily     If A1c is over 6 then will consider initiating Metformin

## 2022-03-04 NOTE — ASSESSMENT & PLAN NOTE
BMI Counseling: Body mass index is 44 18 kg/m²  The BMI is above normal  Nutrition recommendations include reducing portion sizes, decreasing overall calorie intake, 3-5 servings of fruits/vegetables daily, reducing fast food intake, consuming healthier snacks, decreasing soda and/or juice intake, moderation in carbohydrate intake, increasing intake of lean protein, reducing intake of saturated fat and trans fat and reducing intake of cholesterol  Exercise recommendations include exercising 3-5 times per week

## 2022-03-06 LAB — HIV 1+2 AB+HIV1 P24 AG SERPL QL IA: NORMAL

## 2022-03-07 LAB
PSA FREE MFR SERPL: 25 %
PSA FREE SERPL-MCNC: 0.2 NG/ML
PSA SERPL-MCNC: 0.8 NG/ML (ref 0–4)
RPR SER QL: NORMAL

## 2022-03-09 ENCOUNTER — TELEPHONE (OUTPATIENT)
Dept: FAMILY MEDICINE CLINIC | Facility: CLINIC | Age: 67
End: 2022-03-09

## 2022-03-09 NOTE — TELEPHONE ENCOUNTER
----- Message from Sena Molina MD sent at 3/8/2022  5:55 PM EST -----  Regarding: US aorta and scrotum  Good afternoon clinical team,    Can you please call patient and give him the central scheduling line to schedule US aorta and scrotum/testicles       Thank you,  Dr Jessica Bryant

## 2022-03-11 DIAGNOSIS — E83.52 HYPERCALCEMIA: Primary | ICD-10-CM

## 2022-03-17 ENCOUNTER — HOSPITAL ENCOUNTER (OUTPATIENT)
Dept: ULTRASOUND IMAGING | Facility: HOSPITAL | Age: 67
Discharge: HOME | End: 2022-03-17
Payer: COMMERCIAL

## 2022-03-17 ENCOUNTER — CLINICAL SUPPORT (OUTPATIENT)
Dept: FAMILY MEDICINE CLINIC | Facility: CLINIC | Age: 67
End: 2022-03-17

## 2022-03-17 ENCOUNTER — TELEPHONE (OUTPATIENT)
Dept: FAMILY MEDICINE CLINIC | Facility: CLINIC | Age: 67
End: 2022-03-17

## 2022-03-17 VITALS — DIASTOLIC BLOOD PRESSURE: 90 MMHG | SYSTOLIC BLOOD PRESSURE: 198 MMHG

## 2022-03-17 DIAGNOSIS — Z13.6 SCREENING FOR ABDOMINAL AORTIC ANEURYSM: ICD-10-CM

## 2022-03-17 DIAGNOSIS — I10 ESSENTIAL HYPERTENSION: Primary | ICD-10-CM

## 2022-03-17 DIAGNOSIS — N50.811 RIGHT TESTICULAR PAIN: ICD-10-CM

## 2022-03-17 DIAGNOSIS — I10 RESISTANT HYPERTENSION: Primary | ICD-10-CM

## 2022-03-17 PROCEDURE — 76706 US ABDL AORTA SCREEN AAA: CPT

## 2022-03-17 PROCEDURE — 76870 US EXAM SCROTUM: CPT

## 2022-03-17 RX ORDER — NADOLOL 40 MG/1
40 TABLET ORAL DAILY
Qty: 30 TABLET | Refills: 1 | Status: SHIPPED | OUTPATIENT
Start: 2022-03-17 | End: 2022-03-31

## 2022-03-17 NOTE — TELEPHONE ENCOUNTER
Patient came for BP check with nurse  His BP recorded at 212/90 and after waiting 15 min went down to 198/90  Patient is asymptomatic, denies ha, visual changes, dizziness, CP, SOB, GI sx, le edema  Will start him on 3rd HTN medication, Nadolol 40 mg daily in addition to Chlorthalidone 25 mg Am and Lisinopril 40 mg HS  He cannot tolerate Amlodipine due to SE  Given resistant HTN, will schedule him for renal artery US to r/o BASHIR  Will also schedule patient for BP recheck in 2 weeks with nurse  Pt verbalized understanding

## 2022-03-18 ENCOUNTER — TELEPHONE (OUTPATIENT)
Dept: FAMILY MEDICINE CLINIC | Facility: CLINIC | Age: 67
End: 2022-03-18

## 2022-03-18 NOTE — TELEPHONE ENCOUNTER
----- Message from Arcelia Alvarado MD sent at 3/17/2022 10:22 AM EDT -----  Regarding: Needs to be scheduled for BASHIR US  Clinical team,    Can you please schedule patient for renal artery stenosis US  Halina placed the order today  He's been having resistant Htn and needed to be started on 3rd HTN medication today       Thank you,  Dr Lisha Ortega

## 2022-03-18 NOTE — TELEPHONE ENCOUNTER
Forwarding message to [facility] to assist with scheduling   Thank you juanjose

## 2022-03-25 DIAGNOSIS — N43.3 HYDROCELE IN ADULT: Primary | ICD-10-CM

## 2022-03-25 DIAGNOSIS — N50.3 EPIDIDYMAL CYST: ICD-10-CM

## 2022-03-31 ENCOUNTER — CLINICAL SUPPORT (OUTPATIENT)
Dept: FAMILY MEDICINE CLINIC | Facility: CLINIC | Age: 67
End: 2022-03-31

## 2022-03-31 ENCOUNTER — TELEPHONE (OUTPATIENT)
Dept: FAMILY MEDICINE CLINIC | Facility: CLINIC | Age: 67
End: 2022-03-31

## 2022-03-31 VITALS — OXYGEN SATURATION: 99 % | HEART RATE: 75 BPM | DIASTOLIC BLOOD PRESSURE: 92 MMHG | SYSTOLIC BLOOD PRESSURE: 180 MMHG

## 2022-03-31 DIAGNOSIS — I10 RESISTANT HYPERTENSION: ICD-10-CM

## 2022-03-31 DIAGNOSIS — E66.01 CLASS 3 SEVERE OBESITY DUE TO EXCESS CALORIES WITHOUT SERIOUS COMORBIDITY WITH BODY MASS INDEX (BMI) OF 40.0 TO 44.9 IN ADULT (HCC): Primary | ICD-10-CM

## 2022-03-31 RX ORDER — NADOLOL 80 MG/1
80 TABLET ORAL DAILY
Qty: 30 TABLET | Refills: 1 | Status: SHIPPED | OUTPATIENT
Start: 2022-03-31 | End: 2022-05-24

## 2022-03-31 NOTE — TELEPHONE ENCOUNTER
Patient came today for BP recheck nurse visit  Initially his BP was 202/94, asymptomatic  Patient mentions he had taken all three medications this morning: Nadolol 40 mg daily in addition to Chlorthalidone 25 mg Am and Lisinopril 40 mg HS  Pt mentions he checks BP at home this morning and shows me pictures (BP in the 165/70, and 157/80 range)  After 15 min of having patient rest, Bp was repeated and registered at 180/92  Pt continues to deny ha, vision changes, cp, sob, gi sx, and le edema  Plan:  Resistant Htn  Bp goal per JNC-8 criteria <150/90  Increase Nadalol to 80 mg daily (HR 76 today)  Continue Lisinopril 40 mg and Chlorthalidone 25 mg daily  Given recent wt gain, I've recommended he looses wt, referral to wt management placed  Investigation of 2nd causes of HTN such as BASHIR initiated   Pt given central scheduling phone number to schedule renal US to r/o BASHIR  Patient will see me in the office in 2 weeks for new BP recheck   He may need ALEK r/o as well and referral to sleep medicine

## 2022-04-04 NOTE — TELEPHONE ENCOUNTER
Does not appear the pt has been scheduled yet      Please contact him and give him the # to central scheduling to schedule this appt [phone]

## 2022-04-13 NOTE — ASSESSMENT & PLAN NOTE
Bp goal per JNC-8 criteria <150/90, not at goal, asymptomatic  Continue Lisinopril 40 mg, and Nadalol 80 mg  Increase Chlorthalidone from 25-->50 mg daily  Given recent wt gain, I've recommended he looses wt, referral to wt management placed  Investigation of 2nd causes of HTN such as BASHIR initiated   Pt given central scheduling phone number and reinforced to schedule renal US to r/o BASHIR  Will check labs for 2nd causes of HTN as well: TSH, CMP, aldosterone/renin ratio, 24 hrs urine metanephrines, PTH and lipid panel, cortisol suppression test   Referral to sleep medicine to r/o ALEK  F/u in 2 weeks with nurse for BP recheck and with me in 4 weeks

## 2022-04-13 NOTE — PROGRESS NOTES
Assessment/Plan:    Essential hypertension  Bp goal per JNC-8 criteria <150/90, not at goal, asymptomatic  Continue Lisinopril 40 mg, and Nadalol 80 mg  Increase Chlorthalidone from 25-->50 mg daily  Given recent wt gain, I've recommended he looses wt, referral to wt management placed  Investigation of 2nd causes of HTN such as BASHIR initiated  Pt given central scheduling phone number and reinforced to schedule renal US to r/o BASHIR  Will check labs for 2nd causes of HTN as well: TSH, CMP, aldosterone/renin ratio, 24 hrs urine metanephrines, PTH and lipid panel, cortisol suppression test   Referral to sleep medicine to r/o ALEK  F/u in 2 weeks with nurse for BP recheck and with me in 4 weeks    Prediabetes  A1c 6 1 (03/04/22)  Continue weight control modalities, referral to wt management placed   Discussion to initiate Metformin given high risk of progression to diabetes however patient is reluctant to start this medication  Adhere to low carb diet and exercise  Ensure adequate hydration daily  Diagnoses and all orders for this visit:    Resistant hypertension  -     TSH, 3rd generation with Free T4 reflex; Future  -     Aldosterone/Renin Ratio; Future  -     Comprehensive metabolic panel; Future  -     Ambulatory Referral to Sleep Medicine; Future  -     Lipid Panel with Direct LDL reflex; Future  -     chlorthalidone (HYGROTEN) 50 MG tablet; Take 1 tablet (50 mg total) by mouth daily  -     Cortisol Suppression Test; Future  -     PTH, intact; Future  -     Metanephrines Fractionated, urine, 24 hour; Future    Prediabetes    Screening for hyperlipidemia  -     Lipid Panel with Direct LDL reflex; Future          Subjective:      Patient ID: Anitha Unger is a 78 y o  male  HPI    Patient presents today to the office for HTN f/u visit  Endorses compliance with his BP medications as prescribed  He does check BP at home (yesterday: 160/81, 2 days ago 159/78)   Pt mentions he forgot to schedule renal US appt  Pt denies any CV sx today  He does note that at times he has L sided "neck vein tense and painful"  The following portions of the patient's history were reviewed and updated as appropriate: allergies, current medications, past family history, past medical history, past social history, past surgical history and problem list     Review of Systems   Constitutional: Negative for chills and fever  HENT: Negative for sore throat  Hx of snoring   Eyes: Negative for visual disturbance  Respiratory: Negative  Negative for cough and shortness of breath  Cardiovascular: Negative for chest pain, palpitations and leg swelling  Gastrointestinal: Negative  Negative for abdominal pain, blood in stool, constipation, diarrhea, nausea and vomiting  Endocrine: Negative  Genitourinary: Negative for dysuria and hematuria  Musculoskeletal: Positive for arthralgias (neck ms tense)  Skin: Negative for rash  Neurological: Negative for dizziness and headaches  Psychiatric/Behavioral: Positive for sleep disturbance  The patient is not nervous/anxious  Objective:    BP (!) 184/86 (BP Location: Right arm, Patient Position: Sitting, Cuff Size: Standard)   Pulse 73   Temp 97 8 °F (36 6 °C) (Temporal)   Resp 18   Ht 5' 9" (1 753 m)   Wt 134 kg (295 lb 14 4 oz)   SpO2 98%   BMI 43 70 kg/m²   Repeat BP: 152/74     Physical Exam  Vitals and nursing note reviewed  Constitutional:       General: He is not in acute distress  Appearance: Normal appearance  He is well-developed  He is obese  He is not ill-appearing, toxic-appearing or diaphoretic  HENT:      Head: Normocephalic and atraumatic  Nose: Nose normal    Eyes:      General:         Right eye: No discharge  Left eye: No discharge  Extraocular Movements: Extraocular movements intact  Conjunctiva/sclera: Conjunctivae normal    Cardiovascular:      Rate and Rhythm: Normal rate and regular rhythm        Heart sounds: Normal heart sounds  Pulmonary:      Effort: Pulmonary effort is normal  No respiratory distress  Breath sounds: Normal breath sounds  Abdominal:      Palpations: Abdomen is soft  Tenderness: There is no abdominal tenderness  Musculoskeletal:         General: No tenderness  Normal range of motion  Cervical back: Normal range of motion and neck supple  No tenderness (neck ms examination normal, not tense)  Right lower leg: No edema  Left lower leg: No edema  Skin:     General: Skin is warm  Findings: No rash  Neurological:      Mental Status: He is alert and oriented to person, place, and time  Psychiatric:         Mood and Affect: Mood normal          Behavior: Behavior normal          Thought Content:  Thought content normal          Judgment: Judgment normal

## 2022-04-13 NOTE — ASSESSMENT & PLAN NOTE
A1c 6 1 (03/04/22)  Continue weight control modalities, referral to wt management placed   Discussion to initiate Metformin given high risk of progression to diabetes however patient is reluctant to start this medication  Adhere to low carb diet and exercise  Ensure adequate hydration daily

## 2022-04-14 ENCOUNTER — OFFICE VISIT (OUTPATIENT)
Dept: FAMILY MEDICINE CLINIC | Facility: CLINIC | Age: 67
End: 2022-04-14

## 2022-04-14 VITALS
RESPIRATION RATE: 18 BRPM | DIASTOLIC BLOOD PRESSURE: 86 MMHG | OXYGEN SATURATION: 98 % | HEART RATE: 73 BPM | BODY MASS INDEX: 43.83 KG/M2 | HEIGHT: 69 IN | TEMPERATURE: 97.8 F | SYSTOLIC BLOOD PRESSURE: 184 MMHG | WEIGHT: 295.9 LBS

## 2022-04-14 DIAGNOSIS — I10 RESISTANT HYPERTENSION: Primary | ICD-10-CM

## 2022-04-14 DIAGNOSIS — Z13.220 SCREENING FOR HYPERLIPIDEMIA: ICD-10-CM

## 2022-04-14 DIAGNOSIS — R73.03 PREDIABETES: ICD-10-CM

## 2022-04-14 PROCEDURE — 99213 OFFICE O/P EST LOW 20 MIN: CPT | Performed by: FAMILY MEDICINE

## 2022-04-14 RX ORDER — CHLORTHALIDONE 50 MG/1
50 TABLET ORAL DAILY
Qty: 30 TABLET | Refills: 2 | Status: SHIPPED | OUTPATIENT
Start: 2022-04-14 | End: 2022-07-11

## 2022-04-14 RX ORDER — HYDRALAZINE HYDROCHLORIDE 25 MG/1
25 TABLET, FILM COATED ORAL 3 TIMES DAILY
Qty: 90 TABLET | Refills: 0 | Status: CANCELLED | OUTPATIENT
Start: 2022-04-14

## 2022-04-14 NOTE — PATIENT INSTRUCTIONS
Nadolol 80 mg dion tableta cada tina  Lisinopril 40 mg dion tableta cada tina  Chlorthalidone 50 mg dion tableta cada tina empezando manana      Plan de alimentación con "enfoque dietético para detener la hipertensión (DASH, por clover siglas en inglés)   LO QUE NECESITA SABER:   El plan de alimentación DASH está diseñado para ayudar a prevenir o disminuir la hipertensión  También puede ayudar a bajar el colesterol samantha (colesterol LDL) y disminuir lares riesgo de enfermedad cardíaca  El plan es bajo en sodio, azúcar, grasas dañinas, y grasas en lares totalidad  Es alto en potasio, calcio, magnesio y [place]  Estos nutrientes se agregan al consumir más frutas, vegetales y granos enteros  Con el plan de alimentación DASH, usted necesita consumir un número específico de porciones de cada sydnie de alimentos  [place] le ayudará a consumir las cantidades suficientes de ciertos nutrientes y limitar otros  La cantidad de porciones que usted debe comer depende de la cantidad de calorías que usted necesita  Lares dietista puede ayudarlo a crear planes de comidas con la cantidad [place] de porciones para cada sydnie de alimentos  [facility]STRUCCIONES [facility]BRE [facility]:   Lo que necesita saber acerca del sodio: Lares dietista le indicará la cantidad de sodio que usted debe consumir a diario  La gente que tiene la presión arterial linh debe consumir de 1,500 a 2,300 mg de sodio al día giana kandis  Dion cucharadita (cdta) de sal tiene 2,300 mg de sodio  [place] puede parecer giana dion meta difícil, kelin pequeños cambios en los alimentos que usted consume pueden hacer dion gran diferencia  Lares médico o dietista puede ayudarlo a crear un plan alimenticio que cumpla lares límite de sodio  · Olivia las etiquetas de los alimentos  Las etiquetas pueden ayudarle a escoger alimentos bajos en sodio  La cantidad de sodio está incluida en miligramos (mg)   La columna del porcentaje de valor diario indica la cantidad de necesidades diarias satisfechas con 1 porción del alimento para cada nutriente en la lista  Escoja alimentos que tengan menos de 5% del porcentaje diario de [place]  Estos alimentos se consideran bajos en sodio  Los alimentos que tienen 20% o más del porcentaje diario de sodio se consideran alimentos altos en sodio  Evite alimentos que tengan más de 300 mg de sodio por porción  Escoja alimentos Nery chung que son bajos en sodio, con sodio reducido, o sin sal agregada  · Limite la ranjeet agregada  No sale la comida en la butt si se añade sal al cocinar  Use hierbas y condimentos, giana cebollas, ajo y especias sin sal para agregar sabor  Use jugo de lima, ulisses o vinagre para agregar un sabor ácido  Use chiles picantes o dion cantidad pequeña de salsa picante para agregar un sabor picante  Limite los alimentos con alto contenido de sal agregada, giana los siguientes:    ? Condimentos hechos con sal, giana sal de ajo, sal de apio, sal de cebolla, sal condimentada, suavizantes para haile, y glutamato de monosodio (MSG, por clover siglas en inglés)    ? Sopa Miso y mezclas para sopa enlatadas o secas    ? Salsa de soya regular, salsa de [facility], salsa [place], salsa para bistec, [address], y [address]  vinagres con sabor    ? Alimentos para merendar, giana yvette tostadas, palomitas de [place], pretzels, piel de cerdo, galletas de soda saladas, y nueces saladas    ? Alimentos congelados, giana cenas, entradas, vegetales en salsa, y haile empanizadas    · [employer] sustitutos para la sal  Pregúntele a lares médico si es posible usar sustitutos de la sal  Algunos sustitutos de la sal vienen con ingredientes que pueden ser dañinos si usted tiene ciertos padecimientos médicos  · Escoja los alimentos cuidadosamente cuando sale a comer a restaurantes: las comidas de los restaurantes, sobre todo restaurantes de comida rápida, vic siempre son altas en sodio   Algunos restaurantes ofrecen información nutricional que indica la cantidad de sodio en clover alimentos  Pida que preparen clover comidas con menos sal o sin sal     Lo que necesita saber acerca de las grasas: Las grasas insaturadas y los ácidos grasos omega-3 son ejemplos de grasas saludables  Las grasas no saludables incluyen las grasas saturadas y las grasas trans  · Incluya grasas saludables, giana las siguientes:     ? Aceites de cocina, giana el de soja, canola, [place] o [place]    ? Pescados grasos, giana el salmón, el atún, la caballa o las jennifer    ? Aceite de linaza o linaza molida    ? ½ taza de frijoles cocidos, giana frijoles negros, frijoles rojos o frijoles pintos    ? 1½ onzas de gavin secos bajos en sodio, giana almendras o nueces    ? Mantequilla de maní baja en azúcar y sodio    ? [place], giana las de chía o [place]       · Limite o no consuma grasas poco saludables, giana los siguientes:     ? Alimentos que contienen grasa de origen animal, giana las haile grasas, la [place], la [place] y la crema    ? Limmie [place] en jalen, aceite de anderson y aceite de laine     ? Aderezo de ensalada completo o cremoso    ? Sopa cremosa    ? [place] Raine y productos de panadería elaborados con Cori Ghazi    ? Alimentos fritos con grasas poco saludables    ? Salsa de carne y salsas, giana la [place] o la de queso    Lo que necesita saber acerca de los carbohidratos: Todos los carbohidratos se descomponen en azúcar  Los carbohidratos complejos contienen más fibra que los simples  [place] significa que los carbohidratos complejos entran en el torrente sanguíneo más lentamente y causan menos picos de azúcar en la daniel  Intenta incluir más carbohidratos complejos y menos carbohidratos simples  · Incluya carbohidratos complejos, giana los siguientes:     ? 1 tajada de pan integral    ? 1 onza de cereal seco que no contenga azúcar añadida    ? ½ taza de jimmy cocida    ? 2 onzas de pasta integral cocida    ?  ½ taza de arroz integral cocido    · Limite o no consuma carbohidratos simples, giana los siguientes:     ? Productos horneados, giana rosquillas, pasteles y galletas    ? Mezclas para pan de maíz y galletas    ? Chantell Wild y [address] [facility] de [facility], giana los [place] con queso de caja    ? Cereales instantáneos y fríos que contienen azúcar    ? Marvia Amble y helado que contienen azúcar    ? Condimentos, giana el ketchup    ? Bebidas con alto contenido en azúcar, giana refrescos, limonadas y jugos de frutas    Lo que usted necesita saber [employer] verduras y las frutas: Las verduras y las frutas pueden ser frescas, congeladas o enlatadas  Si es posible, trate de elegir opciones enlatadas bajas en sodio  · Incluya dion variedad de verduras y frutas, giana las siguientes:     ? 1 manzana, dayron o melocotón medianos (aproximadamente ½ taza picada)    ? ½ banana pequeña    ? ½ taza de bayas, giana arándanos, fresas o moras    ? 1 taza de verduras de [employer] crudas, giana [place], espinacas, col rizada o berza    ? ½ taza de verduras congeladas o enlatadas (sin sal agregada), giana judías verdes    ? ½ taza de fruta fresca, congelada o enlatada (enlatada en sirope liviano o jugo de fruta)    ? ½ taza de jugo de verduras o frutas    · Limite o no consuma verduras y frutas elaboradas de las siguientes maneras:     ? [place] congelada, giana las cerezas, con azúcar añadida    ? Gara Late en crema o salsa de [place]    ? Las verduras enlatadas son altas en sodio  ? Sauerkraut, vegetales en escabeche, y otros alimentos preparados con vinagre    ? Vegetales fritos o vegetales en mantequilla o salsas altas en grasas    Lo que necesita saber acerca de los alimentos con proteína:  · Incluya alimentos proteicos magros o bajos en grasa, giana los siguientes:     ? Dioni taryn (kraig, pavo) sin piel    ? Pescado (sobre todo pescado con grasa, giana salmón, atún fresco o caballa)    ? Carne de res o de cerdo magra (Dawn Simpers extra [place])    ?  Claras de huevo y sustitutos del huevo    ? 1 taza de [address] 1%    ? 1½ onzas de queso descremado o bajo en grasas    ? 6 onzas de yogurt descremado o bajo en grasas    · Limite o no consuma alimentos con alto contenido de proteínas, giana los siguientes     ? Dioni ahumadas o curadas, giana carne preparada con [place], [place], jamón, perros calientes, y salchichas    ? Frijoles enlatados y dioni enlatadas o en pasta, giana dioni en conserva, jennifer, anchoas y mariscos de imitación    ? Dioni para emparedado, giana [place], [facility] [place], [place], y carne en Markus    ? Dioni altas en grasas (biste estilo T-bone, carne molida para hamburguesas, costillas)    ? Huevos enteros y yemas de [place]    ? [place] Alanis, leche al 2% y crema    ? Queso normal y queso fundido    Otras pautas que debe seguir:  · Mantenga un peso saludable  Lraes riesgo de enfermedad cardíaca es aún más alto si usted tiene sobrepeso  Lares médico podría sugerirle que adelgace si tiene sobrepeso  Usted puede perder peso si se propone consumir menos calorías y alimentos que tengan azúcar y grasas agregadas  El plan de alimentación DASH puede ayudarle a lograrlo  Kadi buena forma de disminuir el consumo de calorías es consumiendo porciones más pequeñas en cada comida y menos meriendas entre comidas  Consulte a lares médico para obtener más información sobre cómo adelgazar  · Realice actividad física con regularidad  El ejercicio regular puede ayudarle a alcanzar o mantener un peso saludable  El ejercicio regular también puede ayudarle a disminuir lares presión arterial y mejorar clover niveles de colesterol  Jose E ejercicios moderados por 30 minutos o más todos los días de la [place]  Para bajar peso, asegúrese de ejercitarse por lo menos 60 minutos  Consulte con lares médico sobre un programa de ejercicio adecuado para usted  · Limite el consumo de alcohol  Las mujeres deberían limitar el consumo de alcohol a 1 bebida por día  Los hombres deberían limitar el consumo de alcohol a 2 tragos al día   Un trago equivale a 12 onzas de cerveza, 5 onzas de vino o 1 onza y ½ de licor  Para más información:  · [facility] [address] [id number]  [place] [address]  Phone: [phone]  Web Address: [facility] no    © [facility] 2022 Information is for End User's use only and may not be sold, redistributed or otherwise used for commercial purposes  All illustrations and images included in CareNotes® are the copyrighted property of A D A M , Inc  or [address] [place] [address] es sólo para uso en educación  Lares intención no es darle un consejo médico sobre enfermedades o tratamientos  Colsulte con lares Orlena Barban farmacéutico antes de seguir cualquier régimen médico para saber si es seguro y efectivo para usted

## 2022-04-19 ENCOUNTER — APPOINTMENT (OUTPATIENT)
Dept: LAB | Facility: CLINIC | Age: 67
End: 2022-04-19
Payer: COMMERCIAL

## 2022-04-19 DIAGNOSIS — I10 RESISTANT HYPERTENSION: ICD-10-CM

## 2022-04-19 DIAGNOSIS — Z13.220 SCREENING FOR HYPERLIPIDEMIA: ICD-10-CM

## 2022-04-19 LAB
ALBUMIN SERPL BCP-MCNC: 3.7 G/DL (ref 3.5–5)
ALP SERPL-CCNC: 64 U/L (ref 46–116)
ALT SERPL W P-5'-P-CCNC: 30 U/L (ref 12–78)
ANION GAP SERPL CALCULATED.3IONS-SCNC: 5 MMOL/L (ref 4–13)
AST SERPL W P-5'-P-CCNC: 24 U/L (ref 5–45)
BILIRUB SERPL-MCNC: 0.54 MG/DL (ref 0.2–1)
BUN SERPL-MCNC: 15 MG/DL (ref 5–25)
CA-I BLD-SCNC: 1.25 MMOL/L (ref 1.12–1.32)
CALCIUM SERPL-MCNC: 9.3 MG/DL (ref 8.3–10.1)
CHLORIDE SERPL-SCNC: 105 MMOL/L (ref 100–108)
CHOLEST SERPL-MCNC: 142 MG/DL
CO2 SERPL-SCNC: 29 MMOL/L (ref 21–32)
CREAT SERPL-MCNC: 0.95 MG/DL (ref 0.6–1.3)
GFR SERPL CREATININE-BSD FRML MDRD: 83 ML/MIN/1.73SQ M
GLUCOSE P FAST SERPL-MCNC: 118 MG/DL (ref 65–99)
HDLC SERPL-MCNC: 36 MG/DL
LDLC SERPL CALC-MCNC: 88 MG/DL (ref 0–100)
POTASSIUM SERPL-SCNC: 3.9 MMOL/L (ref 3.5–5.3)
PROT SERPL-MCNC: 7.6 G/DL (ref 6.4–8.2)
PTH-INTACT SERPL-MCNC: 54 PG/ML (ref 18.4–80.1)
SODIUM SERPL-SCNC: 139 MMOL/L (ref 136–145)
TRIGL SERPL-MCNC: 92 MG/DL
TSH SERPL DL<=0.05 MIU/L-ACNC: 1.92 UIU/ML (ref 0.45–4.5)

## 2022-04-19 PROCEDURE — 80061 LIPID PANEL: CPT

## 2022-04-19 PROCEDURE — 84443 ASSAY THYROID STIM HORMONE: CPT

## 2022-04-19 PROCEDURE — 36415 COLL VENOUS BLD VENIPUNCTURE: CPT

## 2022-04-19 PROCEDURE — 83970 ASSAY OF PARATHORMONE: CPT

## 2022-04-19 PROCEDURE — 80053 COMPREHEN METABOLIC PANEL: CPT

## 2022-04-19 PROCEDURE — 82330 ASSAY OF CALCIUM: CPT

## 2022-04-19 PROCEDURE — 84244 ASSAY OF RENIN: CPT

## 2022-04-19 PROCEDURE — 82088 ASSAY OF ALDOSTERONE: CPT

## 2022-04-29 LAB
ALDOST SERPL-MCNC: 6.3 NG/DL (ref 0–30)
ALDOST/RENIN PLAS-RTO: 10.8 {RATIO} (ref 0–30)
RENIN PLAS-CCNC: 0.58 NG/ML/HR (ref 0.17–5.38)

## 2022-05-03 DIAGNOSIS — I10 RESISTANT HYPERTENSION: Primary | ICD-10-CM

## 2022-05-09 ENCOUNTER — APPOINTMENT (OUTPATIENT)
Dept: LAB | Facility: CLINIC | Age: 67
End: 2022-05-09
Payer: COMMERCIAL

## 2022-05-09 DIAGNOSIS — I10 RESISTANT HYPERTENSION: ICD-10-CM

## 2022-05-09 LAB — CORTIS AM PEAK SERPL-MCNC: 13 UG/DL (ref 4.2–22.4)

## 2022-05-09 PROCEDURE — 82533 TOTAL CORTISOL: CPT

## 2022-05-10 ENCOUNTER — APPOINTMENT (OUTPATIENT)
Dept: LAB | Facility: CLINIC | Age: 67
End: 2022-05-10
Payer: COMMERCIAL

## 2022-05-10 DIAGNOSIS — I10 RESISTANT HYPERTENSION: ICD-10-CM

## 2022-05-10 PROCEDURE — 83835 ASSAY OF METANEPHRINES: CPT

## 2022-05-15 RX ORDER — ATORVASTATIN CALCIUM 20 MG/1
20 TABLET, FILM COATED ORAL
Qty: 90 TABLET | Refills: 0 | Status: CANCELLED | OUTPATIENT
Start: 2022-05-15

## 2022-05-16 ENCOUNTER — OFFICE VISIT (OUTPATIENT)
Dept: FAMILY MEDICINE CLINIC | Facility: CLINIC | Age: 67
End: 2022-05-16

## 2022-05-16 VITALS
TEMPERATURE: 97.5 F | WEIGHT: 294.6 LBS | SYSTOLIC BLOOD PRESSURE: 174 MMHG | DIASTOLIC BLOOD PRESSURE: 70 MMHG | HEART RATE: 56 BPM | OXYGEN SATURATION: 97 % | BODY MASS INDEX: 43.63 KG/M2 | RESPIRATION RATE: 18 BRPM | HEIGHT: 69 IN

## 2022-05-16 DIAGNOSIS — E78.6 LOW HDL (UNDER 40): ICD-10-CM

## 2022-05-16 DIAGNOSIS — I10 ESSENTIAL HYPERTENSION: Primary | ICD-10-CM

## 2022-05-16 PROCEDURE — 99213 OFFICE O/P EST LOW 20 MIN: CPT | Performed by: FAMILY MEDICINE

## 2022-05-16 RX ORDER — NIFEDIPINE 30 MG/1
30 TABLET, FILM COATED, EXTENDED RELEASE ORAL DAILY
Qty: 30 TABLET | Refills: 0 | Status: SHIPPED | OUTPATIENT
Start: 2022-05-16 | End: 2022-05-27

## 2022-05-16 RX ORDER — ROSUVASTATIN CALCIUM 10 MG/1
10 TABLET, COATED ORAL
Qty: 30 TABLET | Refills: 2 | Status: SHIPPED | OUTPATIENT
Start: 2022-05-16 | End: 2022-08-10

## 2022-05-16 NOTE — ASSESSMENT & PLAN NOTE
With a low HDL (36), pre-diabetes hx and HTN patient has metabolic syndrome  The USPSTF recommends that clinicians prescribe a statin for the primary prevention of CVD for adults who are ages 36 to 76 years, have one or more of the following CVD risk factors (dyslipidemia, diabetes, hypertension, or smoking), and have an estimated 10-year risk of a cardiovascular event of 10% or greater  Pt has CVD risk factors (HTN, pre-diabetes), and ASCVD-10 yr risk 24 9 %  Shared decision making with patient to start Crestor 10 mg HS   He could not tolerate Lipitor in the past 2/2 to myalgia side effects  Repeat lipid panel in 3 months

## 2022-05-16 NOTE — PATIENT INSTRUCTIONS
Plan de alimentación con "enfoque dietético para detener la hipertensión (DASH, por clover siglas en inglés)   LO QUE NECESITA SABER:   El plan de alimentación DASH está diseñado para ayudar a prevenir o disminuir la hipertensión  También puede ayudar a bajar el colesterol samantha (colesterol LDL) y disminuir lares riesgo de enfermedad cardíaca  El plan es bajo en sodio, azúcar, grasas dañinas, y grasas en lares totalidad  Es alto en potasio, calcio, magnesio y [place]  Estos nutrientes se agregan al consumir más frutas, vegetales y granos enteros  Con el plan de alimentación DASH, usted necesita consumir un número específico de porciones de cada sydnie de alimentos  [place] le ayudará a consumir las cantidades suficientes de ciertos nutrientes y limitar otros  La cantidad de porciones que usted debe comer depende de la cantidad de calorías que usted necesita  Lares dietista puede ayudarlo a crear planes de comidas con la cantidad [place] de porciones para cada sydnie de alimentos  INSTRUCCIONES SOBRE [facility]IA:   Lo que necesita saber acerca del sodio: Lares dietista le indicará la cantidad de sodio que usted debe consumir a diario  La gente que tiene la presión arterial juliet debe consumir de 1,500 a 2,300 mg de sodio al día giana kandis  Dion cucharadita (cdta) de sal tiene 2,300 mg de sodio  [place] puede parecer giana dion meta difícil, ke[place]n pequeños cambios en los alimentos que usted consume pueden hacer dion gran diferencia  Lares médico o dietista puede ayudarlo a crear un plan alimenticio que cumpla lares límite de sodio  Olivia las etiquetas de los alimentos  Las etiquetas pueden ayudarle a escoger alimentos bajos en sodio  La cantidad de sodio está incluida en miligramos (mg)  La columna del porcentaje de valor diario indica la cantidad de necesidades diarias satisfechas con 1 porción del alimento para cada nutriente en la lista  Escoja alimentos que tengan menos de 5% del porcentaje diario de [place]   Estos alimentos se consideran bajos en sodio  Los alimentos que tienen 20% o más del porcentaje diario de sodio se consideran alimentos altos en sodio  Evite alimentos que tengan más de 300 mg de sodio por porción  Escoja alimentos Anthony Mike diga que son bajos en sodio, con sodio reducido, o sin sal agregada  Limite la sal agregada  No sale la comida en la butt si se añade sal al cocinar  Use hierbas y condimentos, giana cebollas, ajo y especias sin sal para agregar sabor  Use jugo de lima, ulisses o vinagre para agregar un sabor ácido  Use chiles picantes o dion cantidad pequeña de salsa picante para agregar un sabor picante  Limite los alimentos con alto contenido de sal agregada, giana los siguientes:    Condimentos hechos con sal, giana sal de ajo, sal de apio, sal de cebolla, sal condimentada, suavizantes para haile, y glutamato de monosodio (MSG, por clover siglas en inglés)    Sopa Miso y mezclas para sopa enlatadas o secas    Salsa de soya regular, salsa de [place], [address], salsa para bistec, [address], y la mayoría de las vinagres con sabor    Alimentos para merendar, giana yvette tostadas, palomitas de [place], pretzels, piel de cerdo, galletas de soda [facility], y nueces [employer], giana cenas, entradas, vegetales en salsa, y haile [employer] sustitutos para la sal  Pregúntele a lares médico si es posible usar sustitutos de la sal  Algunos sustitutos de la sal vienen con ingredientes que pueden ser dañinos si usted tiene ciertos padecimientos médicos  Escoja los alimentos cuidadosamente cuando sale a comer a restaurantes: las comidas de los restaurantes, sobre todo restaurantes de comida rápida, vic siempre son altas en sodio  Algunos restaurantes ofrecen información nutricional que indica la cantidad de sodio en clover alimentos   Pida que preparen clover comidas con menos sal o sin sal     Lo que necesita saber acerca de las grasas: Las grasas insaturadas y los ácidos grasos omega-3 son ejemplos de grasas saludables  Las grasas no saludables incluyen las grasas saturadas y las grasas trans  Incluya grasas saludables, giana las siguientes:     Aceites de cocina, giana el de soja, [place], [place] o girasol    Pescados grasos, giana el salmón, el atún, la caballa o las jennifer    Aceite de linaza o linaza molida    ½ taza de frijoles cocidos, giana frijoles negros, frijoles rojos o frijoles pintos    1½ onzas de gavin secos bajos en sodio, giana almendras o nueces    [place] de maní baja en azúcar y sodio    [place], giana las de chía o girasol       Limite o no consuma grasas poco saludables, giana los siguientes:     Alimentos que contienen grasa de origen animal, giana las haile grasas, la [place], la [place] y la crema    Agia Thekla, margarina en jalen, aceite de anderson y aceite de laine     Aderezo de ensalada completo o cremoso    Sopa cremosa    Galletas, yvette fritas y productos de panadería elaborados con margarina o manteca    Alimentos fritos con grasas poco saludables    Salsa de carne y salsas, giana la [place] o la de queso    Lo que necesita saber acerca de los carbohidratos: Todos los carbohidratos se descomponen en azúcar  Los carbohidratos complejos contienen más fibra que los simples  [place] significa que los carbohidratos complejos entran en el torrente sanguíneo más lentamente y causan menos picos de azúcar en la daniel  Intenta incluir más carbohidratos complejos y menos carbohidratos simples    Incluya carbohidratos complejos, giana los siguientes:     1 tajada de pan integral    1 onza de cereal seco que no contenga azúcar añadida    ½ taza de jimmy cocida    2 onzas de pasta integral cocida    ½ taza de arroz integral cocido    Limite o no consuma carbohidratos simples, giana los siguientes:     Productos horneados, giana rosquillas, pasteles y galletas    Mezclas para pan de maíz y galletas    Arroz reinoso y mezclas de pasta, giana los [place] con queso de caja    Cereales instantáneos y fríos que contienen azúcar    Lenn Guest y helado que contienen azúcar    Condimentos, giana el ketchup    Bebidas con alto contenido en azúcar, giana refrescos, limonadas y jugos de frutas    Lo que usted necesita saber sobre las verduras y las frutas: Las verduras y las frutas pueden ser frescas, congeladas o enlatadas  Si es posible, trate de elegir opciones enlatadas bajas en sodio  Incluya dion variedad de verduras y frutas, giana las siguientes:     1 [place]us ilan, dayron o melocotón medianos (aproximadamente ½ taza picada)    ½ banana pequeña    ½ taza de bayas, giana arándanos, fresas o moras    1 taza de verduras de hoja anurag crudas, giana [place], espinacas, col rizada o berza    ½ taza de verduras congeladas o enlatadas (sin sal agregada), giana judías verdes    ½ taza de fruta fresca, congelada o enlatada (enlatada en sirope liviano o jugo de fruta)    ½ taza de jugo de verduras o frutas    Limite o no consuma verduras y frutas elaboradas de las siguientes maneras:     [place] congelada, giana las cerezas, con azúcar añadida    Frutas en crema o salsa de [place]    Las verduras enlatadas son altas en sodio  Sauerkraut, vegetales en escabeche, y otros alimentos preparados con vinagre    Vegetales fritos o vegetales en mantequilla o salsas altas en grasas    Lo que necesita saber acerca de los alimentos con proteína:   Incluya alimentos proteicos magros o bajos en grasa, giana los siguientes:     Dioni taryn (kraig, [place]) sin piel    Pescado (sobre todo pescado con grasa, giana salmón, atún fresco o caballa)    [place] de res o de cerdo magra (makenna, carne molida extra Jame [place]um[place])    Claras de [place] y sustitutos del huevo    1 taza de leche descremada o leche 1%    1½ onzas de queso descremado o bajo en grasas    6 onzas de yogurt descremado o bajo en grasas    Limite o no consuma alimentos con alto contenido de proteínas, [place] siguientes     [address] o [place], giana carne preparada con maíz, tocineta, jamón, perros calientes, y salchichas    Frijoles enlatados y dioni enlatadas o en pasta, giana dioni en conserva, jennifer, anchoas y [address] de [address] para emparedado, giana [place], [place], [place], y carne en rebanada    Dioni altas en grasas (biste estilo T-bone, carne molida para hamburguesas, costillas)    Huevos enteros y yemas de [place]    Leche [place], [place] al 2% y crema    Queso normal y queso fundido    Otras pautas que debe seguir:  Mantenga un peso saludable  Lares riesgo de enfermedad cardíaca es aún más alto si usted tiene sobrepeso  Lares médico podría sugerirle que adelgace si tiene sobrepeso  Usted puede perder peso si se propone consumir menos calorías y alimentos que tengan azúcar y grasas agregadas  El plan de alimentación DASH puede ayudarle a lograrlo  Kadi buena forma de disminuir el consumo de calorías es consumiendo porciones más pequeñas en cada comida y menos meriendas entre comidas  Consulte a lares médico para obtener más información sobre cómo adelgazar  Realice actividad física con regularidad  El ejercicio regular puede ayudarle a alcanzar o mantener un peso saludable  El ejercicio regular también puede ayudarle a disminuir lares presión arterial y mejorar clover niveles de colesterol  Jose E ejercicios moderados por 30 minutos o más todos los días de la [place]  Para bajar peso, asegúrese de ejercitarse por lo menos 60 minutos  Consulte con lares médico sobre un programa de ejercicio adecuado para usted  Limite el consumo de alcohol  Las mujeres deberían limitar el consumo de alcohol a 1 bebida por día  Los hombres deberían limitar el consumo de alcohol a 2 tragos al día  Un trago equivale a 12 onzas de cerveza, 5 onzas de vino o 1 onza y ½ de licor  Para más información:  [facility] [id number]  [place] [address]  Phone: [phone]  Web Address: [employer] no    © Copyright kwiry 2022 Information is for End User's use only and may not be sold, redistributed or otherwise used for commercial purposes  All illustrations and images included in CareNotes® are the copyrighted property of A Kristian KING  or [address] [place] [address] es sólo para uso en educación  De [place] intención no es darle un consejo médico sobre enfermedades o tratamientos  Colsulte con de león Verónica Chace farmacéutico antes de seguir cualquier régimen médico para saber si es seguro y efectivo para usted

## 2022-05-16 NOTE — ASSESSMENT & PLAN NOTE
Bp goal per JNC-8 criteria <150/90, not at goal, asymptomatic  I suspect he has resistant HTN 2/2 to undiagnosed sleep apnea  Referral to sleep medicine to r/o ALEK  He has appt scheduled on 06/01/22  Continue Lisinopril 40 mg, Nadalol 80 mg and Chlorthalidone 50 mg daily  Start Nifedipine 24 hr tablet 30 mg Am for tighter BP control  If he cannot tolerate CCB, consider Clonidine weekly patch  Given recent wt gain, I've recommended he looses wt, referral to wt management placed  Investigation of 2nd causes of HTN such as BASHIR initiated  Awaiting to complete renal US to r/o BASHIR (scheduled on 05/27/22)  Reviewed labs for 2nd causes of HTN: TSH, CMP, aldosterone/renin ratio, PTH, morning cortisol all wnl  The only lab pending final result is 24 hrs urine metanephrines     F/u in 2 weeks for BP check with nurse and in 4 weeks with me in the office

## 2022-05-16 NOTE — PROGRESS NOTES
Assessment/Plan:    Essential hypertension  Bp goal per JNC-8 criteria <150/90, not at goal, asymptomatic  I suspect he has resistant HTN 2/2 to undiagnosed sleep apnea  Referral to sleep medicine to r/o ALEK  He has appt scheduled on 06/01/22  Continue Lisinopril 40 mg, Nadalol 80 mg and Chlorthalidone 50 mg daily  Start Nifedipine 24 hr tablet 30 mg Am for tighter BP control  If he cannot tolerate CCB, consider Clonidine weekly patch  Given recent wt gain, I've recommended he looses wt, referral to wt management placed  Investigation of 2nd causes of HTN such as BASHIR initiated  Awaiting to complete renal US to r/o BASHIR (scheduled on 05/27/22)  Reviewed labs for 2nd causes of HTN: TSH, CMP, aldosterone/renin ratio, PTH, morning cortisol all wnl  The only lab pending final result is 24 hrs urine metanephrines  F/u in 2 weeks for BP check with nurse and in 4 weeks with me in the office    Low HDL (under 40)  With a low HDL (36), pre-diabetes hx and HTN patient has metabolic syndrome  The USPSTF recommends that clinicians prescribe a statin for the primary prevention of CVD for adults who are ages 36 to 76 years, have one or more of the following CVD risk factors (dyslipidemia, diabetes, hypertension, or smoking), and have an estimated 10-year risk of a cardiovascular event of 10% or greater  Pt has CVD risk factors (HTN, pre-diabetes), and ASCVD-10 yr risk 24 9 %  Shared decision making with patient to start Crestor 10 mg HS  He could not tolerate Lipitor in the past 2/2 to myalgia side effects  Repeat lipid panel in 3 months          Diagnoses and all orders for this visit:    Essential hypertension  -     NIFEdipine ER (ADALAT CC) 30 MG 24 hr tablet; Take 1 tablet (30 mg total) by mouth in the morning  Low HDL (under 40)  -     rosuvastatin (CRESTOR) 10 MG tablet; Take 1 tablet (10 mg total) by mouth daily at bedtime  -     Lipid Panel with Direct LDL reflex;  Future          Subjective:      Patient ID: [facility] Horace Carty is a 78 y o  male  HPI     Patient presents today to the office for HTN f/u visit       Endorses compliance with his BP medications as prescribed  Pt mentions he scheduled renal US appt on 05/27/22  He also has cardio appt on 05/27/22 set up  Last Bp follow up was 2 weeks ago with nurse  Mentions he has not checked Bp recently at home  Pt denies any CV sx today  The following portions of the patient's history were reviewed and updated as appropriate: allergies, current medications, past family history, past medical history, past social history, past surgical history and problem list     Review of Systems   Constitutional: Negative for chills and fever  Eyes: Negative for visual disturbance  Respiratory: Negative  Negative for cough and shortness of breath  Cardiovascular: Negative for chest pain and leg swelling  Gastrointestinal: Negative  Negative for abdominal pain, blood in stool, constipation, diarrhea, nausea and vomiting  Genitourinary: Negative  Negative for dysuria and hematuria  Skin: Negative for rash  Neurological: Negative for dizziness and headaches  Psychiatric/Behavioral: The patient is not nervous/anxious  Objective:    BP (!) 174/70 (BP Location: Left arm, Patient Position: Sitting, Cuff Size: Standard)   Pulse 56   Temp 97 5 °F (36 4 °C) (Temporal)   Resp 18   Ht 5' 9" (1 753 m)   Wt 134 kg (294 lb 9 6 oz)   SpO2 97%   BMI 43 50 kg/m²      Physical Exam  Vitals and nursing note reviewed  Constitutional:       General: He is not in acute distress  Appearance: Normal appearance  He is well-developed  He is obese  He is not ill-appearing, toxic-appearing or diaphoretic  HENT:      Head: Normocephalic and atraumatic  Nose: Nose normal    Eyes:      General:         Right eye: No discharge  Left eye: No discharge  Extraocular Movements: Extraocular movements intact        Conjunctiva/sclera: Conjunctivae normal  Cardiovascular:      Rate and Rhythm: Normal rate and regular rhythm  Heart sounds: Normal heart sounds  Pulmonary:      Effort: Pulmonary effort is normal  No respiratory distress  Breath sounds: Normal breath sounds  Abdominal:      Palpations: Abdomen is soft  Tenderness: There is no abdominal tenderness  Musculoskeletal:         General: Normal range of motion  Cervical back: Normal range of motion and neck supple  Right lower leg: No edema  Left lower leg: No edema  Skin:     General: Skin is warm  Findings: No rash  Neurological:      Mental Status: He is alert and oriented to person, place, and time  Psychiatric:         Mood and Affect: Mood normal          Behavior: Behavior normal          Thought Content:  Thought content normal          Judgment: Judgment normal

## 2022-05-17 LAB
METANEPH 24H UR-MRATE: 91 UG/24 HR (ref 58–276)
METANEPHS 24H UR-MCNC: 101 UG/L
NORMETANEPHRINE 24H UR-MCNC: 229 UG/L
NORMETANEPHRINE 24H UR-MRATE: 206 UG/24 HR (ref 156–729)

## 2022-05-24 DIAGNOSIS — I10 RESISTANT HYPERTENSION: ICD-10-CM

## 2022-05-24 RX ORDER — NADOLOL 80 MG/1
TABLET ORAL
Qty: 30 TABLET | Refills: 0 | Status: SHIPPED | OUTPATIENT
Start: 2022-05-24 | End: 2022-05-27

## 2022-05-27 ENCOUNTER — OFFICE VISIT (OUTPATIENT)
Dept: CARDIOLOGY CLINIC | Facility: CLINIC | Age: 67
End: 2022-05-27
Payer: COMMERCIAL

## 2022-05-27 VITALS
DIASTOLIC BLOOD PRESSURE: 80 MMHG | SYSTOLIC BLOOD PRESSURE: 200 MMHG | BODY MASS INDEX: 43.87 KG/M2 | HEIGHT: 69 IN | HEART RATE: 62 BPM | WEIGHT: 296.2 LBS

## 2022-05-27 DIAGNOSIS — I10 SEVERE UNCONTROLLED HYPERTENSION: Primary | ICD-10-CM

## 2022-05-27 DIAGNOSIS — E78.6 LOW HDL (UNDER 40): ICD-10-CM

## 2022-05-27 DIAGNOSIS — R29.818 SUSPECTED SLEEP APNEA: ICD-10-CM

## 2022-05-27 DIAGNOSIS — E66.01 CLASS 3 SEVERE OBESITY DUE TO EXCESS CALORIES WITHOUT SERIOUS COMORBIDITY WITH BODY MASS INDEX (BMI) OF 40.0 TO 44.9 IN ADULT (HCC): ICD-10-CM

## 2022-05-27 DIAGNOSIS — I11.9 HYPERTENSIVE HEART DISEASE WITHOUT HEART FAILURE: ICD-10-CM

## 2022-05-27 DIAGNOSIS — I10 ESSENTIAL HYPERTENSION: ICD-10-CM

## 2022-05-27 PROCEDURE — 93000 ELECTROCARDIOGRAM COMPLETE: CPT | Performed by: INTERNAL MEDICINE

## 2022-05-27 PROCEDURE — 99204 OFFICE O/P NEW MOD 45 MIN: CPT | Performed by: INTERNAL MEDICINE

## 2022-05-27 RX ORDER — NIFEDIPINE 30 MG/1
30 TABLET, EXTENDED RELEASE ORAL EVERY EVENING
Qty: 90 TABLET | Refills: 3 | Status: SHIPPED | OUTPATIENT
Start: 2022-05-27

## 2022-05-27 RX ORDER — NIFEDIPINE 60 MG/1
60 TABLET, EXTENDED RELEASE ORAL EVERY MORNING
Qty: 90 TABLET | Refills: 3 | Status: SHIPPED | OUTPATIENT
Start: 2022-05-27

## 2022-05-27 RX ORDER — CARVEDILOL 12.5 MG/1
12.5 TABLET ORAL 2 TIMES DAILY WITH MEALS
Qty: 180 TABLET | Refills: 3 | Status: SHIPPED | OUTPATIENT
Start: 2022-05-27 | End: 2022-06-13 | Stop reason: SDUPTHER

## 2022-05-27 NOTE — PROGRESS NOTES
CARDIOLOGY [facility]OCI[facility]  [facility] [address], [place]  Phone#  [phone]  Fax#  [phone]  *-*-*-*-*-*-*-*-*-*-*-*-*-*-*-*-*-*-*-*-*-*-*-*-*-*-*-*-*-*-*-*-*-*-*-*-*-*-*-*-*-*-*-*-*-*-*-*-*-*-*-*-*-*  ENCOUNTER DATE: 05/27/22 4:29 PM  PATIENT NAME: Tasha Chowdhury   1955    [id number]  Age: 78 y o  Sex: male  AUTHOR: Jayne Keene MD  PRIMARYCARE PHYSICIAN: Yudy Goldman MD  REFERRING PHYSICIAN: MD [facility],  [facility] Emma Morse MD   *-*-*-*-*-*-*-*-*-*-*-*-*-*-*-*-*-*-*-*-*-*-*-*-*-*-*-*-*-*-*-*-*-*-*-*-*-*-*-*-*-*-*-*-*-*-*-*-*-*-*-*-*-*-  REASON FOR REFERRAL:  Evaluation and comanagement of uncontrolled hypertension  [id number]  *-*-*-*-*-*-*-*-*-*-*-*-*-*-*-*-*-*-*-*-*-*-*-*-*-*-*-*-*-*-*-*-*-*-*-*-*-*-*-*-*-*-*-*-*-*-*-*-*-*-*-*-*-*-  CARDIOLOGY ASSESSMENT & PLAN:   Diagnosis ICD-10-CM Associated Orders   1  Severe uncontrolled hypertension  I10 carvedilol (COREG) 12 5 mg tablet     NIFEdipine (PROCARDIA XL) 60 mg 24 hr tablet     NIFEdipine (PROCARDIA XL) 30 mg 24 hr tablet     Echo complete w/ contrast if indicated     NT-BNP PRO     Basic metabolic panel   2  Essential hypertension  I10 POCT ECG   3  Hypertensive heart disease without heart failure  I11 9 Echo complete w/ contrast if indicated     NT-BNP PRO     Basic metabolic panel   4  Low HDL (under 40)  E78 6    5  Suspected sleep apnea  R29 818 Echo complete w/ contrast if indicated   6  Class 3 severe obesity due to excess calories without serious comorbidity with body mass index (BMI) of 40 0 to 44 9 in adult [facility])  E66 01     Z68 41      Severe uncontrolled hypertension  Mr  Name has severely uncontrolled hypertension despite being on multiple antihypertensive  Fortunately he denies any symptoms of target organ damage  His renal function is stable  His thyroid function is normal   His workup for secondary causes of hypertension thus far has been negative    I suspect his uncontrolled hypertension is more due to untreated sleep apnea  His medications have been changed recently however his blood pressure remains markedly elevated  I am making the following changes today:  -- STOP NADOLOL  -- BEGIN CARVEDILOL 12 5 MG TWICE DAILY  -- TAKE PROCARDIA XL 60 MG IN THE MORNING AND 30 MG IN THE EVENING   -- CONTINUE TAKING LISINOPRIL 40 MG DAILY IN THE MORNING   -- CONTINUE TAKING CHLORTHALIDONE 50 MG DAILY IN THE MORNING  Further other instructions are given:  -- is advised to have blood work including CMP and NT proBNP level in 2-3 weeks  -- we are arranging for an echocardiogram to assess cardiac structure and function and rule out hypertensive heart disease and diastolic dysfunction  -- he is advised to cut down intake of salt in his food  I would like to see him back after echocardiogram to consider further therapy if necessary  He is advised to call us if he develops any concerning symptoms such as increasing fatigue headache lightheadedness dizziness or experiences any passing out or near passing out episodes after making the medication changes  we are changing patient's nadolol to carvedilol  He is advised to take carvedilol 12 5 mg twice daily  He is encouraged t try to lose weight through dietary changes and increasing physical activity  *-*-*-*-*-*-*-*-*-*-*-*-*-*-*-*-*-*-*-*-*-*-*-*-*-*-*-*-*-*-*-*-*-*-*-*-*-*-*-*-*-*-*-*-*-*-*-*-*-*-*-*-*-*-  CURRENT ECG:  Results for orders placed or performed in visit on 05/27/22   POCT ECG    Narrative    Sinus rhythm, HR 62 beats per minute, normal axis and intervals, borderline incomplete right bundle-branch block, no significant chamber hypertrophy or enlargement  No evidence of prior infarction       *-*-*-*-*-*-*-*-*-*-*-*-*-*-*-*-*-*-*-*-*-*-*-*-*-*-*-*-*-*-*-*-*-*-*-*-*-*-*-*-*-*-*-*-*-*-*-*-*-*-*-*-*-*-  HISTORY OF PRESENT ILLNESS:  Patient is a pleasant 71-year-old gentleman of [address] [place] [address] who is here for establishment of care for evaluation and management of difficult to control hypertension  He primarily speaks Ma[place],  encounter is performed with use of [employer]  telephone translation service (  [id number] )  His prior medical history is significant for hypertension diagnosed approximately 5 years back, pre diabetes, incidental finding of adrenal adenoma during abdominal scan to evaluate abdominal pain and the history of dyslipidemia  He denies any prior history of coronary artery disease, congestive heart failure, arrhythmias, TIA/CVA, surgeries or any kind of cancer  From a symptom perspective he denies any recent chest pain or shortness of breath or dizziness or lightheadedness or headaches or visual disturbance or swelling in the legs  He does admit to being told that he snores a lot  He denies any daytime fatigue  He reports being fairly active though he does not exercise regularly  He reports being compliant with medications  He has never been a smoker  He denies any alcohol drinking or illicit drug use  He also denies drinking soda or having juices  Does drink 1 cup of coffee a day  There is a family history of CAD in later age  Multiple members in the family have high blood pressure and other comorbidities  Hi current cardiac medications include nadolol 80 mg daily, chlorthalidone 50 mg daily, Procardia XL 30 mg daily which was added recently, lisinopril 40 mg daily and rosuvastatin 10 mg daily  He was previously on metoprolol but had weight gain and fatigue  He was previously on amlodipine but had 9 minutes and difficulty sleeping  His last blood work was 1 April 19, 2022  His renal function was normal, potassium was 3 9, LFTs were normal, his total cholesterol was 142, triglyceride was in 92, HDL was 36, calculated LDL was 88  His last hemoglobin A1c was 6 1   TSH was 1 92    Recently his renin, aldosterone and cortisol levels were checked and they were normal     He had an abdominal CT in 2019 that was significant for left adrenal nodule representing adrenal adenoma  He denies having previous echocardiogram or a stress test   *-*-*-*-*-*-*-*-*-*-*-*-*-*-*-*-*-*-*-*-*-*-*-*-*-*-*-*-*-*-*-*-*-*-*-*-*-*-*-*-*-*-*-*-*-*-*-*-*-*-*-*-*-*  PAST MEDICAL HISTORY:  Past Medical History:   Diagnosis Date    Hypertension     Prediabetes     PAST SURGICAL HISTORY:   No past surgical history on file        FAMILY HISTORY:  Family History   Problem Relation Age of Onset    Hypertension Mother     Coronary artery disease Mother     Hypertension Father     Coronary artery disease Father     Hypertension Sister     Coronary artery disease Sister     Hypertension Brother     Coronary artery disease Brother     Hypertension Paternal Uncle     Stroke Paternal Uncle     SOCIAL HISTORY:  Social History     Tobacco Use   Smoking Status Never Smoker   Smokeless Tobacco Never Used   Tobacco Comment    only tried one cigarette when teenager      Social History     Substance and Sexual Activity   Alcohol Use Never     Social History     Substance and Sexual Activity   Drug Use Never    [unfilled]     *-*-*-*-*-*-*-*-*-*-*-*-*-*-*-*-*-*-*-*-*-*-*-*-*-*-*-*-*-*-*-*-*-*-*-*-*-*-*-*-*-*-*-*-*-*-*-*-*-*-*-*-*-*  ALLERGIES:  Allergies   Allergen Reactions    Amlodipine Other (See Comments)     Nightmares and difficulty sleeping    CURRENT SCHEDULED MEDICATIONS:    Current Outpatient Medications:     carvedilol (COREG) 12 5 mg tablet, Take 1 tablet (12 5 mg total) by mouth 2 (two) times a day with meals, Disp: 180 tablet, Rfl: 3    chlorthalidone (HYGROTEN) 50 MG tablet, Take 1 tablet (50 mg total) by mouth daily, Disp: 30 tablet, Rfl: 2    lisinopril (ZESTRIL) 40 mg tablet, Take 1 tablet (40 mg total) by mouth daily at bedtime, Disp: 30 tablet, Rfl: 3    NIFEdipine (PROCARDIA XL) 30 mg 24 hr tablet, Take 1 tablet (30 mg total) by mouth every evening, Disp: 90 tablet, Rfl: 3   NIFEdipine (PROCARDIA XL) 60 mg 24 hr tablet, Take 1 tablet (60 mg total) by mouth every morning, Disp: 90 tablet, Rfl: 3    rosuvastatin (CRESTOR) 10 MG tablet, Take 1 tablet (10 mg total) by mouth daily at bedtime, Disp: 30 tablet, Rfl: 2     *-*-*-*-*-*-*-*-*-*-*-*-*-*-*-*-*-*-*-*-*-*-*-*-*-*-*-*-*-*-*-*-*-*-*-*-*-*-*-*-*-*-*-*-*-*-*-*-*-*-*-*-*-*  REVIEW OF SYMPTOMS:    Positive for:  As noted above in HPI  Negative for: All remaining as reviewed below and in HPI   SYSTEM SYMPTOMS REVIEWED:  General--weight change, fever, night sweats  Respiratoryl-- Wheezing, shortness of breath, cough, URI symptoms, sputum, blood  Cardiovascular--chest pain, syncope, dyspnea on exertion, edema, decline in exercise tolerance, claudication   Gastrointestinal--persistent vomiting, diarrhea, abdominal distention, blood in stool   Muscular or skeletal--joint pain or swelling   Neurologic--headaches, syncope, abnormal movement  Hematologic--history of easy bruising and bleeding   Endocrine--thyroid enlargement, heat or cold intolerance, polyuria   Psychiatric--anxiety, depression      *-*-*-*-*-*-*-*-*-*-*-*-*-*-*-*-*-*-*-*-*-*-*-*-*-*-*-*-*-*-*-*-*-*-*-*-*-*-*-*-*-*-*-*-*-*-*-*-*-*-*-*-*-*  CURRENT OUTPATIENT MEDICATIONS:     Current Outpatient Medications:     carvedilol (COREG) 12 5 mg tablet, Take 1 tablet (12 5 mg total) by mouth 2 (two) times a day with meals, Disp: 180 tablet, Rfl: 3    chlorthalidone (HYGROTEN) 50 MG tablet, Take 1 tablet (50 mg total) by mouth daily, Disp: 30 tablet, Rfl: 2    lisinopril (ZESTRIL) 40 mg tablet, Take 1 tablet (40 mg total) by mouth daily at bedtime, Disp: 30 tablet, Rfl: 3    NIFEdipine (PROCARDIA XL) 30 mg 24 hr tablet, Take 1 tablet (30 mg total) by mouth every evening, Disp: 90 tablet, Rfl: 3    NIFEdipine (PROCARDIA XL) 60 mg 24 hr tablet, Take 1 tablet (60 mg total) by mouth every morning, Disp: 90 tablet, Rfl: 3    rosuvastatin (CRESTOR) 10 MG tablet, Take 1 tablet (10 mg total) by mouth daily at bedtime, Disp: 30 tablet, Rfl: 2    *-*-*-*-*-*-*-*-*-*-*-*-*-*-*-*-*-*-*-*-*-*-*-*-*-*-*-*-*-*-*-*-*-*-*-*-*-*-*-*-*-*-*-*-*-*-*-*-*-*-*-*-*-*  VITAL SIGNS:  Vitals:    05/27/22 1529   BP: (!) 200/80   BP Location: Left arm   Patient Position: Sitting   Cuff Size: Large   Pulse: 62   Weight: 134 kg (296 lb 3 2 oz)   Height: 5' 9" (1 753 m)       BMI: Body mass index is 43 74 kg/m²  WEIGHTS:   Wt Readings from Last 25 Encounters:   05/27/22 134 kg (296 lb 3 2 oz)   05/16/22 134 kg (294 lb 9 6 oz)   04/14/22 134 kg (295 lb 14 4 oz)   03/03/22 136 kg (299 lb 3 2 oz)   12/09/21 130 kg (286 lb)   08/18/21 128 kg (282 lb)   08/09/21 126 kg (276 lb 11 2 oz)   08/03/21 126 kg (278 lb)   07/07/21 126 kg (278 lb)   02/05/21 (!) 137 kg (302 lb 8 oz)   05/05/20 136 kg (300 lb)   04/03/20 136 kg (300 lb)   03/11/20 133 kg (293 lb 9 6 oz)   10/15/19 134 kg (296 lb)   08/26/19 132 kg (290 lb)   07/10/19 132 kg (291 lb)   04/11/19 130 kg (286 lb)   03/14/19 127 kg (281 lb)   02/28/19 129 kg (285 lb)   07/19/18 129 kg (285 lb)        *-*-*-*-*-*-*-*-*-*-*-*-*-*-*-*-*-*-*-*-*-*-*-*-*-*-*-*-*-*-*-*-*-*-*-*-*-*-*-*-*-*-*-*-*-*-*-*-*-*-*-*-*-*-  PHYSICAL EXAM:  General Appearance:    Alert, cooperative, no distress, appears stated age, large built, morbidly obese   Head, Eyes, ENT:    No obvious abnormality, moist mucous mebranes  Neck:   Supple, no carotid bruit or JVD   Back:     Symmetric, no curvature  Lungs:     Respirations unlabored  Clear to auscultation bilaterally,    Chest wall:    No tenderness or deformity   Heart:    Regular rate and rhythm, S1 and S2 normal, no murmur, rub  or gallop  Abdomen:     Soft, non-tender, No obvious masses, or organomegaly   Extremities:   Extremities warm, no cyanosis or edema    Skin:   mild venostatic changes in lower extremities  Normal skin color, texture, and turgor   No rashes or lesions *-*-*-*-*-*-*-*-*-*-*-*-*-*-*-*-*-*-*-*-*-*-*-*-*-*-*-*-*-*-*-*-*-*-*-*-*-*-*-*-*-*-*-*-*-*-*-*-*-*-*-*-*-*-  LABORATORY DATA: I have personally reviewed the available laboratory data          Potassium   Date Value Ref Range Status   04/19/2022 3 9 3 5 - 5 3 mmol/L Final   03/04/2022 4 1 3 5 - 5 3 mmol/L Final   12/15/2021 4 1 3 5 - 5 3 mmol/L Final   05/16/2018 4 8 3 6 - 5 0 MEQ/L Final     Chloride   Date Value Ref Range Status   04/19/2022 105 100 - 108 mmol/L Final   03/04/2022 105 100 - 108 mmol/L Final   12/15/2021 106 100 - 108 mmol/L Final   05/16/2018 103 97 - 108 MEQ/L Final     CO2   Date Value Ref Range Status   04/19/2022 29 21 - 32 mmol/L Final   03/04/2022 29 21 - 32 mmol/L Final   12/15/2021 31 21 - 32 mmol/L Final   05/16/2018 31 (H) 22 - 30 MMOL/L Final     Anion Gap   Date Value Ref Range Status   05/16/2018 9 5 - 14 MMOL/L Final     BUN   Date Value Ref Range Status   04/19/2022 15 5 - 25 mg/dL Final   03/04/2022 18 5 - 25 mg/dL Final   12/15/2021 18 5 - 25 mg/dL Final   05/16/2018 18 5 - 25 MG/DL Final     Creatinine   Date Value Ref Range Status   04/19/2022 0 95 0 60 - 1 30 mg/dL Final     Comment:     Standardized to IDMS reference method   03/04/2022 0 89 0 60 - 1 30 mg/dL Final     Comment:     Standardized to IDMS reference method   12/15/2021 0 88 0 60 - 1 30 mg/dL Final     Comment:     Standardized to IDMS reference method     eGFR   Date Value Ref Range Status   04/19/2022 83 ml/min/1 73sq m Final   03/04/2022 89 ml/min/1 73sq m Final   12/15/2021 90 ml/min/1 73sq m Final     Glucose   Date Value Ref Range Status   05/16/2018 103 (H) 70 - 99 MG/DL Final     Calcium   Date Value Ref Range Status   04/19/2022 9 3 8 3 - 10 1 mg/dL Final   03/04/2022 10 2 (H) 8 3 - 10 1 mg/dL Final   12/15/2021 10 1 8 3 - 10 1 mg/dL Final   05/16/2018 9 6 8 4 - 10 2 MG/DL Final     AST   Date Value Ref Range Status   04/19/2022 24 5 - 45 U/L Final     Comment:     Specimen collection should occur prior to Sulfasalazine administration due to the potential for falsely depressed results  03/04/2022 16 5 - 45 U/L Final     Comment:     Specimen collection should occur prior to Sulfasalazine administration due to the potential for falsely depressed results  08/03/2021 18 5 - 45 U/L Final     Comment:     Specimen collection should occur prior to Sulfasalazine administration due to the potential for falsely depressed results  05/16/2018 21 17 - 59 U/L Final     ALT   Date Value Ref Range Status   04/19/2022 30 12 - 78 U/L Final     Comment:     Specimen collection should occur prior to Sulfasalazine and/or Sulfapyridine administration due to the potential for falsely depressed results  03/04/2022 27 12 - 78 U/L Final     Comment:     Specimen collection should occur prior to Sulfasalazine and/or Sulfapyridine administration due to the potential for falsely depressed results  08/03/2021 25 12 - 78 U/L Final     Comment:     Specimen collection should occur prior to Sulfasalazine and/or Sulfapyridine administration due to the potential for falsely depressed results      05/16/2018 24 9 - 52 U/L Final     Alkaline Phosphatase   Date Value Ref Range Status   04/19/2022 64 46 - 116 U/L Final   03/04/2022 69 46 - 116 U/L Final   08/03/2021 72 46 - 116 U/L Final   05/16/2018 69 43 - 122 U/L Final     Total Protein   Date Value Ref Range Status   05/16/2018 7 3 5 9 - 8 4 G/DL Final     Total Bilirubin   Date Value Ref Range Status   05/16/2018 0 5 <1 3 mg/dL Final     WBC   Date Value Ref Range Status   03/04/2022 11 63 (H) 4 31 - 10 16 Thousand/uL Final   12/15/2021 11 82 (H) 4 31 - 10 16 Thousand/uL Final   08/26/2019 20 10 (H) 4 50 - 11 00 Thousand/uL Final   05/16/2018 11 6 (H) 4 5 - 11 0 K/MCL Final     Hemoglobin   Date Value Ref Range Status   03/04/2022 14 8 12 0 - 17 0 g/dL Final   12/15/2021 14 7 12 0 - 17 0 g/dL Final   08/26/2019 15 7 13 5 - 17 5 g/dL Final   05/16/2018 14 3 13 5 - 17 5 G/DL Final Platelets   Date Value Ref Range Status   03/04/2022 315 149 - 390 Thousands/uL Final   12/15/2021 297 149 - 390 Thousands/uL Final   08/26/2019 258 150 - 450 Thousands/uL Final   05/16/2018 288 150 - 450 K/MCL Final     No results found for: PT, PTT, INR  No results found for: CKMB, DIGOXIN  No results found for: TSH  Cholesterol   Date Value Ref Range Status   05/16/2018 144 <200 mg/dL Final     Comment:            NCEP ATP III      <200           DESIRABLE   200-239     BORDERLINE HIGH   > = 240                HIGH       HDL   Date Value Ref Range Status   05/16/2018 37 (L) >40 mg/dL Final     Comment:            NCEP ATP III       <40                 LOW    > = 60                HIGH       HDL, Direct   Date Value Ref Range Status   04/19/2022 36 (L) >=40 mg/dL Final     Comment:     Specimen collection should occur prior to Metamizole administration due to the potential for falsley depressed results  Triglycerides   Date Value Ref Range Status   04/19/2022 92 See Comment mg/dL Final     Comment:     Triglyceride:     0-9Y            <75mg/dL     10Y-17Y         <90 mg/dL       >=18Y     Normal          <150 mg/dL     Borderline High 150-199 mg/dL     High            200-499 mg/dL        Very High       >499 mg/dL    Specimen collection should occur prior to N-Acetylcysteine or Metamizole administration due to the potential for falsely depressed results  05/16/2018 97 <150 mg/dL Final     Comment:            NCEP ATP III      <150              NORMAL  150-199     BORDERLINE HIGH  200-499                HIGH  = > 500           VERY HIGH        Hemoglobin A1C   Date Value Ref Range Status   03/04/2022 6 1 (H) Normal 3 8-5 6%; PreDiabetic 5 7-6 4%; Diabetic >=6 5%; Glycemic control for adults with diabetes <7 0% % Final   05/16/2018 6 1 (H) <5 7 % Final     Comment:     HEMOGLOBIN A1c VALUES OF 5 7-6 4 PERCENT  INDICATE AN INCREASED RISK OF DEVELOPING  DIABETES MELLITUS   HEMOGLOBIN A1c VALUES  GREATER THAN OR EQUAL TO 6 5 PERCENT ARE  DIAGNOSTIC OF DIABETES MELLITUS  TARGET FOR  DIABETIC CONTROL IS LESS THAN 7 PERCENT  THIS  BORONATE AFFINITY Hb A1c METHOD PROVIDES  ACCURATE ANALYTICAL RESULTS IN THE PRESENCE  OF NEARLY ALL HEMOGLOBIN VARIANTS  Hb F  HIGHER THAN 10 PERCENT OF TOTAL Hb MAY YIELD  FALSELY LOW RESULTS  CONDITIONS THAT SHORTEN  RED CELL SURVIVAL, SUCH AS THE PRESENCE OF  UNSTABLE HEMOGLOBINS LIKE Hb SS, Hb CC AND  Hb SC OR OTHER CAUSES OF HEMOLYTIC ANEMIA MAY  YIELD FALSELY LOW RESULTS  IRON DEFICIENCY  ANEMIA MAY YIELD FALSELY HIGH RESULTS  No results found for: Karen Rosas, GRAMSTAIN, URINECX, WOUNDCULT, BODYFLUIDCUL, MRSACULTURE, INFLUAPCR, INFLUBPCR, RSVPCR, LEGIONELLAUR, CDIFFTOXINB    *-*-*-*-*-*-*-*-*-*-*-*-*-*-*-*-*-*-*-*-*-*-*-*-*-*-*-*-*-*-*-*-*-*-*-*-*-*-*-*-*-*-*-*-*-*-*-*-*-*-*-*-*-*-  RADIOLOGY RESULTS:  US scrotum and testicles    Result Date: 3/22/2022  Impression: Complex moderate hydrocele bilaterally  Bilateral epididymal cysts, more on the left  Workstation performed: [id number]     US abdominal aorta screening aaa    Result Date: 3/18/2022  Impression: 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  No evidence for abdominal aortic aneurysm  Workstation performed: [id number]       *-*-*-*-*-*-*-*-*-*-*-*-*-*-*-*-*-*-*-*-*-*-*-*-*-*-*-*-*-*-*-*-*-*-*-*-*-*-*-*-*-*-*-*-*-*-*-*-*-*-*-*-*-*-  LAST ECHOCARDIOGRAM AND OTHER CARDIOLOGY RESULTS:  No results found for this or any previous visit  No results found for this or any previous visit  No results found for this or any previous visit  No results found for this or any previous visit  *-*-*-*-*-*-*-*-*-*-*-*-*-*-*-*-*-*-*-*-*-*-*-*-*-*-*-*-*-*-*-*-*-*-*-*-*-*-*-*-*-*-*-*-*-*-*-*-*-*-*-*-*-*-  RADIOLOGY RESULTS:  US scrotum and testicles    Result Date: 3/22/2022  Impression: Complex moderate hydrocele bilaterally  Bilateral epididymal cysts, more on the left   Workstation performed: [id number]     US abdominal aorta screening aaa    Result Date: 3/18/2022  Impression: 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  No evidence for abdominal aortic aneurysm  Workstation performed: [id number]       *-*-*-*-*-*-*-*-*-*-*-*-*-*-*-*-*-*-*-*-*-*-*-*-*-*-*-*-*-*-*-*-*-*-*-*-*-*-*-*-*-*-*-*-*-*-*-*-*-*-*-*-*-*-  ECHOCARDIOGRAM AND OTHER CARDIOLOGY RESULTS:  No results found for this or any previous visit  No results found for this or any previous visit  No results found for this or any previous visit  No results found for this or any previous visit         *-*-*-*-*-*-*-*-*-*-*-*-*-*-*-*-*-*-*-*-*-*-*-*-*-*-*-*-*-*-*-*-*-*-*-*-*-*-*-*-*-*-*-*-*-*-*-*-*-*-*-*-*-*-  SIGNATURES:   [unfilled]   Alexys Bhatt MD     CC:   MD La Villarreal MD

## 2022-05-27 NOTE — ASSESSMENT & PLAN NOTE
Mr  Name has severely uncontrolled hypertension despite being on multiple antihypertensive  Fortunately he denies any symptoms of target organ damage  His renal function is stable  His thyroid function is normal   His workup for secondary causes of hypertension thus far has been negative  I suspect his uncontrolled hypertension is more due to untreated sleep apnea  His medications have been changed recently however his blood pressure remains markedly elevated  I am making the following changes today:  -- STOP NADOLOL  -- BEGIN CARVEDILOL 12 5 MG TWICE DAILY  -- TAKE PROCARDIA XL 60 MG IN THE MORNING AND 30 MG IN THE EVENING   -- CONTINUE TAKING LISINOPRIL 40 MG DAILY IN THE MORNING   -- CONTINUE TAKING CHLORTHALIDONE 50 MG DAILY IN THE MORNING  Further other instructions are given:  -- is advised to have blood work including CMP and NT proBNP level in 2-3 weeks  -- we are arranging for an echocardiogram to assess cardiac structure and function and rule out hypertensive heart disease and diastolic dysfunction  -- he is advised to cut down intake of salt in his food  I would like to see him back after echocardiogram to consider further therapy if necessary  He is advised to call us if he develops any concerning symptoms such as increasing fatigue headache lightheadedness dizziness or experiences any passing out or near passing out episodes after making the medication changes  we are changing patient's nadolol to carvedilol  He is advised to take carvedilol 12 5 mg twice daily  He is encouraged t try to lose weight through dietary changes and increasing physical activity

## 2022-05-27 NOTE — PATIENT INSTRUCTIONS
CARDIOLOGY ASSESSMENT & PLAN:   Diagnosis ICD-10-CM Associated Orders   1  Severe uncontrolled hypertension  I10 carvedilol (COREG) 12 5 mg tablet     NIFEdipine (PROCARDIA XL) 60 mg 24 hr tablet     NIFEdipine (PROCARDIA XL) 30 mg 24 hr tablet     Echo complete w/ contrast if indicated     NT-BNP PRO     Basic metabolic panel   2  Essential hypertension  I10 POCT ECG   3  Hypertensive heart disease without heart failure  I11 9 Echo complete w/ contrast if indicated     NT-BNP PRO     Basic metabolic panel   4  Low HDL (under 40)  E78 6    5  Suspected sleep apnea  R29 818 Echo complete w/ contrast if indicated   6  Class 3 severe obesity due to excess calories without serious comorbidity with body mass index (BMI) of 40 0 to 44 9 in adult [facility])  E66 01     Z68 41      Severe uncontrolled hypertension  Mr  Name has severely uncontrolled hypertension despite being on multiple antihypertensive  Fortunately he denies any symptoms of target organ damage  His renal function is stable  His thyroid function is normal   His workup for secondary causes of hypertension thus far has been negative  I suspect his uncontrolled hypertension is more due to untreated sleep apnea  His medications have been changed recently however his blood pressure remains markedly elevated  I am making the following changes today:  -- STOP NADOLOL  -- BEGIN CARVEDILOL 12 5 MG TWICE DAILY  -- TAKE PROCARDIA XL 60 MG IN THE MORNING AND 30 MG IN THE EVENING   -- CONTINUE TAKING LISINOPRIL 40 MG DAILY IN THE MORNING   -- CONTINUE TAKING CHLORTHALIDONE 50 MG DAILY IN THE MORNING  Further other instructions are given:  -- is advised to have blood work including CMP and NT proBNP level in 2-3 weeks  -- we are arranging for an echocardiogram to assess cardiac structure and function and rule out hypertensive heart disease and diastolic dysfunction  -- he is advised to cut down intake of salt in his food        I would like to see him back after echocardiogram to consider further therapy if necessary  He is advised to call us if he develops any concerning symptoms such as increasing fatigue headache lightheadedness dizziness or experiences any passing out or near passing out episodes after making the medication changes  we are changing patient's nadolol to carvedilol  He is advised to take carvedilol 12 5 mg twice daily  He is encouraged t try to lose weight through dietary changes and increasing physical activity

## 2022-05-31 ENCOUNTER — CLINICAL SUPPORT (OUTPATIENT)
Dept: FAMILY MEDICINE CLINIC | Facility: CLINIC | Age: 67
End: 2022-05-31

## 2022-05-31 ENCOUNTER — TELEPHONE (OUTPATIENT)
Dept: FAMILY MEDICINE CLINIC | Facility: CLINIC | Age: 67
End: 2022-05-31

## 2022-05-31 VITALS — DIASTOLIC BLOOD PRESSURE: 80 MMHG | SYSTOLIC BLOOD PRESSURE: 172 MMHG

## 2022-05-31 DIAGNOSIS — I10 RESISTANT HYPERTENSION: Primary | ICD-10-CM

## 2022-05-31 NOTE — PROGRESS NOTES
Pt came in for a nurse visit with elevated bp  States he is asymptomatic  Preceptor Zeinab Lozano came to go over medications with pt since pt was seen recently by cardiology & they made changes to his medications  When preceptor Millie Avitia was asking pt which medications he was taking and at what times pt then said in [address] "I am getting pissed off, I'm going to hell leave me alone I'm walking out of here"  Pt did not want to answer Dr Sinan Moon questions about the medications, continued to curse, and walked out of the office

## 2022-05-31 NOTE — TELEPHONE ENCOUNTER
Called patient and discussed HTN regimen over the phone  Patient states he checked BP at home this afternoon and recorded at 150/78 which shows significant improvement from 172/80 in the office  Reviewed medications with patient, and confirmed he is taking all of them as prescribed  Pt endorses appreciation for calling him this afternoon       Plan:  Resistant HTN  BP goal <150/90 per JNC-8 criteria  Reviewed cardiology note and recent medication changes  Continue Carvedilol 12 5 mg bid, Procardia-XL 60 mg Am and 30 mg HS  Continue Lisinopril 40 mg HS and Chlorthalidone 50 mg Am  Adhere to a low salt diet and exercise   He is aware of sleep medicine consult scheduled 06/01/22  Pt will also complete renal US on 06/02/22  Will see him for follow up on 06/13/22

## 2022-06-01 ENCOUNTER — OFFICE VISIT (OUTPATIENT)
Dept: SLEEP CENTER | Facility: CLINIC | Age: 67
End: 2022-06-01
Payer: COMMERCIAL

## 2022-06-01 VITALS
HEART RATE: 60 BPM | HEIGHT: 69 IN | BODY MASS INDEX: 43.4 KG/M2 | WEIGHT: 293 LBS | DIASTOLIC BLOOD PRESSURE: 81 MMHG | SYSTOLIC BLOOD PRESSURE: 174 MMHG

## 2022-06-01 DIAGNOSIS — I10 RESISTANT HYPERTENSION: ICD-10-CM

## 2022-06-01 DIAGNOSIS — G47.33 OSA (OBSTRUCTIVE SLEEP APNEA): Primary | ICD-10-CM

## 2022-06-01 DIAGNOSIS — E66.01 CLASS 3 SEVERE OBESITY DUE TO EXCESS CALORIES WITHOUT SERIOUS COMORBIDITY WITH BODY MASS INDEX (BMI) OF 40.0 TO 44.9 IN ADULT (HCC): ICD-10-CM

## 2022-06-01 PROBLEM — I1A.0 RESISTANT HYPERTENSION: Status: ACTIVE | Noted: 2018-07-19

## 2022-06-01 PROCEDURE — 99204 OFFICE O/P NEW MOD 45 MIN: CPT | Performed by: INTERNAL MEDICINE

## 2022-06-01 NOTE — PROGRESS NOTES
Sleep Consultation   Tod Wilburn 78 y o  male MRN: [id number]      Reason for consultation: ALEK    Requesting physician: Dr Tuan Flores     Assessment/Plan  1  ALEK (obstructive sleep apnea)  Assessment & Plan:  · He has high pretest for ALEK given BMI 43 27, hypertension, morbid obesity, snoring  · I ordered in-lab diagnostic study  · We discussed with him and his daughter importance of diagnosing and treating sleep apnea in details and the consequences of untreated sleep apnea on his cardiovascular risk including his uncontrolled hypertension    Orders:  -     Diagnostic Sleep Study; Future; Expected date: 06/02/2022    2  Resistant hypertension  -     Ambulatory Referral to Sleep Medicine  -     Diagnostic Sleep Study; Future; Expected date: 06/02/2022    3  Class 3 severe obesity due to excess calories without serious comorbidity with body mass index (BMI) of 40 0 to 44 9 in adult [facility])  Assessment & Plan:  Noted, BMI 43 27             History of Present Illness   HPI:  Tod Wilburn is a 78 y o  male with PMHx as below who comes in for evaluation of obstructive sleep apnea, referred to us by his family doctor for suspected sleep apnea with past history of severe uncontrolled hypertension, non-CHF hypertensive heart disease, and Class III obesity  He was accompanied by his adult daughter who served as his   He has never had a sleep study before and has never been diagnosed with sleep apnea, nor has he ever used a CPAP machine  He attests to snoring, but his daughter says she does not hear him stop breathing  He does not report fatigue and sleeps 10 PM - 5 AM   He attests to having one headache per week which is a 5/10 pain level and resolves with Tylenol  He has never had a heart attack or a stroke before, and he takes all of his medications as prescribed  He does have a family history of CVD, hypertension, and stroke             Review of Systems        Genitourinary none   Cardiology none   Gastrointestinal none   Neurology awaken with headache   Constitutional none   Integumentary none   Psychiatry none   Musculoskeletal none   Pulmonary snoring   ENT none   Endocrine none   Hematological none               Historical Information   Past Medical History:   Diagnosis Date    Hypertension     Prediabetes      History reviewed  No pertinent surgical history  Family History   Problem Relation Age of Onset    Hypertension Mother     Coronary artery disease Mother     Hypertension Father     Coronary artery disease Father     Hypertension Sister     Coronary artery disease Sister     Hypertension Brother     Coronary artery disease Brother     Hypertension Paternal Uncle     Stroke Paternal Uncle      Social History     Socioeconomic History    Marital status: /Civil Union     Spouse name: Not on file    Number of children: Not on file    Years of education: Not on file    Highest education level: Not on file   Occupational History    Not on file   Tobacco Use    Smoking status: Never Smoker    Smokeless tobacco: Never Used    Tobacco comment: only tried one cigarette when teenager   Substance and Sexual Activity    Alcohol use: Never    Drug use: Never    Sexual activity: Not Currently   Other Topics Concern    Not on file   Social History Narrative    Not on file     Social Determinants of Health     Financial Resource Strain: Low Risk     Difficulty of Paying Living Expenses: Not hard at all   Food Insecurity: No Food Insecurity    Worried About Running Out of Food in the Last Year: Never true    [address] in the Last Year: Never true   Transportation Needs: No Transportation Needs    Lack of Transportation (Medical): No    Lack of Transportation (Non-Medical):  No   Physical Activity: Not on file   Stress: Not on file   Social Connections: Not on file   Intimate Partner Violence: Not on file   Housing Stability: Not on file         Meds/Allergies Allergies   Allergen Reactions    Amlodipine Other (See Comments)     Nightmares and difficulty sleeping       Home medications:  Prior to Admission medications    Medication Sig Start Date End Date Taking? Authorizing Provider   carvedilol (COREG) 12 5 mg tablet Take 1 tablet (12 5 mg total) by mouth 2 (two) times a day with meals 5/27/22  Yes Jayne Keene MD   chlorthalidone (HYGROTEN) 50 MG tablet Take 1 tablet (50 mg total) by mouth daily 4/14/22  Yes Morenita Manzano MD   lisinopril (ZESTRIL) 40 mg tablet Take 1 tablet (40 mg total) by mouth daily at bedtime 3/3/22  Yes Morenita Manzano MD   NIFEdipine (PROCARDIA XL) 30 mg 24 hr tablet Take 1 tablet (30 mg total) by mouth every evening 5/27/22  Yes Jayne Keene MD   NIFEdipine (PROCARDIA XL) 60 mg 24 hr tablet Take 1 tablet (60 mg total) by mouth every morning 5/27/22  Yes Jayne Keene MD   rosuvastatin (CRESTOR) 10 MG tablet Take 1 tablet (10 mg total) by mouth daily at bedtime 5/16/22  Yes Morenita Manzano MD   nadolol (CORGARD) 80 MG tablet TAKE ONE TABLET BY MOUTH ONCE DAILY 5/24/22 5/27/22  Morenita Manzano MD       Vitals:   Blood pressure (!) 174/81, pulse 60, height 5' 9" (1 753 m), weight 133 kg (293 lb)  ,  Body mass index is 43 27 kg/m²  Neck Circumference: 18 5    Physical Exam  General:  Awake alert and oriented x 3, conversant without conversational dyspnea, NAD, normal affect  HEENT:   Sclera noninjected, nonicteric OU, Nares patent,  no craniofacial abnormalities, Mucous membranes, moist, no oral lesions, normal dentition  NECK:  Trachea midline, no accessory muscle use, no stridor,  JVP not elevated  CARDIAC: Reg, single s1/S2, no m/r/g  PULM: CTA bilaterally no wheezing, rhonchi or rales  ABD: Soft nontender, nondistended, no rebound, no rigidity, no guarding  EXT: No cyanosis, no clubbing, no edema, normal capillary refill  NEURO: no focal neurologic deficits, AAOx3, moving all extremities appropriately    Labs:  I have personally reviewed pertinent lab results  , ABG: No results found for: PHART, BYY2KKE, PO2ART, WSA2RRT, Y9KKULLT, BEART, SOURCE, BNP: No results found for: BNP, CBC: No results found for: WBC, HGB, HCT, MCV, PLT, ADJUSTEDWBC, MCH, MCHC, RDW, MPV, NRBC, CMP: No results found for: SODIUM, K, CL, CO2, ANIONGAP, BUN, CREATININE, GLUCOSE, CALCIUM, AST, ALT, ALKPHOS, PROT, BILITOT, EGFR, PT/INR: No results found for: PT, INR, Troponin: No results found for: TROPONINI  Lab Results   Component Value Date    WBC 11 63 (H) 03/04/2022    HGB 14 8 03/04/2022    HCT 47 4 03/04/2022    MCV 85 03/04/2022     03/04/2022      Lab Results   Component Value Date    GLUCOSE 103 (H) 05/16/2018    CALCIUM 9 3 04/19/2022     05/16/2018    K 3 9 04/19/2022    CO2 29 04/19/2022     04/19/2022    BUN 15 04/19/2022    CREATININE 0 95 04/19/2022     No results found for: IRON, TIBC, FERRITIN  No results found for: [id number]  No results found for: Lisandra Mosquera MD  [address] [facility]       Portions of the record may have been created with voice recognition software  Occasional wrong word or "sound a like" substitutions may have occurred due to the inherent limitations of voice recognition software  Read the chart carefully and recognize, using context, where substitutions have occurred

## 2022-06-01 NOTE — PROGRESS NOTES
Review of Systems      Genitourinary none   Cardiology none   Gastrointestinal none   Neurology awaken with headache   Constitutional none   Integumentary none   Psychiatry none   Musculoskeletal none   Pulmonary snoring   ENT none   Endocrine none   Hematological none

## 2022-06-01 NOTE — PROGRESS NOTES
Mr Jair Ndiaye is a 78year old male referred to us by his family doctor for suspected sleep apnea with past history of severe uncontrolled hypertension, non-CHF hypertensive heart disease, and Class III obesity  He was accompanied by his adult daughter who served as his   He has never had a sleep study before and has never been diagnosed with sleep apnea, nor has he ever used a CPAP machine  He attests to snoring, but his daughter says she does not hear him stop breathing  He does not report fatigue and sleeps 10 PM - 5 AM   He attests to having one headache per week which is a 5/10 pain level and resolves with Tylenol  He has never had a heart attack or a stroke before, and he takes all of his medications as prescribed  He does have a family history of CVD, hypertension, and stroke

## 2022-06-01 NOTE — ASSESSMENT & PLAN NOTE
· He has high pretest for ALEK given BMI 43 27, hypertension, morbid obesity, snoring  · I ordered in-lab diagnostic study  · We discussed with him and his daughter importance of diagnosing and treating sleep apnea in details and the consequences of untreated sleep apnea on his cardiovascular risk including his uncontrolled hypertension

## 2022-06-01 NOTE — PATIENT INSTRUCTIONS
Sleep Apnea   AMBULATORY CARE:   Sleep apnea  is a condition that causes you to stop breathing often during sleep  Types of sleep apnea:   Obstructive sleep apnea (ALEK)  is the most common kind  The muscles and tissues around your throat relax and block air from passing through  Obesity, use of alcohol or cigarettes, or a family history are common causes  ALEK may increase your risk for complications after surgery  Central sleep apnea (CSA)  means your brain does not send signals to the muscles that control breathing  You do not take a breath even though your airway is open  Common causes include medical conditions such as heart failure, being older than 40, or use of opioids  Complex (or mixed) sleep apnea  means you have both obstructive and central sleep apnea  Common signs and symptoms:   Loud snoring or long pauses in breathing    Feeling sleepy, slow, and tired during the day    Snorting, gasping, or choking while you sleep, and waking up suddenly because of these    Feeling irritable during the day    Dry mouth or a headache in the mornings    Heavy night sweating    A hard time thinking, remembering things, or focusing on your tasks the following day    Call your local emergency number (911 in the [address] [facility]) if:   You have chest pain or trouble breathing  Call your doctor if:   You have new or worsening signs or symptoms  You have questions or concerns about your condition or care  Treatment  depends on the kind of apnea you have  A mouth device  may be needed if you have mild sleep apnea  These are designed to keep your throat open  Ask your dentist or healthcare provider about the best mouth device for you  A machine  may be used to help you get more air during sleep  A mask may be placed over your nose and mouth, or just your nose  The mask is hooked to the machine  You will get air through the mask      A continuous positive airway pressure (CPAP) machine  is used to keep your airway open during sleep  The machine blows a gentle stream of air into the mask when you breathe  This helps keep your airway open so you can breathe more regularly  Extra oxygen may be given through the machine  A bilevel positive airway pressure (BiPAP) machine  gives air but lowers the pressure when you breathe out  An adaptive servo-ventilator (ASV)  is a machine that learns your usual breathing pattern  Then, it uses pressure to give you air and prevent stops in your breathing  Surgery  to expand your airway or remove extra tissues may be needed  Surgery is usually only considered if other treatments do not work  Manage or prevent sleep apnea:   Reach and maintain a healthy weight  Ask your healthcare provider what a healthy weight is for you  Ask him or her to help you create a safe weight loss plan if you are overweight  Even a small goal of a 10% weight loss can improve your symptoms  Do not smoke  Nicotine and other chemicals in cigarettes and cigars can cause lung damage  Ask your healthcare provider for information if you currently smoke and need help to quit  E-cigarettes or smokeless tobacco still contain nicotine  Talk to your healthcare provider before you use these products  Do not drink alcohol or take sedative medicine before you go to sleep  Alcohol and sedatives can relax the muscles and tissues around your throat  This can block the airflow to your lungs  Sleep on your side or use pillows designed to prevent sleep apnea  This prevents your tongue or other tissues from blocking your throat  You can also raise the head of your bed  Follow up with your doctor or specialist as directed: You may need to have blood tests during your follow-up visits  Work with your provider to find the right breathing support equipment and settings for you  Write down your questions so you remember to ask them during your visits    © Copyright [employer] 2022 Information is for End User's use only and may not be sold, redistributed or otherwise used for commercial purposes  All illustrations and images included in CareNotes® are the copyrighted property of A D A M , Inc  or Orlin Mosley  The above information is an  only  It is not intended as medical advice for individual conditions or treatments  Talk to your doctor, nurse or pharmacist before following any medical regimen to see if it is safe and effective for you

## 2022-06-02 ENCOUNTER — HOSPITAL ENCOUNTER (OUTPATIENT)
Dept: NON INVASIVE DIAGNOSTICS | Facility: HOSPITAL | Age: 67
Discharge: HOME | End: 2022-06-02
Payer: COMMERCIAL

## 2022-06-02 DIAGNOSIS — I10 RESISTANT HYPERTENSION: ICD-10-CM

## 2022-06-02 PROCEDURE — 93975 VASCULAR STUDY: CPT

## 2022-06-02 PROCEDURE — 93975 VASCULAR STUDY: CPT | Performed by: SURGERY

## 2022-06-09 ENCOUNTER — HOSPITAL ENCOUNTER (OUTPATIENT)
Dept: NON INVASIVE DIAGNOSTICS | Facility: CLINIC | Age: 67
Discharge: HOME | End: 2022-06-09
Payer: COMMERCIAL

## 2022-06-09 VITALS
BODY MASS INDEX: 43.4 KG/M2 | WEIGHT: 293 LBS | HEART RATE: 55 BPM | DIASTOLIC BLOOD PRESSURE: 81 MMHG | SYSTOLIC BLOOD PRESSURE: 174 MMHG | HEIGHT: 69 IN

## 2022-06-09 DIAGNOSIS — I11.9 HYPERTENSIVE HEART DISEASE WITHOUT HEART FAILURE: ICD-10-CM

## 2022-06-09 DIAGNOSIS — I10 SEVERE UNCONTROLLED HYPERTENSION: ICD-10-CM

## 2022-06-09 DIAGNOSIS — R29.818 SUSPECTED SLEEP APNEA: ICD-10-CM

## 2022-06-09 LAB
AORTIC ROOT: 3.9 CM
APICAL FOUR CHAMBER EJECTION FRACTION: 59 %
ASCENDING AORTA: 3.6 CM
E WAVE DECELERATION TIME: 284 MS
FRACTIONAL SHORTENING: 30 % (ref 28–44)
INTERVENTRICULAR SEPTUM IN DIASTOLE (PARASTERNAL SHORT AXIS VIEW): 1.7 CM
INTERVENTRICULAR SEPTUM: 1.7 CM (ref 0.6–1.1)
LAAS-AP4: 25.7 CM2
LEFT ATRIUM SIZE: 4 CM
LEFT INTERNAL DIMENSION IN SYSTOLE: 3.8 CM (ref 2.1–4)
LEFT VENTRICULAR INTERNAL DIMENSION IN DIASTOLE: 5.4 CM (ref 3.5–6)
LEFT VENTRICULAR POSTERIOR WALL IN END DIASTOLE: 0.9 CM
LEFT VENTRICULAR STROKE VOLUME: 78 ML
LVSV (TEICH): 78 ML
MV E'TISSUE VEL-SEP: 8 CM/S
MV PEAK A VEL: 0.44 M/S
MV PEAK E VEL: 73 CM/S
MV STENOSIS PRESSURE HALF TIME: 82 MS
MV VALVE AREA P 1/2 METHOD: 2.68 CM2
RIGHT ATRIUM AREA SYSTOLE A4C: 20.2 CM2
RIGHT VENTRICLE ID DIMENSION: 4 CM
SL CV LV EF: 60
SL CV PED ECHO LEFT VENTRICLE DIASTOLIC VOLUME (MOD BIPLANE) 2D: 140 ML
SL CV PED ECHO LEFT VENTRICLE SYSTOLIC VOLUME (MOD BIPLANE) 2D: 62 ML

## 2022-06-09 PROCEDURE — 93306 TTE W/DOPPLER COMPLETE: CPT

## 2022-06-09 PROCEDURE — 93306 TTE W/DOPPLER COMPLETE: CPT | Performed by: INTERNAL MEDICINE

## 2022-06-12 PROBLEM — E66.813 CLASS 3 OBESITY: Status: ACTIVE | Noted: 2022-06-12

## 2022-06-12 PROBLEM — E66.01 CLASS 3 SEVERE OBESITY DUE TO EXCESS CALORIES WITH BODY MASS INDEX (BMI) OF 40.0 TO 44.9 IN ADULT (HCC): Status: RESOLVED | Noted: 2019-02-28 | Resolved: 2022-06-12

## 2022-06-12 PROBLEM — Z12.11 ENCOUNTER FOR SCREENING COLONOSCOPY: Status: RESOLVED | Noted: 2019-03-14 | Resolved: 2022-06-12

## 2022-06-12 PROBLEM — E66.813 CLASS 3 SEVERE OBESITY DUE TO EXCESS CALORIES WITH BODY MASS INDEX (BMI) OF 40.0 TO 44.9 IN ADULT (HCC): Status: RESOLVED | Noted: 2019-02-28 | Resolved: 2022-06-12

## 2022-06-12 PROBLEM — E66.01 CLASS 3 OBESITY (HCC): Status: ACTIVE | Noted: 2022-06-12

## 2022-06-12 PROBLEM — E66.9 CLASS 3 OBESITY: Status: ACTIVE | Noted: 2022-06-12

## 2022-06-12 NOTE — ASSESSMENT & PLAN NOTE
BP goal <150/90 per JNC-8 criteria, asymptomatic  Initial BP in the office today: 212/98  Recheck BP in the office: 210/90  Reviewed cardiology note and recent medication changes  Given Bp not at goal and Hr is normal (84 bmp) increase Carvedilol from 12 5 mg bid-->25 mg bid  Continue Procardia-XL 60 mg Am and 30 mg HS  Continue Lisinopril 40 mg HS and Chlorthalidone 50 mg Am  Adhere to a low salt diet and exercise   Pt completed sleep medicine consult and there is high suspicion for sleep apnea which may be the underlying cause for his resistant HTN  He is also scheduled to see bariatric surgery to discuss wt loss on 07/20/22  F/u with cardio on 07/01 as scheduled  Return in 1 week to the office for BP recheck with nurse

## 2022-06-12 NOTE — PROGRESS NOTES
Assessment/Plan:    Resistant hypertension  BP goal <150/90 per JNC-8 criteria, asymptomatic  Initial BP in the office today: 212/98  Recheck BP in the office: 210/90  Reviewed cardiology note and recent medication changes  Given Bp not at goal and Hr is normal (84 bmp) increase Carvedilol from 12 5 mg bid-->25 mg bid  Continue Procardia-XL 60 mg Am and 30 mg HS  Continue Lisinopril 40 mg HS and Chlorthalidone 50 mg Am  Adhere to a low salt diet and exercise   Pt completed sleep medicine consult and there is high suspicion for sleep apnea which may be the underlying cause for his resistant HTN  He is also scheduled to see bariatric surgery to discuss wt loss on 07/20/22  F/u with cardio on 07/01 as scheduled  Return in 1 week to the office for BP recheck with nurse       Diagnoses and all orders for this visit:    Resistant hypertension  -     carvedilol (COREG) 25 mg tablet; Take 1 tablet (25 mg total) by mouth 2 (two) times a day with meals          Subjective:      Patient ID: Bunny Willis is a 78 y o  male  HPI     Patient presents today to the office for HTN f/u visit       Endorses compliance with his BP medications as prescribed  Pt mentions he is otherwise feeling well and endorses no immediate concerns  Reports he has last checked Bp at home on 06/10 recorded at 134/75  No other readings available  Currently, pt denies any CV sx today  Upcoming visits:   Sleep study scheduled on 09/20/22  Cardiology appt scheduled on 07/01/22  Bariatrics appt scheduled on 07/20/22    The following portions of the patient's history were reviewed and updated as appropriate: allergies, current medications, past family history, past medical history, past social history, past surgical history and problem list     Review of Systems   Constitutional: Negative for chills and fever  Eyes: Negative for visual disturbance  Respiratory: Negative  Negative for cough and shortness of breath      Cardiovascular: Negative for chest pain and leg swelling  Gastrointestinal: Negative  Negative for abdominal pain, blood in stool, constipation, diarrhea, nausea and vomiting  Genitourinary: Negative  Negative for dysuria and hematuria  Skin: Negative for rash  Neurological: Negative for dizziness and headaches  Psychiatric/Behavioral: The patient is not nervous/anxious  Objective:    BP (!) 212/98 (BP Location: Left arm, Patient Position: Sitting, Cuff Size: Adult)   Pulse 84   Temp 97 5 °F (36 4 °C) (Temporal)   Resp 18   Ht 5' 9" (1 753 m)   Wt 134 kg (295 lb 14 4 oz)   SpO2 98%   BMI 43 70 kg/m²      Physical Exam  Vitals and nursing note reviewed  Constitutional:       General: He is not in acute distress  Appearance: Normal appearance  He is well-developed  He is obese  He is not ill-appearing, toxic-appearing or diaphoretic  HENT:      Head: Normocephalic and atraumatic  Nose: Nose normal    Eyes:      General:         Right eye: No discharge  Left eye: No discharge  Extraocular Movements: Extraocular movements intact  Conjunctiva/sclera: Conjunctivae normal    Cardiovascular:      Rate and Rhythm: Normal rate and regular rhythm  Heart sounds: Normal heart sounds  Pulmonary:      Effort: Pulmonary effort is normal  No respiratory distress  Breath sounds: Normal breath sounds  Abdominal:      General: Bowel sounds are normal       Palpations: Abdomen is soft  Tenderness: There is no abdominal tenderness  Musculoskeletal:         General: No tenderness  Normal range of motion  Cervical back: Normal range of motion and neck supple  Right lower leg: No edema  Left lower leg: No edema  Skin:     General: Skin is warm  Findings: No rash  Neurological:      Mental Status: He is alert and oriented to person, place, and time     Psychiatric:         Mood and Affect: Mood normal          Behavior: Behavior normal          Thought Content:  Thought content normal          Judgment: Judgment normal

## 2022-06-13 ENCOUNTER — OFFICE VISIT (OUTPATIENT)
Dept: FAMILY MEDICINE CLINIC | Facility: CLINIC | Age: 67
End: 2022-06-13

## 2022-06-13 VITALS
DIASTOLIC BLOOD PRESSURE: 98 MMHG | HEART RATE: 84 BPM | RESPIRATION RATE: 18 BRPM | BODY MASS INDEX: 43.83 KG/M2 | SYSTOLIC BLOOD PRESSURE: 212 MMHG | TEMPERATURE: 97.5 F | OXYGEN SATURATION: 98 % | WEIGHT: 295.9 LBS | HEIGHT: 69 IN

## 2022-06-13 DIAGNOSIS — I10 RESISTANT HYPERTENSION: Primary | ICD-10-CM

## 2022-06-13 PROCEDURE — 99213 OFFICE O/P EST LOW 20 MIN: CPT | Performed by: FAMILY MEDICINE

## 2022-06-13 RX ORDER — CARVEDILOL 25 MG/1
25 TABLET ORAL 2 TIMES DAILY WITH MEALS
Qty: 180 TABLET | Refills: 0 | Status: SHIPPED | OUTPATIENT
Start: 2022-06-13 | End: 2022-07-01 | Stop reason: SDUPTHER

## 2022-06-13 NOTE — PATIENT INSTRUCTIONS
Plan de alimentación con "enfoque dietético para detener la hipertensión (DASH, por clover siglas en inglés)   LO QUE NECESITA SABER:   El plan de alimentación DASH está diseñado para ayudar a prevenir o disminuir la hipertensión  También puede ayudar a bajar el colesterol samantha (colesterol LDL) y disminuir lares riesgo de enfermedad cardíaca  El plan es bajo en sodio, azúcar, grasas dañinas, y grasas en lares totalidad  Es alto en potasio, calcio, magnesio y [place]  Estos nutrientes se agregan al consumir más frutas, vegetales y granos enteros  Con el plan de alimentación DASH, usted necesita consumir un número específico de porciones de cada sydnie de alimentos  [place] le ayudará a consumir las cantidades suficientes de ciertos nutrientes y limitar otros  La cantidad de porciones que usted debe comer depende de la cantidad de calorías que usted necesita  Lares dietista puede ayudarlo a crear planes de comidas con la cantidad [place] de porciones para cada sydnie de alimentos  INSTRUCCIONES SOBRE EL JULIET HOSPITALARIA:   Lo que necesita saber acerca del sodio: Lares dietista le indicará la cantidad de sodio que usted debe consumir a diario  La gente que tiene la presión arterial juliet debe consumir de 1,500 a 2,300 mg de sodio al día giana kandis  Dion cucharadita (cdta) de sal tiene 2,300 mg de sodio  [place] puede parecer giana dion meta difícil, kelin pequeños cambios en los alimentos que usted consume pueden hacer dion gran diferencia  Lares médico o dietista puede ayudarlo a crear un plan alimenticio que cumpla lares límite de sodio  Olivia las etiquetas de los alimentos  Las etiquetas pueden ayudarle a escoger alimentos bajos en sodio  La cantidad de sodio está incluida en miligramos (mg)  La columna del porcentaje de valor diario indica la cantidad de necesidades diarias satisfechas con 1 porción del alimento para cada nutriente en la lista  Escoja alimentos que tengan menos de 5% del porcentaje diario de [place]   Estos alimentos se consideran bajos en sodio  Los alimentos que tienen 20% o más del porcentaje diario de sodio se consideran alimentos altos en sodio  Evite alimentos que tengan más de 300 mg de sodio por porción  Escoja alimentos Jewel Finely diga que son bajos en sodio, con sodio reducido, o sin sal agregada  Limite la sal agregada  No sale la comida en la butt si se añade sal al cocinar  Use hierbas y condimentos, giana cebollas, ajo y especias sin sal para agregar sabor  Use jugo de lima, ulisses o vinagre para agregar un sabor ácido  Use chiles picantes o dion cantidad pequeña de salsa picante para agregar un sabor picante  Limite los alimentos con alto contenido de sal agregada, giana los siguientes:    Condimentos hechos con sal, giana sal de ajo, sal de apio, sal de cebolla, sal condimentada, suavizantes para haile, y glutamato de monosodio (MSG, por clover siglas en inglés)    Sopa Miso y mezclas para sopa enlatadas o secas    Salsa de soya regular, salsa de [address], [address] [place], salsa para [facility], [address] [facility], y la mayoría de las vinagres con sabor    Alimentos para merendar, giana yvette tostadas, palomitas de [place], pretzels, piel de cerdo, galletas de soda [facility], y nueces [employer], giana cenas, entradas, vegetales en salsa, y haile [employer] sustitutos para la sal  Pregúntele a lares médico si es posible usar sustitutos de la sal  Algunos sustitutos de la sal vienen con ingredientes que pueden ser dañinos si usted tiene ciertos padecimientos médicos  Escoja los alimentos cuidadosamente cuando sale a comer a restaurantes: las comidas de los restaurantes, sobre todo restaurantes de comida rápida, vic siempre son altas en sodio  Algunos restaurantes ofrecen información nutricional que indica la cantidad de sodio en clover alimentos   Pida que preparen clover comidas con menos sal o sin sal     Lo que necesita saber acerca de las grasas: Las grasas insaturadas y los ácidos grasos omega-3 son ejemplos de grasas saludables  Las grasas no saludables incluyen las grasas saturadas y las grasas trans  Incluya grasas saludables, giana las siguientes:     Aceites de cocina, giana el de soja, canola, humphrey o girasol    Pescados grasos, giana el salmón, el atún, la caballa o las jennifer    Aceite de linaza o linaza molida    ½ taza de frijoles cocidos, giana frijoles negros, frijoles rojos o frijoles pintos    1½ onzas de gavin secos bajos en sodio, giana almendras o nueces    [place] de maní baja en azúcar y sodio    [place], giana las de chía o girasol       Limite o no consuma grasas poco saludables, giana los siguientes:     Alimentos que contienen grasa de origen animal, giana las haile grasas, la [place], la [place] y la crema    Agia [place]kla, margarina en jalen, aceite de anderson y aceite de laine     Aderezo de ensalada completo o cremoso    Sopa cremosa    Galletas, yvette fritas y productos de panadería elaborados con margarina o manteca    Alimentos fritos con grasas poco saludables    Salsa de carne y salsas, giana la [place] o la de queso    Lo que necesita saber acerca de los carbohidratos: Todos los carbohidratos se descomponen en azúcar  Los carbohidratos complejos contienen más fibra que los simples  [place] significa que los carbohidratos complejos entran en el torrente sanguíneo más lentamente y causan menos picos de azúcar en la daniel  Intenta incluir más carbohidratos complejos y menos carbohidratos simples    Incluya carbohidratos complejos, giana los siguientes:     1 tajada de pan integral    1 onza de cereal seco que no contenga azúcar añadida    ½ taza de jimmy cocida    2 onzas de pasta integral cocida    ½ taza de arroz integral cocido    Limite o no consuma carbohidratos simples, giana los siguientes:     Productos horneados, giana rosquillas, pasteles y galletas    Mezclas para pan de maíz y galletas    Arroz reinoso y mezclas de pasta, giana los [place] con queso de caja    Cereales instantáneos y fríos que contienen azúcar    Jorge Vences y helado que contienen azúcar    Condimentos, gaina el ketchup    Bebidas con alto contenido en azúcar, giana refrescos, limonadas y jugos de frutas    Lo que usted necesita saber sobre las verduras y las frutas: Las verduras y las frutas pueden ser frescas, congeladas o enlatadas  Si es posible, trate de elegir opciones enlatadas bajas en sodio  Incluya dion variedad de verduras y frutas, giana las siguientes:     1 Corpus ilan, dayron o melocotón medianos (aproximadamente ½ taza picada)    ½ banana pequeña    ½ taza de bayas, giana arándanos, fresas o moras    1 taza de verduras de hoja anurag crudas, giana [place], espinacas, col rizada o berza    ½ taza de verduras congeladas o enlatadas (sin sal agregada), giana judías verdes    ½ taza de fruta fresca, congelada o enlatada (enlatada en sirope liviano o jugo de fruta)    ½ taza de jugo de verduras o frutas    Limite o no consuma verduras y frutas elaboradas de las siguientes maneras:     [place] congelada, giana las cerezas, con azúcar añadida    Frutas en crema o salsa de [place]    Las verduras enlatadas son altas en sodio  [place], vegetales en escabeche, y otros alimentos preparados con vinagre    Vegetales fritos o vegetales en mantequilla o salsas altas en grasas    Lo que necesita saber acerca de los alimentos con proteína:   Incluya alimentos proteicos magros o bajos en grasa, giana los siguientes:     Dioni taryn (kraig, [place]) sin piel    Pescado (sobre todo pescado con grasa, giana salmón, atún fresco o caballa)    [place] de res o de cerdo magra (lomo, carne molida extra Jame Oumou)    Claras de [place] y sustitutos del huevo    1 taza de leche descremada o leche 1%    1½ onzas de queso descremado o bajo en grasas    6 onzas de yogurt descremado o bajo en grasas    Limite o no consuma alimentos con alto contenido de proteínas, [facility] siguientes     [address] o [place], giana carne preparada con maíz, tocineta, jamón, perros calientes, y salchichas    Frijoles enlatados y dioni enlatadas o en pasta, giana dioni en conserva, jennifer, anchoas y [address] de [address] para emparedado, giana [place], [place], [place], y carne en rebanada    Dioni altas en grasas (biste estilo T-bone, carne molida para hamburguesas, costillas)    Huevos enteros y yemas de [place]    Leche [place], [place] al 2% y crema    Queso normal y queso fundido    Otras pautas que debe seguir:  Mantenga un peso saludable  Lares riesgo de enfermedad cardíaca es aún más alto si usted tiene sobrepeso  Lares médico podría sugerirle que adelgace si tiene sobrepeso  Usted puede perder peso si se propone consumir menos calorías y alimentos que tengan azúcar y grasas agregadas  El plan de alimentación DASH puede ayudarle a lograrlo  Kadi buena forma de disminuir el consumo de calorías es consumiendo porciones más pequeñas en cada comida y menos meriendas entre comidas  Consulte a lares médico para obtener más información sobre cómo adelgazar  Realice actividad física con regularidad  El ejercicio regular puede ayudarle a alcanzar o mantener un peso saludable  El ejercicio regular también puede ayudarle a disminuir lares presión arterial y mejorar clover niveles de colesterol  Jose E ejercicios moderados por 30 minutos o más todos los días de la [place]  Para bajar peso, asegúrese de ejercitarse por lo menos 60 minutos  Consulte con lares médico sobre un programa de ejercicio adecuado para usted  Limite el consumo de alcohol  Las mujeres deberían limitar el consumo de alcohol a 1 bebida por día  Los hombres deberían limitar el consumo de alcohol a 2 tragos al día  Un trago equivale a 12 onzas de cerveza, 5 onzas de vino o 1 onza y ½ de licor  Para más información:  [facility] [id number]  Jo-Ann [place] [id number]  Phone: [phone]  Web Address: ItCheaper no    © Copyright [employer] 2022 Information is for End User's use only and may not be sold, redistributed or otherwise used for commercial purposes  All illustrations and images included in CareNotes® are the copyrighted property of A Kristian KING  or [address] [place] [address] es sólo para uso en educación  De [place] intención no es darle un consejo médico sobre enfermedades o tratamientos  Colsulte con de león Margaret Dimitrios farmacéutico antes de seguir cualquier régimen médico para saber si es seguro y efectivo para usted

## 2022-06-14 DIAGNOSIS — I10 ESSENTIAL HYPERTENSION: ICD-10-CM

## 2022-06-14 RX ORDER — LISINOPRIL 40 MG/1
TABLET ORAL
Qty: 30 TABLET | Refills: 2 | Status: SHIPPED | OUTPATIENT
Start: 2022-06-14

## 2022-06-14 NOTE — RESULT ENCOUNTER NOTE
Echocardiogram is reviewed  Heart function is normal  There are some changes relating to high blood pressure  Goal would be to continue blood pressure medicine to keep systolic blood pressure close to 313 MMG and diastolic blood pressure less than 70 mmg

## 2022-06-24 DIAGNOSIS — I10 RESISTANT HYPERTENSION: ICD-10-CM

## 2022-06-25 RX ORDER — NADOLOL 80 MG/1
TABLET ORAL
Qty: 30 TABLET | Refills: 0 | OUTPATIENT
Start: 2022-06-25

## 2022-07-01 ENCOUNTER — OFFICE VISIT (OUTPATIENT)
Dept: CARDIOLOGY CLINIC | Facility: CLINIC | Age: 67
End: 2022-07-01
Payer: COMMERCIAL

## 2022-07-01 VITALS
HEIGHT: 69 IN | DIASTOLIC BLOOD PRESSURE: 76 MMHG | WEIGHT: 289 LBS | SYSTOLIC BLOOD PRESSURE: 144 MMHG | HEART RATE: 66 BPM | BODY MASS INDEX: 42.8 KG/M2

## 2022-07-01 DIAGNOSIS — R29.818 SUSPECTED SLEEP APNEA: ICD-10-CM

## 2022-07-01 DIAGNOSIS — E78.6 LOW HDL (UNDER 40): ICD-10-CM

## 2022-07-01 DIAGNOSIS — I10 RESISTANT HYPERTENSION: Primary | ICD-10-CM

## 2022-07-01 DIAGNOSIS — I11.9 HYPERTENSIVE HEART DISEASE WITHOUT HEART FAILURE: ICD-10-CM

## 2022-07-01 PROCEDURE — 99214 OFFICE O/P EST MOD 30 MIN: CPT | Performed by: INTERNAL MEDICINE

## 2022-07-01 RX ORDER — CARVEDILOL 25 MG/1
37.5 TABLET ORAL 2 TIMES DAILY WITH MEALS
Qty: 180 TABLET | Refills: 3 | Status: SHIPPED | OUTPATIENT
Start: 2022-07-01

## 2022-07-01 NOTE — PATIENT INSTRUCTIONS
CARDIOLOGY ASSESSMENT & PLAN     1  Resistant hypertension  carvedilol (COREG) 25 mg tablet   2  Suspected sleep apnea     3  Low HDL (under 40)     4  Hypertensive heart disease without heart failure       Resistant hypertension  Mr Dane Benton states that his blood pressures at home are now much better controlled even though review of hers recent visits to his primary physician and other physicians still suggest significant E elevated blood pressures  Today in office blood pressure is better controlled  He denies any symptoms  On examination there is no evidence of pulmonary vascular congestion  He does have trace lower extremity edema  His recent echocardiogram showed normal systolic and diastolic function and there was no evidence of pulmonary hypertension  At this time we are making following changes:    -- I am increasing the dose of carvedilol to 1 and half pill of 25 mg twice daily so he will be getting 37 5 mg twice daily  -- we are continuing his other medications  -- I am providing him with a blood pressure tracking sheet with instructions to record random blood pressures  I am advising him to bring the record and the med machine and the home medications for next visit  -- I am advising him to get the blood work which was requested at last visit in the next few days  -- reinforced with him the importance of medication compliance  -- I would like him to be referred for evaluation of obstructive sleep apnea as he does have features that predispose him to this condition although is denying symptoms

## 2022-07-01 NOTE — PROGRESS NOTES
[facility]  [address] [facility], [place] [address]  Phone#  [phone]  Fax#  [phone]  *-*-*-*-*-*-*-*-*-*-*-*-*-*-*-*-*-*-*-*-*-*-*-*-*-*-*-*-*-*-*-*-*-*-*-*-*-*-*-*-*-*-*-*-*-*-*-*-*-*-*-*-*-*  ENCOUNTER DATE: 07/01/22 4:29 PM  PATIENT NAME: Carolyn Patel   1955    [id number]  AGE:67 y o  SEX: male  Mitchell Marquez MD     PRIMARY CARE PHYSICIAN: Juan Carlos Haynes MD    DIAGNOSES:  1  Resistant hypertension  2  Obesity  3  Prediabetes  4  Incidental finding of adrenal adenoma  5  Dyslipidemia  6  Suspected obstructive sleep apnea  7  Eczema    ECHOCARDIOGRAM June 9, 2022:  Mild concentric left ventricular hypertrophy, normal left ventricular systolic function, normal diastolic function, EF around 60%, normal right ventricular size and systolic function, mild biatrial enlargement, normal aortic valve with no stenosis or regurgitation, mild mitral annular calcification, trace mitral valve regurgitation, trace tricuspid and pulmonic valve regurgitation, no obvious pulmonary hypertension, no pericardial effusion  CURRENT ECG   No results found for this visit on 07/01/22  CARDIOLOGY ASSESSMENT & PLAN     1  Resistant hypertension  carvedilol (COREG) 25 mg tablet   2  Suspected sleep apnea     3  Low HDL (under 40)     4  Hypertensive heart disease without heart failure       Resistant hypertension  Mr Carolyn Patel states that his blood pressures at home are now much better controlled even though review of hers recent visits to his primary physician and other physicians still suggest significant E elevated blood pressures  Today in office blood pressure is better controlled  He denies any symptoms  On examination there is no evidence of pulmonary vascular congestion  He does have trace lower extremity edema  His recent echocardiogram showed normal systolic and diastolic function and there was no evidence of pulmonary hypertension      At this time we are making following changes:    -- I am increasing the dose of carvedilol to 1 and half pill of 25 mg twice daily so he will be getting 37 5 mg twice daily  -- we are continuing his other medications  -- I am providing him with a blood pressure tracking sheet with instructions to record random blood pressures  I am advising him to bring the record and the med machine and the home medications for next visit  -- I am advising him to get the blood work which was requested at last visit in the next few days  -- reinforced with him the importance of medication compliance  -- I would like him to be referred for evaluation of obstructive sleep apnea as he does have features that predispose him to this condition although is denying symptoms  INTERVAL HISTORY & HISTORY OF PRESENT ILLNESS     Rita Augustin is here for follow-up regarding his cardiac comorbidities which include:  Resistant Hypertension with hypertensive heart disease,, suspected sleep apnea and other comorbidities  As patient primarily speaks Arabic,  encounter is performed with use of [place]  telephone translation service (  [id number] ) he was last seen by us  In May 2022  We had changed his antihypertensive medications and advised her to have an echocardiogram and blood work  Today patient reports that he has made the recommended changes to his medical regimen  He reports that he is feeling better and his blood pressures at home are much well controlled mostly in 130s over high 80s to low 90s  He denies any chest pain or shortness of breath or dizziness or lightheadedness or palpitations or headaches  He denies any presyncope/syncope or falls  He reports being compliant with medications  Functional capacity status:  Good   (Excellent- >10 METs; Good: (7-10 METs); Moderate (4-7 METs);  Poor (<= 4 METs)    Any chronic stressors:  None   (feeling of poor health, financial problems, and social isolation etc)     Tobacco or alcohol dependence:  He does not smoke and he does not drink  Current cardiac medications:  Carvedilol 12 5 mg twice daily, Procardia XL 60 mg in the morning and 30 mg in the evening, lisinopril 40 mg daily, chlorthalidone 50 mg daily  His last blood work was 1 April 19, 2022  His renal function was normal, potassium was 3 9, LFTs were normal, his total cholesterol was 142, triglyceride was in 92, HDL was 36, calculated LDL was 88  His last hemoglobin A1c was 6 1   TSH was 1 92  Recently his renin, aldosterone and cortisol levels were checked and they were normal     REVIEW OF SYSTEMS   Positive for:  As noted above in HPI  Negative for: All remaining as reviewed below and in HPI  SYSTEM SYMPTOMS REVIEWED:  General--weight change, fever, night sweats  Respiratory--cough, wheezing, shortness of breath, sputum production  Cardiovascular--chest pain, syncope, dyspnea on exertion, edema, decline in exercise tolerance, claudication   Gastrointestinal--persistent vomiting, diarrhea, abdominal distention, blood in stool   Muscular or skeletal--joint pain or swelling   Neurologic--headaches, syncope, abnormal movement  Hematologic--history of easy bruising and bleeding   Endocrine--thyroid enlargement, heat or cold intolerance, polyuria   Psychiatric--anxiety, depression     *-*-*-*-*-*-*-*-*-*-*-*-*-*-*-*-*-*-*-*-*-*-*-*-*-*-*-*-*-*-*-*-*-*-*-*-*-*-*-*-*-*-*-*-*-*-*-*-*-*-*-*-*-*-  VITAL SIGNS     CURRENT VITAL SIGNS:   Vitals:    07/01/22 1527   BP: 144/76   BP Location: Right arm   Patient Position: Sitting   Cuff Size: Large   Pulse: 66   Weight: 131 kg (289 lb)   Height: 5' 9" (1 753 m)       BMI: Body mass index is 42 68 kg/m²      WEIGHTS:   Wt Readings from Last 25 Encounters:   07/01/22 131 kg (289 lb)   06/13/22 134 kg (295 lb 14 4 oz)   06/09/22 133 kg (293 lb)   06/01/22 133 kg (293 lb)   05/27/22 134 kg (296 lb 3 2 oz)   05/16/22 134 kg (294 lb 9 6 oz)   04/14/22 134 kg (295 lb 14 4 oz)   03/03/22 136 kg (299 lb 3 2 oz)   12/09/21 130 kg (286 lb)   08/18/21 128 kg (282 lb)   08/09/21 126 kg (276 lb 11 2 oz)   08/03/21 126 kg (278 lb)   07/07/21 126 kg (278 lb)   02/05/21 (!) 137 kg (302 lb 8 oz)   05/05/20 136 kg (300 lb)   04/03/20 136 kg (300 lb)   03/11/20 133 kg (293 lb 9 6 oz)   10/15/19 134 kg (296 lb)   08/26/19 132 kg (290 lb)   07/10/19 132 kg (291 lb)   04/11/19 130 kg (286 lb)   03/14/19 127 kg (281 lb)   02/28/19 129 kg (285 lb)   07/19/18 129 kg (285 lb)        *-*-*-*-*-*-*-*-*-*-*-*-*-*-*-*-*-*-*-*-*-*-*-*-*-*-*-*-*-*-*-*-*-*-*-*-*-*-*-*-*-*-*-*-*-*-*-*-*-*-*-*-*-*-  PHYSICAL EXAM     General Appearance:    Alert, cooperative, no distress, appears stated age, large built, slightly obese   Head, Eyes, ENT:    No obvious abnormality, moist mucous mebranes  Neck:   Supple, no carotid bruit or JVD   Back:     Symmetric, no curvature  Lungs:     Respirations unlabored  Clear to auscultation bilaterally,    Chest wall:    No tenderness or deformity   Heart:    Regular rate and rhythm, S1 and S2 normal, no murmur, rub  or gallop  Abdomen:     Soft, non-tender, No obvious masses, or organomegaly   Extremities:   Extremities warm, no cyanosis, trace lower extremity edema   Skin:   mild venostatic changes in lower extremities  Normal skin color, texture, and turgor   No rashes or lesions     *-*-*-*-*-*-*-*-*-*-*-*-*-*-*-*-*-*-*-*-*-*-*-*-*-*-*-*-*-*-*-*-*-*-*-*-*-*-*-*-*-*-*-*-*-*-*-*-*-*-*-*-*-*-  CURRENT MEDICATIONS LIST     Current Outpatient Medications:     carvedilol (COREG) 25 mg tablet, Take 1 5 tablets (37 5 mg total) by mouth 2 (two) times a day with meals, Disp: 180 tablet, Rfl: 3    chlorthalidone (HYGROTEN) 50 MG tablet, Take 1 tablet (50 mg total) by mouth daily, Disp: 30 tablet, Rfl: 2    lisinopril (ZESTRIL) 40 mg tablet, TAKE ONE TABLET BY MOUTH ONCE DAILY AT BEDTIME, Disp: 30 tablet, Rfl: 2    NIFEdipine (PROCARDIA XL) 30 mg 24 hr tablet, Take 1 tablet (30 mg total) by mouth every evening, Disp: 90 tablet, Rfl: 3    NIFEdipine (PROCARDIA XL) 60 mg 24 hr tablet, Take 1 tablet (60 mg total) by mouth every morning, Disp: 90 tablet, Rfl: 3    rosuvastatin (CRESTOR) 10 MG tablet, Take 1 tablet (10 mg total) by mouth daily at bedtime, Disp: 30 tablet, Rfl: 2       ALLERGIES     Allergies   Allergen Reactions    Amlodipine Other (See Comments)     Nightmares and difficulty sleeping       *-*-*-*-*-*-*-*-*-*-*-*-*-*-*-*-*-*-*-*-*-*-*-*-*-*-*-*-*-*-*-*-*-*-*-*-*-*-*-*-*-*-*-*-*-*-*-*-*-*-*-*-*-*-  LABORATORY DATA     Sodium   Date Value Ref Range Status   05/16/2018 143 137 - 147 MEQ/L Final     Sodium   Date Value Ref Range Status   05/16/2018 143 137 - 147 MEQ/L Final     Potassium   Date Value Ref Range Status   04/19/2022 3 9 3 5 - 5 3 mmol/L Final   03/04/2022 4 1 3 5 - 5 3 mmol/L Final   12/15/2021 4 1 3 5 - 5 3 mmol/L Final   05/16/2018 4 8 3 6 - 5 0 MEQ/L Final     Chloride   Date Value Ref Range Status   04/19/2022 105 100 - 108 mmol/L Final   03/04/2022 105 100 - 108 mmol/L Final   12/15/2021 106 100 - 108 mmol/L Final   05/16/2018 103 97 - 108 MEQ/L Final     CO2   Date Value Ref Range Status   04/19/2022 29 21 - 32 mmol/L Final   03/04/2022 29 21 - 32 mmol/L Final   12/15/2021 31 21 - 32 mmol/L Final   05/16/2018 31 (H) 22 - 30 MMOL/L Final     Anion Gap   Date Value Ref Range Status   05/16/2018 9 5 - 14 MMOL/L Final     BUN   Date Value Ref Range Status   04/19/2022 15 5 - 25 mg/dL Final   03/04/2022 18 5 - 25 mg/dL Final   12/15/2021 18 5 - 25 mg/dL Final   05/16/2018 18 5 - 25 MG/DL Final     Creatinine   Date Value Ref Range Status   04/19/2022 0 95 0 60 - 1 30 mg/dL Final     Comment:     Standardized to IDMS reference method   03/04/2022 0 89 0 60 - 1 30 mg/dL Final     Comment:     Standardized to IDMS reference method   12/15/2021 0 88 0 60 - 1 30 mg/dL Final     Comment:     Standardized to IDMS reference method     eGFR   Date Value Ref Range Status 04/19/2022 83 ml/min/1 73sq m Final   03/04/2022 89 ml/min/1 73sq m Final   12/15/2021 90 ml/min/1 73sq m Final     Glucose   Date Value Ref Range Status   05/16/2018 103 (H) 70 - 99 MG/DL Final     Calcium   Date Value Ref Range Status   04/19/2022 9 3 8 3 - 10 1 mg/dL Final   03/04/2022 10 2 (H) 8 3 - 10 1 mg/dL Final   12/15/2021 10 1 8 3 - 10 1 mg/dL Final   05/16/2018 9 6 8 4 - 10 2 MG/DL Final     AST   Date Value Ref Range Status   04/19/2022 24 5 - 45 U/L Final     Comment:     Specimen collection should occur prior to Sulfasalazine administration due to the potential for falsely depressed results  03/04/2022 16 5 - 45 U/L Final     Comment:     Specimen collection should occur prior to Sulfasalazine administration due to the potential for falsely depressed results  08/03/2021 18 5 - 45 U/L Final     Comment:     Specimen collection should occur prior to Sulfasalazine administration due to the potential for falsely depressed results  05/16/2018 21 17 - 59 U/L Final     ALT   Date Value Ref Range Status   04/19/2022 30 12 - 78 U/L Final     Comment:     Specimen collection should occur prior to Sulfasalazine and/or Sulfapyridine administration due to the potential for falsely depressed results  03/04/2022 27 12 - 78 U/L Final     Comment:     Specimen collection should occur prior to Sulfasalazine and/or Sulfapyridine administration due to the potential for falsely depressed results  08/03/2021 25 12 - 78 U/L Final     Comment:     Specimen collection should occur prior to Sulfasalazine and/or Sulfapyridine administration due to the potential for falsely depressed results      05/16/2018 24 9 - 52 U/L Final     Alkaline Phosphatase   Date Value Ref Range Status   04/19/2022 64 46 - 116 U/L Final   03/04/2022 69 46 - 116 U/L Final   08/03/2021 72 46 - 116 U/L Final   05/16/2018 69 43 - 122 U/L Final     Total Protein   Date Value Ref Range Status   05/16/2018 7 3 5 9 - 8 4 G/DL Final     Total Bilirubin   Date Value Ref Range Status   05/16/2018 0 5 <1 3 mg/dL Final     WBC   Date Value Ref Range Status   03/04/2022 11 63 (H) 4 31 - 10 16 Thousand/uL Final   12/15/2021 11 82 (H) 4 31 - 10 16 Thousand/uL Final   08/26/2019 20 10 (H) 4 50 - 11 00 Thousand/uL Final   05/16/2018 11 6 (H) 4 5 - 11 0 K/MCL Final     Hemoglobin   Date Value Ref Range Status   03/04/2022 14 8 12 0 - 17 0 g/dL Final   12/15/2021 14 7 12 0 - 17 0 g/dL Final   08/26/2019 15 7 13 5 - 17 5 g/dL Final   05/16/2018 14 3 13 5 - 17 5 G/DL Final     Platelets   Date Value Ref Range Status   03/04/2022 315 149 - 390 Thousands/uL Final   12/15/2021 297 149 - 390 Thousands/uL Final   08/26/2019 258 150 - 450 Thousands/uL Final   05/16/2018 288 150 - 450 K/MCL Final     No results found for: PT, PTT, INR  No results found for: CKMB, DIGOXIN  No results found for: TSH  Cholesterol   Date Value Ref Range Status   05/16/2018 144 <200 mg/dL Final     Comment:            NCEP ATP III      <200           DESIRABLE   200-239     BORDERLINE HIGH   > = 240                HIGH        Hemoglobin A1C   Date Value Ref Range Status   03/04/2022 6 1 (H) Normal 3 8-5 6%; PreDiabetic 5 7-6 4%; Diabetic >=6 5%; Glycemic control for adults with diabetes <7 0% % Final   05/16/2018 6 1 (H) <5 7 % Final     Comment:     HEMOGLOBIN A1c VALUES OF 5 7-6 4 PERCENT  INDICATE AN INCREASED RISK OF DEVELOPING  DIABETES MELLITUS  HEMOGLOBIN A1c VALUES  GREATER THAN OR EQUAL TO 6 5 PERCENT ARE  DIAGNOSTIC OF DIABETES MELLITUS  TARGET FOR  DIABETIC CONTROL IS LESS THAN 7 PERCENT  THIS  BORONATE AFFINITY Hb A1c METHOD PROVIDES  ACCURATE ANALYTICAL RESULTS IN THE PRESENCE  OF NEARLY ALL HEMOGLOBIN VARIANTS  Hb F  HIGHER THAN 10 PERCENT OF TOTAL Hb MAY YIELD  FALSELY LOW RESULTS  CONDITIONS THAT SHORTEN  RED CELL SURVIVAL, SUCH AS THE PRESENCE OF  UNSTABLE HEMOGLOBINS LIKE Hb SS, Hb CC AND  Hb SC OR OTHER CAUSES OF HEMOLYTIC ANEMIA MAY  YIELD FALSELY LOW RESULTS   IRON DEFICIENCY  ANEMIA MAY YIELD FALSELY HIGH RESULTS  No results found for: Maryellen Prier, GRAMSTAIN, URINECX, WOUNDCULT, BODYFLUIDCUL, MRSACULTURE, INFLUAPCR, INFLUBPCR, RSVPCR, LEGIONELLAUR, CDIFFTOXINB    *-*-*-*-*-*-*-*-*-*-*-*-*-*-*-*-*-*-*-*-*-*-*-*-*-*-*-*-*-*-*-*-*-*-*-*-*-*-*-*-*-*-*-*-*-*-*-*-*-*-*-*-*-*-  PREVIOUS CARDIOLOGY & RADIOLOGY TEST RESULTS   I have personally reviewed pertinent results of cardiovascular tests noted below  No results found for this or any previous visit  No results found for this or any previous visit  No results found for this or any previous visit  No results found for this or any previous visit  Echo complete w/ contrast if indicated    Left Ventricle: Left ventricular cavity size is normal  Wall thickness   is mildly increased  There is mild concentric hypertrophy  The left   ventricular ejection fraction is 60%  Systolic function is normal  Wall   motion is normal  Diastolic function is normal     Left Atrium: The atrium is mildly dilated    Right Atrium: The atrium is mildly dilated    Mitral Valve: There is mild annular calcification  *-*-*-*-*-*-*-*-*-*-*-*-*-*-*-*-*-*-*-*-*-*-*-*-*-*-*-*-*-*-*-*-*-*-*-*-*-*-*-*-*-*-*-*-*-*-*-*-*-*-*-*-*-*-  SIGNATURES:   @PKD@   Rupa Robbins MD; TORSTEN    *-*-*-*-*-*-*-*-*-*-*-*-*-*-*-*-*-*-*-*-*-*-*-*-*-*-*-*-*-*-*-*-*-*-*-*-*-*-*-*-*-*-*-*-*-*-*-*-*-*-*-*-*-*-  PAST MEDICAL HISTORY:  Past Medical History:   Diagnosis Date    Hypertension     Prediabetes     PAST SURGICAL HISTORY:  No past surgical history on file        FAMILY HISTORY:  Family History   Problem Relation Age of Onset    Hypertension Mother     Coronary artery disease Mother     Hypertension Father     Coronary artery disease Father     Hypertension Sister     Coronary artery disease Sister     Hypertension Brother     Coronary artery disease Brother     Hypertension Paternal Neymar Amis     Stroke Paternal Uncle     SOCIAL HISTORY:  Social History     Tobacco Use   Smoking Status Never Smoker   Smokeless Tobacco Never Used   Tobacco Comment    only tried one cigarette when teenager      Social History     Substance and Sexual Activity   Alcohol Use Never     Social History     Substance and Sexual Activity   Drug Use Never    [id number]     *-*-*-*-*-*-*-*-*-*-*-*-*-*-*-*-*-*-*-*-*-*-*-*-*-*-*-*-*-*-*-*-*-*-*-*-*-*-*-*-*-*-*-*-*-*-*-*-*-*-*-*-*-*  ALLERGIES:  Allergies   Allergen Reactions    Amlodipine Other (See Comments)     Nightmares and difficulty sleeping    CURRENT SCHEDULED MEDICATIONS:    Current Outpatient Medications:     carvedilol (COREG) 25 mg tablet, Take 1 5 tablets (37 5 mg total) by mouth 2 (two) times a day with meals, Disp: 180 tablet, Rfl: 3    chlorthalidone (HYGROTEN) 50 MG tablet, Take 1 tablet (50 mg total) by mouth daily, Disp: 30 tablet, Rfl: 2    lisinopril (ZESTRIL) 40 mg tablet, TAKE ONE TABLET BY MOUTH ONCE DAILY AT BEDTIME, Disp: 30 tablet, Rfl: 2    NIFEdipine (PROCARDIA XL) 30 mg 24 hr tablet, Take 1 tablet (30 mg total) by mouth every evening, Disp: 90 tablet, Rfl: 3    NIFEdipine (PROCARDIA XL) 60 mg 24 hr tablet, Take 1 tablet (60 mg total) by mouth every morning, Disp: 90 tablet, Rfl: 3    rosuvastatin (CRESTOR) 10 MG tablet, Take 1 tablet (10 mg total) by mouth daily at bedtime, Disp: 30 tablet, Rfl: 2     *-*-*-*-*-*-*-*-*-*-*-*-*-*-*-*-*-*-*-*-*-*-*-*-*-*-*-*-*-*-*-*-*-*-*-*-*-*-*-*-*-*-*-*-*-*-*-*-*-*-*-*-*-*

## 2022-07-01 NOTE — ASSESSMENT & PLAN NOTE
Mr  Sunita Garcia states that his blood pressures at home are now much better controlled even though review of hers recent visits to his primary physician and other physicians still suggest significant E elevated blood pressures  Today in office blood pressure is better controlled  He denies any symptoms  On examination there is no evidence of pulmonary vascular congestion  He does have trace lower extremity edema  His recent echocardiogram showed normal systolic and diastolic function and there was no evidence of pulmonary hypertension  At this time we are making following changes:    -- I am increasing the dose of carvedilol to 1 and half pill of 25 mg twice daily so he will be getting 37 5 mg twice daily  -- we are continuing his other medications  -- I am providing him with a blood pressure tracking sheet with instructions to record random blood pressures  I am advising him to bring the record and the med machine and the home medications for next visit  -- I am advising him to get the blood work which was requested at last visit in the next few days  -- reinforced with him the importance of medication compliance  -- I would like him to be referred for evaluation of obstructive sleep apnea as he does have features that predispose him to this condition although is denying symptoms

## 2022-07-07 DIAGNOSIS — I10 RESISTANT HYPERTENSION: ICD-10-CM

## 2022-07-11 RX ORDER — CHLORTHALIDONE 50 MG/1
TABLET ORAL
Qty: 30 TABLET | Refills: 1 | Status: SHIPPED | OUTPATIENT
Start: 2022-07-11 | End: 2022-09-09

## 2022-08-09 DIAGNOSIS — E78.6 LOW HDL (UNDER 40): ICD-10-CM

## 2022-08-10 RX ORDER — ROSUVASTATIN CALCIUM 10 MG/1
TABLET, COATED ORAL
Qty: 30 TABLET | Refills: 1 | Status: SHIPPED | OUTPATIENT
Start: 2022-08-10 | End: 2022-10-25

## 2022-08-30 DIAGNOSIS — I10 ESSENTIAL HYPERTENSION: ICD-10-CM

## 2022-08-31 RX ORDER — LISINOPRIL 40 MG/1
TABLET ORAL
Qty: 30 TABLET | Refills: 1 | Status: SHIPPED | OUTPATIENT
Start: 2022-08-31 | End: 2022-10-26

## 2022-09-06 DIAGNOSIS — I10 RESISTANT HYPERTENSION: ICD-10-CM

## 2022-09-09 RX ORDER — CHLORTHALIDONE 50 MG/1
TABLET ORAL
Qty: 30 TABLET | Refills: 0 | Status: SHIPPED | OUTPATIENT
Start: 2022-09-09 | End: 2022-10-25

## 2022-10-10 DIAGNOSIS — E78.6 LOW HDL (UNDER 40): ICD-10-CM

## 2022-10-10 DIAGNOSIS — I10 RESISTANT HYPERTENSION: ICD-10-CM

## 2022-10-25 DIAGNOSIS — I10 ESSENTIAL HYPERTENSION: ICD-10-CM

## 2022-10-25 RX ORDER — ROSUVASTATIN CALCIUM 10 MG/1
TABLET, COATED ORAL
Qty: 30 TABLET | Refills: 0 | Status: SHIPPED | OUTPATIENT
Start: 2022-10-25

## 2022-10-25 RX ORDER — CHLORTHALIDONE 50 MG/1
TABLET ORAL
Qty: 30 TABLET | Refills: 0 | Status: SHIPPED | OUTPATIENT
Start: 2022-10-25

## 2022-10-26 RX ORDER — LISINOPRIL 40 MG/1
TABLET ORAL
Qty: 30 TABLET | Refills: 0 | Status: SHIPPED | OUTPATIENT
Start: 2022-10-26

## 2022-11-18 ENCOUNTER — OFFICE VISIT (OUTPATIENT)
Dept: FAMILY MEDICINE CLINIC | Facility: CLINIC | Age: 67
End: 2022-11-18

## 2022-11-18 VITALS
WEIGHT: 245.4 LBS | RESPIRATION RATE: 18 BRPM | HEART RATE: 61 BPM | TEMPERATURE: 96.4 F | HEIGHT: 69 IN | SYSTOLIC BLOOD PRESSURE: 174 MMHG | DIASTOLIC BLOOD PRESSURE: 84 MMHG | BODY MASS INDEX: 36.35 KG/M2 | OXYGEN SATURATION: 98 %

## 2022-11-18 DIAGNOSIS — I10 RESISTANT HYPERTENSION: Primary | ICD-10-CM

## 2022-11-18 DIAGNOSIS — G47.33 OSA (OBSTRUCTIVE SLEEP APNEA): ICD-10-CM

## 2022-11-18 NOTE — ASSESSMENT & PLAN NOTE
After significant counseling pt is willing to undergo sleep study  Advised to call and reschedule   If he needs a new referral advised to contact office for renewal

## 2022-11-18 NOTE — PROGRESS NOTES
Name: Tena Clark      : 1955      MRN: [id number]  Encounter Provider: Constance Brooks MD  Encounter Date: 2022   Encounter department: [address] [place] [address]  Resistant hypertension  Assessment & Plan:  Blood pressure uncontrolled at this time  Pt asymptomatic  Likely continued elevations second to significant social stressors and questionable medication compliance  Extensive counseling performed  Given handwritten instructions in Bangladeshi on appropriate medication dosage and administration  Pt expressed understanding and all questions answered  -Goal <90 per [facility]  -Per last cardiology note 2022: pt supposed to be taking carvedilol 37 5mg (25mg 1 5 pill) BID, nifedipine 60mg am and 30mg PM, lisinopril 40mg daily, chlorthalidone 50mg daily  -continue above medication as prescribed  -after counseling pt willing to undergo further cardiology evaluation  Will contact office to make appointment   -counseled on stress relieving techniques  -ED precautions given  -f/u in one month for reevaluation of BPs      2  ALEK (obstructive sleep apnea)  Assessment & Plan:  After significant counseling pt is willing to undergo sleep study  Advised to call and reschedule  If he needs a new referral advised to contact office for renewal            Subjective      74yo male presenting for evaluation of resistant HTN  Currently following  [facility], last appointment on 2022  Pt also endorsing significant social stressors which he states is making his blood pressure worse  Pt no longer wants to follow with Cardiology as he says that he keeps going and they never listen to him  When asked about his sleep study, he states he did not want to undergo investigation because "they always order tests and never give me the results " States he is working on moving back to [facility] for good   Stating move will occur in January after the holidays  Review of Systems   Constitutional: Negative for chills and fever  HENT: Negative for congestion, ear pain, rhinorrhea, sore throat and trouble swallowing  Eyes: Negative for pain and visual disturbance  Respiratory: Negative for cough, chest tightness and shortness of breath  Cardiovascular: Negative for chest pain, palpitations and leg swelling  Gastrointestinal: Negative for abdominal pain, blood in stool, constipation, diarrhea, nausea and vomiting  Genitourinary: Negative for dysuria and hematuria  Musculoskeletal: Negative for arthralgias and back pain  Skin: Negative for color change and rash  Neurological: Negative for dizziness, seizures, syncope, weakness, numbness and headaches  All other systems reviewed and are negative  Current Outpatient Medications on File Prior to Visit   Medication Sig   • carvedilol (COREG) 25 mg tablet Take 1 5 tablets (37 5 mg total) by mouth 2 (two) times a day with meals   • chlorthalidone (HYGROTEN) 50 MG tablet TAKE ONE TABLET BY MOUTH ONCE DAILY   • lisinopril (ZESTRIL) 40 mg tablet TAKE ONE TABLET BY MOUTH ONCE DAILY AT BEDTIME   • NIFEdipine (PROCARDIA XL) 30 mg 24 hr tablet Take 1 tablet (30 mg total) by mouth every evening   • NIFEdipine (PROCARDIA XL) 60 mg 24 hr tablet Take 1 tablet (60 mg total) by mouth every morning   • rosuvastatin (CRESTOR) 10 MG tablet TAKE ONE TABLET BY MOUTH AT BEDTIME   • [DISCONTINUED] nadolol (CORGARD) 80 MG tablet TAKE ONE TABLET BY MOUTH ONCE DAILY       Objective     BP (!) 174/84 (BP Location: Right arm, Patient Position: Sitting, Cuff Size: Standard)   Pulse 61   Temp (!) 96 4 °F (35 8 °C) (Temporal)   Resp 18   Ht 5' 9" (1 753 m)   Wt 111 kg (245 lb 6 4 oz)   SpO2 98%   BMI 36 24 kg/m²     Physical Exam  Vitals and nursing note reviewed  Constitutional:       General: He is not in acute distress  Appearance: Normal appearance  He is obese   He is not ill-appearing, toxic-appearing or diaphoretic  HENT:      Head: Normocephalic and atraumatic  Right Ear: External ear normal       Left Ear: External ear normal    Eyes:      General: No scleral icterus  Right eye: No discharge  Left eye: No discharge  Conjunctiva/sclera: Conjunctivae normal    Cardiovascular:      Rate and Rhythm: Normal rate and regular rhythm  Pulses: Normal pulses  Heart sounds: Normal heart sounds  No murmur heard  No friction rub  No gallop  Pulmonary:      Effort: Pulmonary effort is normal  No respiratory distress  Breath sounds: Normal breath sounds  No stridor  No wheezing, rhonchi or rales  Musculoskeletal:         General: No swelling or tenderness  Normal range of motion  Right lower leg: No edema  Left lower leg: No edema  Skin:     General: Skin is warm and dry  Capillary Refill: Capillary refill takes less than 2 seconds  Coloration: Skin is not jaundiced  Neurological:      General: No focal deficit present  Mental Status: He is alert     Psychiatric:         Mood and Affect: Mood normal          Behavior: Behavior normal        Matt Marte MD

## 2022-11-18 NOTE — ASSESSMENT & PLAN NOTE
Blood pressure uncontrolled at this time  Pt asymptomatic  Likely continued elevations second to significant social stressors and questionable medication compliance  Extensive counseling performed  Given handwritten instructions in German on appropriate medication dosage and administration  Pt expressed understanding and all questions answered  -Goal <90 per [facility]  -Per last cardiology note 7/1/2022: pt supposed to be taking carvedilol 37 5mg (25mg 1 5 pill) BID, nifedipine 60mg am and 30mg PM, lisinopril 40mg daily, chlorthalidone 50mg daily  -continue above medication as prescribed  -after counseling pt willing to undergo further cardiology evaluation   Will contact office to make appointment   -counseled on stress relieving techniques  -ED precautions given  -f/u in one month for reevaluation of BPs

## 2022-11-21 DIAGNOSIS — I10 ESSENTIAL HYPERTENSION: ICD-10-CM

## 2022-11-21 DIAGNOSIS — E78.6 LOW HDL (UNDER 40): ICD-10-CM

## 2022-11-22 DIAGNOSIS — I10 RESISTANT HYPERTENSION: ICD-10-CM

## 2022-11-22 RX ORDER — CHLORTHALIDONE 50 MG/1
TABLET ORAL
Qty: 30 TABLET | Refills: 0 | Status: SHIPPED | OUTPATIENT
Start: 2022-11-22

## 2022-11-24 RX ORDER — ROSUVASTATIN CALCIUM 10 MG/1
TABLET, COATED ORAL
Qty: 30 TABLET | Refills: 0 | Status: SHIPPED | OUTPATIENT
Start: 2022-11-24

## 2022-11-24 RX ORDER — LISINOPRIL 40 MG/1
TABLET ORAL
Qty: 30 TABLET | Refills: 0 | Status: SHIPPED | OUTPATIENT
Start: 2022-11-24

## 2022-12-27 DIAGNOSIS — I10 ESSENTIAL HYPERTENSION: ICD-10-CM

## 2022-12-27 DIAGNOSIS — E78.6 LOW HDL (UNDER 40): ICD-10-CM

## 2022-12-27 RX ORDER — LISINOPRIL 40 MG/1
TABLET ORAL
Qty: 30 TABLET | Refills: 0 | Status: SHIPPED | OUTPATIENT
Start: 2022-12-27

## 2022-12-27 RX ORDER — ROSUVASTATIN CALCIUM 10 MG/1
TABLET, COATED ORAL
Qty: 30 TABLET | Refills: 0 | Status: SHIPPED | OUTPATIENT
Start: 2022-12-27

## 2022-12-30 ENCOUNTER — OFFICE VISIT (OUTPATIENT)
Dept: FAMILY MEDICINE CLINIC | Facility: CLINIC | Age: 67
End: 2022-12-30

## 2022-12-30 VITALS
SYSTOLIC BLOOD PRESSURE: 172 MMHG | RESPIRATION RATE: 18 BRPM | HEIGHT: 69 IN | HEART RATE: 78 BPM | WEIGHT: 295 LBS | TEMPERATURE: 97.8 F | OXYGEN SATURATION: 98 % | BODY MASS INDEX: 43.69 KG/M2 | DIASTOLIC BLOOD PRESSURE: 82 MMHG

## 2022-12-30 DIAGNOSIS — I10 RESISTANT HYPERTENSION: Primary | ICD-10-CM

## 2022-12-30 DIAGNOSIS — Z23 FLU VACCINE NEED: ICD-10-CM

## 2022-12-30 RX ORDER — HYDRALAZINE HYDROCHLORIDE 10 MG/1
10 TABLET, FILM COATED ORAL 2 TIMES DAILY
Qty: 60 TABLET | Refills: 0 | Status: CANCELLED | OUTPATIENT
Start: 2022-12-30 | End: 2023-01-29

## 2022-12-30 NOTE — PROGRESS NOTES
Name: Rufus Monroe      : 1955      MRN: [id number]  Encounter Provider: Sebastien Rodriguez MD  Encounter Date: 2022   Encounter department: [address] [place] [address]  Resistant hypertension  Assessment & Plan:  BP not controlled during today's visit  Recheck BP of 168/78 and states his home BP this morning was 148/78  Pt asymptomatic  Pt left prior to completion of visit  Have messaged clerical staff so that follow up appointment can be scheduled in 2 weeks for repeat BP check and evaluation of home BP logs  Likely to need medication adjustment  Again advised pt today to contact Cardiology for continued evaluation      - Goal BP <150/90  - Continue current medication  - ED precautions given during visit      2  Flu vaccine need  -     influenza vaccine, high-dose, PF 0 7 mL (FLUZONE HIGH-DOSE)         Subjective      74yo male presenting to clinic for follow up of resistant hypertension  Currently endorsing no concerns or symptoms of elevated BP  Pt aware that continued BP elevations are associated with increased risks of MI and CVAs  Endorsing compliance with all medication  States he is going on vacation for 2 weeks to [facility] and is sure his BP will be controlled when he returns  Review of Systems   Constitutional: Negative for chills and fever  HENT: Negative for congestion, rhinorrhea, sore throat and trouble swallowing  Eyes: Negative for visual disturbance  Respiratory: Negative for cough, chest tightness and shortness of breath  Cardiovascular: Negative for chest pain, palpitations and leg swelling  Gastrointestinal: Negative for abdominal pain, blood in stool, constipation, diarrhea, nausea and vomiting  Genitourinary: Negative for dysuria and hematuria  Skin: Negative for color change and rash  Neurological: Negative for dizziness, seizures, syncope, weakness, light-headedness, numbness and headaches  Current Outpatient Medications on File Prior to Visit   Medication Sig   • carvedilol (COREG) 25 mg tablet Take 1 5 tablets (37 5 mg total) by mouth 2 (two) times a day with meals   • chlorthalidone (HYGROTEN) 50 MG tablet Take 1 tablet (50 mg total) by mouth daily   • lisinopril (ZESTRIL) 40 mg tablet TAKE ONE TABLET BY MOUTH ONCE DAILY AT BEDTIME   • NIFEdipine (PROCARDIA XL) 30 mg 24 hr tablet Take 1 tablet (30 mg total) by mouth every evening   • NIFEdipine (PROCARDIA XL) 60 mg 24 hr tablet Take 1 tablet (60 mg total) by mouth every morning   • rosuvastatin (CRESTOR) 10 MG tablet TAKE ONE TABLET BY MOUTH ONCE DAILY AT BEDTIME   • [DISCONTINUED] nadolol (CORGARD) 80 MG tablet TAKE ONE TABLET BY MOUTH ONCE DAILY       Objective     BP (!) 172/82 (BP Location: Left arm, Patient Position: Sitting, Cuff Size: Large)   Pulse 78   Temp 97 8 °F (36 6 °C) (Temporal)   Resp 18   Ht 5' 9" (1 753 m)   Wt 134 kg (295 lb)   SpO2 98%   BMI 43 56 kg/m²     Physical Exam  Vitals and nursing note reviewed  Constitutional:       General: He is not in acute distress  Appearance: Normal appearance  He is obese  He is not ill-appearing, toxic-appearing or diaphoretic  HENT:      Head: Normocephalic and atraumatic  Eyes:      General: No scleral icterus  Right eye: No discharge  Left eye: No discharge  Conjunctiva/sclera: Conjunctivae normal       Pupils: Pupils are equal, round, and reactive to light  Cardiovascular:      Rate and Rhythm: Normal rate and regular rhythm  Pulses: Normal pulses  Heart sounds: Normal heart sounds  No murmur heard  No friction rub  No gallop  Pulmonary:      Effort: Pulmonary effort is normal  No respiratory distress  Breath sounds: Normal breath sounds  No stridor  No wheezing, rhonchi or rales  Chest:      Chest wall: No tenderness  Abdominal:      General: There is no distension  Palpations: Abdomen is soft        Tenderness: There is no abdominal tenderness  There is no right CVA tenderness, left CVA tenderness, guarding or rebound  Musculoskeletal:         General: Normal range of motion  Cervical back: Normal range of motion  Right lower leg: No edema  Left lower leg: No edema  Skin:     General: Skin is warm and dry  Capillary Refill: Capillary refill takes less than 2 seconds  Coloration: Skin is not jaundiced or pale  Findings: No bruising, erythema, lesion or rash  Neurological:      General: No focal deficit present  Mental Status: He is alert and oriented to person, place, and time     Psychiatric:         Mood and Affect: Mood normal          Behavior: Behavior normal        Jeet Blum MD

## 2022-12-30 NOTE — ASSESSMENT & PLAN NOTE
BP not controlled during today's visit  Recheck BP of 168/78 and states his home BP this morning was 148/78  Pt asymptomatic  Pt left prior to completion of visit  Have messaged clerical staff so that follow up appointment can be scheduled in 2 weeks for repeat BP check and evaluation of home BP logs  Likely to need medication adjustment   Again advised pt today to contact Cardiology for continued evaluation      - Goal BP <150/90  - Continue current medication  - ED precautions given during visit

## 2023-01-18 ENCOUNTER — OFFICE VISIT (OUTPATIENT)
Dept: FAMILY MEDICINE CLINIC | Facility: CLINIC | Age: 68
End: 2023-01-18

## 2023-01-18 VITALS
WEIGHT: 295.7 LBS | TEMPERATURE: 97.8 F | DIASTOLIC BLOOD PRESSURE: 90 MMHG | OXYGEN SATURATION: 98 % | SYSTOLIC BLOOD PRESSURE: 186 MMHG | HEART RATE: 80 BPM | HEIGHT: 69 IN | BODY MASS INDEX: 43.8 KG/M2 | RESPIRATION RATE: 18 BRPM

## 2023-01-18 DIAGNOSIS — I10 RESISTANT HYPERTENSION: Primary | ICD-10-CM

## 2023-01-18 RX ORDER — SPIRONOLACTONE 25 MG/1
25 TABLET ORAL DAILY
Qty: 30 TABLET | Refills: 0 | Status: SHIPPED | OUTPATIENT
Start: 2023-01-18 | End: 2023-02-17

## 2023-01-18 NOTE — PROGRESS NOTES
Name: Kayy Roland      : 1955      MRN: [id number]  Encounter Provider: Priscila Sanderson MD  Encounter Date: 2023   Encounter department: [facility]     1  Resistant hypertension  Assessment & Plan:  BP continues to be uncontrolled in office and at home  Pt endorsing compliance with medication  Will add another HTN medication and see if control can be achieved      -goal BP per [facility] <160/90  -continue carvedilol 37 5mg (25mg 1 5 pill) BID, nifedipine 60mg am and 30mg PM, lisinopril 40mg daily, chlorthalidone 50mg daily   Will begin spironolactone 12 5mg BID  Will require BMP check in 2 weeks after initiating medication   -counseled on maintaining low salt diet and decreasing stress  Weight loss counseling provided and pt willing to see dietician for assistance  Will place referral to Dietician for assistance in weight loss   -advised to schedule cardiology follow up for further med recommendations as he has not been seen in almost a year  -ED precautions given    Orders:  -     spironolactone (ALDACTONE) 25 mg tablet; Take 1 tablet (25 mg total) by mouth daily  -     Basic metabolic panel; Future; Expected date: 2023  -     Ambulatory Referral to Nutrition Services; Future    2  BMI 40 0-44 9, adult [facility])  -     Ambulatory Referral to Nutrition Services; Future         Subjective      72yo male with history of resistant HTN, ALEK, and obesity presenting to clinic for evaluation of BP  Pt continues to have elevated BP in office and at home  Pt was unable to bring his home logs but states that this morning his BP was 196/86 and last night his BP was 157/64 in left hand and 144/71 in right hand  Pt says these values are more or less the same throughout his logs  He is endorsing some concern of neck "vein pulsing" after taking 2 of his BP medications in the evening   He states this happens on occasion and had previously spoken to a doctor about it  "Pulsing" he says is described as the feeling of a sudden internal drop, similar to a bump when driving  He has never lost consciousness, felt dizzy, had pain or changes in sensation when this occurs  Review of Systems   Constitutional: Negative for chills and fever  HENT: Negative for congestion, rhinorrhea, sore throat and trouble swallowing  Eyes: Negative for visual disturbance  Respiratory: Negative for cough and shortness of breath  Cardiovascular: Negative for chest pain, palpitations and leg swelling  Gastrointestinal: Negative for abdominal pain, blood in stool, constipation, diarrhea, nausea and vomiting  Genitourinary: Negative for dysuria and hematuria  Skin: Negative for color change and rash  Neurological: Positive for light-headedness and headaches  Negative for dizziness, seizures, syncope, weakness and numbness  Current Outpatient Medications on File Prior to Visit   Medication Sig   • carvedilol (COREG) 25 mg tablet Take 1 5 tablets (37 5 mg total) by mouth 2 (two) times a day with meals   • chlorthalidone (HYGROTEN) 50 MG tablet Take 1 tablet (50 mg total) by mouth daily   • lisinopril (ZESTRIL) 40 mg tablet TAKE ONE TABLET BY MOUTH ONCE DAILY AT BEDTIME   • NIFEdipine (PROCARDIA XL) 30 mg 24 hr tablet Take 1 tablet (30 mg total) by mouth every evening   • NIFEdipine (PROCARDIA XL) 60 mg 24 hr tablet Take 1 tablet (60 mg total) by mouth every morning   • rosuvastatin (CRESTOR) 10 MG tablet TAKE ONE TABLET BY MOUTH ONCE DAILY AT BEDTIME   • [DISCONTINUED] nadolol (CORGARD) 80 MG tablet TAKE ONE TABLET BY MOUTH ONCE DAILY       Objective     BP (!) 186/90 (BP Location: Left arm, Patient Position: Sitting, Cuff Size: Standard)   Pulse 80   Temp 97 8 °F (36 6 °C) (Temporal)   Resp 18   Ht 5' 9" (1 753 m)   Wt 134 kg (295 lb 11 2 oz)   SpO2 98%   BMI 43 67 kg/m²     Physical Exam  Vitals and nursing note reviewed     Constitutional:       General: He is not in acute distress  Appearance: Normal appearance  He is obese  He is not ill-appearing, toxic-appearing or diaphoretic  HENT:      Head: Normocephalic and atraumatic  Nose: Nose normal    Eyes:      General: No scleral icterus  Right eye: No discharge  Left eye: No discharge  Conjunctiva/sclera: Conjunctivae normal    Neck:      Vascular: No carotid bruit  Cardiovascular:      Rate and Rhythm: Normal rate and regular rhythm  Pulses: Normal pulses  Heart sounds: Normal heart sounds  No murmur heard  No friction rub  No gallop  Pulmonary:      Effort: Pulmonary effort is normal  No respiratory distress  Breath sounds: Normal breath sounds  No stridor  No wheezing, rhonchi or rales  Chest:      Chest wall: No tenderness  Musculoskeletal:         General: Normal range of motion  Cervical back: Normal range of motion and neck supple  No rigidity or tenderness  Right lower leg: No edema  Left lower leg: No edema  Lymphadenopathy:      Cervical: No cervical adenopathy  Skin:     General: Skin is warm and dry  Capillary Refill: Capillary refill takes less than 2 seconds  Coloration: Skin is not jaundiced or pale  Neurological:      General: No focal deficit present  Mental Status: He is alert and oriented to person, place, and time  Sensory: No sensory deficit  Motor: No weakness     Psychiatric:         Mood and Affect: Mood normal          Behavior: Behavior normal        Ronn Dubose MD

## 2023-02-14 DIAGNOSIS — I10 RESISTANT HYPERTENSION: ICD-10-CM

## 2023-02-15 RX ORDER — SPIRONOLACTONE 25 MG/1
TABLET ORAL
Qty: 30 TABLET | Refills: 0 | Status: SHIPPED | OUTPATIENT
Start: 2023-02-15

## 2023-03-22 DIAGNOSIS — I10 RESISTANT HYPERTENSION: ICD-10-CM

## 2023-03-23 RX ORDER — CHLORTHALIDONE 50 MG/1
TABLET ORAL
Qty: 90 TABLET | Refills: 1 | OUTPATIENT
Start: 2023-03-23

## 2023-04-25 DIAGNOSIS — I10 ESSENTIAL HYPERTENSION: ICD-10-CM

## 2023-04-26 RX ORDER — LISINOPRIL 40 MG/1
TABLET ORAL
Qty: 90 TABLET | Refills: 0 | Status: SHIPPED | OUTPATIENT
Start: 2023-04-26

## 2023-05-30 DIAGNOSIS — I10 RESISTANT HYPERTENSION: ICD-10-CM

## 2023-05-31 RX ORDER — SPIRONOLACTONE 25 MG/1
TABLET ORAL
Qty: 90 TABLET | Refills: 0 | Status: SHIPPED | OUTPATIENT
Start: 2023-05-31

## 2023-07-03 ENCOUNTER — OFFICE VISIT (OUTPATIENT)
Dept: FAMILY MEDICINE CLINIC | Facility: CLINIC | Age: 68
End: 2023-07-03

## 2023-07-03 VITALS
DIASTOLIC BLOOD PRESSURE: 80 MMHG | SYSTOLIC BLOOD PRESSURE: 170 MMHG | HEIGHT: 69 IN | WEIGHT: 292 LBS | BODY MASS INDEX: 43.25 KG/M2 | HEART RATE: 73 BPM | OXYGEN SATURATION: 99 % | TEMPERATURE: 98.5 F | RESPIRATION RATE: 16 BRPM

## 2023-07-03 DIAGNOSIS — Z12.11 ENCOUNTER FOR SCREENING COLONOSCOPY: ICD-10-CM

## 2023-07-03 DIAGNOSIS — I10 RESISTANT HYPERTENSION: Primary | ICD-10-CM

## 2023-07-03 PROCEDURE — 3077F SYST BP >= 140 MM HG: CPT | Performed by: FAMILY MEDICINE

## 2023-07-03 PROCEDURE — 3079F DIAST BP 80-89 MM HG: CPT | Performed by: FAMILY MEDICINE

## 2023-07-03 PROCEDURE — 99213 OFFICE O/P EST LOW 20 MIN: CPT | Performed by: FAMILY MEDICINE

## 2023-07-03 RX ORDER — NIFEDIPINE 30 MG/1
30 TABLET, EXTENDED RELEASE ORAL EVERY EVENING
Qty: 90 TABLET | Refills: 3 | Status: SHIPPED | OUTPATIENT
Start: 2023-07-03

## 2023-07-03 RX ORDER — CARVEDILOL 25 MG/1
37.5 TABLET ORAL 2 TIMES DAILY WITH MEALS
Qty: 180 TABLET | Refills: 3 | Status: SHIPPED | OUTPATIENT
Start: 2023-07-03

## 2023-07-03 RX ORDER — CHLORTHALIDONE 50 MG/1
50 TABLET ORAL DAILY
Qty: 90 TABLET | Refills: 0 | Status: SHIPPED | OUTPATIENT
Start: 2023-07-03 | End: 2023-10-01

## 2023-07-03 NOTE — ASSESSMENT & PLAN NOTE
BP Readings from Last 3 Encounters:   07/03/23 170/80   01/18/23 (!) 186/90   12/30/22 (!) 172/82     BP not at goal. Has not been taking BP medication for the past 2-3 months. States his prescriptions were not being delivered as they were supposed to. Continues taking lisinopril 40mg daily. Will send refill for remaining 3 agents and advised to contact cardiology for further management.

## 2023-07-03 NOTE — PROGRESS NOTES
Name: Esequiel Morfin      : 1955      MRN: [id number]  Encounter Provider: Matt Ellsworth MD  Encounter Date: 7/3/2023   Encounter department: [address] [facility]     1. Resistant hypertension  Assessment & Plan:  BP Readings from Last 3 Encounters:   23 170/80   23 (!) 186/90   22 (!) 172/82     BP not at goal. Has not been taking BP medication for the past 2-3 months. States his prescriptions were not being delivered as they were supposed to. Continues taking lisinopril 40mg daily. Will send refill for remaining 3 agents and advised to contact cardiology for further management. Orders:  -     chlorthalidone (HYGROTEN) 50 MG tablet; Take 1 tablet (50 mg total) by mouth daily  -     carvedilol (COREG) 25 mg tablet; Take 1.5 tablets (37.5 mg total) by mouth 2 (two) times a day with meals  -     NIFEdipine (PROCARDIA XL) 30 mg 24 hr tablet; Take 1 tablet (30 mg total) by mouth every evening    2. Encounter for screening colonoscopy  Assessment & Plan:  Referral placed to GI for colonoscopy. Orders:  -     Ambulatory Referral to Gastroenterology; Future         Subjective      71yo male presenting to clinic for follow up of HTN. Pt states he has only been taking lisinopril as the other 3 agents were not being delivered to his pharmacy. Other than this medication discrepency, he has no other concerns. Review of Systems   Constitutional: Negative for chills and fever. Respiratory: Negative for cough and shortness of breath. Cardiovascular: Negative for chest pain. Gastrointestinal: Negative for abdominal pain, blood in stool, constipation, diarrhea, nausea and vomiting. Genitourinary: Negative for dysuria and hematuria. Neurological: Negative for dizziness, syncope, weakness and headaches.        Current Outpatient Medications on File Prior to Visit   Medication Sig   • lisinopril (ZESTRIL) 40 mg tablet TAKE ONE TABLET BY MOUTH ONCE DAILY AT BEDTIME   • NIFEdipine (PROCARDIA XL) 60 mg 24 hr tablet Take 1 tablet (60 mg total) by mouth every morning   • rosuvastatin (CRESTOR) 10 MG tablet Take 1 tablet (10 mg total) by mouth daily at bedtime   • spironolactone (ALDACTONE) 25 mg tablet TAKE ONE TABLET BY MOUTH ONCE DAILY   • [DISCONTINUED] carvedilol (COREG) 25 mg tablet Take 1.5 tablets (37.5 mg total) by mouth 2 (two) times a day with meals   • [DISCONTINUED] chlorthalidone (HYGROTEN) 50 MG tablet Take 1 tablet (50 mg total) by mouth daily   • [DISCONTINUED] nadolol (CORGARD) 80 MG tablet TAKE ONE TABLET BY MOUTH ONCE DAILY   • [DISCONTINUED] NIFEdipine (PROCARDIA XL) 30 mg 24 hr tablet Take 1 tablet (30 mg total) by mouth every evening       Objective     /80 (BP Location: Right arm, Patient Position: Sitting, Cuff Size: Large)   Pulse 73   Temp 98.5 °F (36.9 °C) (Temporal)   Resp 16   Ht 5' 9" (1.753 m)   Wt 132 kg (292 lb)   SpO2 99%   BMI 43.12 kg/m²     Physical Exam  Vitals and nursing note reviewed. Constitutional:       Appearance: Normal appearance. Eyes:      Conjunctiva/sclera: Conjunctivae normal.   Cardiovascular:      Rate and Rhythm: Normal rate and regular rhythm. Pulses: Normal pulses. Heart sounds: Normal heart sounds. Pulmonary:      Effort: Pulmonary effort is normal.      Breath sounds: Normal breath sounds. Abdominal:      Palpations: Abdomen is soft. Musculoskeletal:         General: Normal range of motion. Right lower le+ Pitting Edema present. Left lower le+ Pitting Edema present. Skin:     General: Skin is warm and dry. Capillary Refill: Capillary refill takes less than 2 seconds. Neurological:      General: No focal deficit present. Mental Status: He is alert.    Psychiatric:         Mood and Affect: Mood normal.         Behavior: Behavior normal.       Rula Jones MD

## 2023-07-18 DIAGNOSIS — E78.6 LOW HDL (UNDER 40): ICD-10-CM

## 2023-07-18 DIAGNOSIS — I10 ESSENTIAL HYPERTENSION: ICD-10-CM

## 2023-07-18 DIAGNOSIS — I10 RESISTANT HYPERTENSION: ICD-10-CM

## 2023-07-20 RX ORDER — LISINOPRIL 40 MG/1
TABLET ORAL
Qty: 90 TABLET | Refills: 0 | OUTPATIENT
Start: 2023-07-20

## 2023-07-21 RX ORDER — SPIRONOLACTONE 25 MG/1
TABLET ORAL
Qty: 90 TABLET | Refills: 0 | Status: SHIPPED | OUTPATIENT
Start: 2023-07-21

## 2023-07-21 RX ORDER — ROSUVASTATIN CALCIUM 10 MG/1
TABLET, COATED ORAL
Qty: 90 TABLET | Refills: 0 | Status: SHIPPED | OUTPATIENT
Start: 2023-07-21

## 2023-08-17 ENCOUNTER — TELEPHONE (OUTPATIENT)
Dept: FAMILY MEDICINE CLINIC | Facility: CLINIC | Age: 68
End: 2023-08-17

## 2023-08-23 DIAGNOSIS — I10 ESSENTIAL HYPERTENSION: ICD-10-CM

## 2023-08-24 RX ORDER — LISINOPRIL 40 MG/1
TABLET ORAL
Qty: 90 TABLET | Refills: 0 | Status: SHIPPED | OUTPATIENT
Start: 2023-08-24

## 2023-08-28 NOTE — TELEPHONE ENCOUNTER
08/28/23 2:08 PM        The office's request has been received, reviewed, and the patient chart updated. The PCP has successfully been removed with a patient attribution note. This message will now be completed.         Thank you  Eric Don

## 2023-11-06 DIAGNOSIS — E78.6 LOW HDL (UNDER 40): ICD-10-CM

## 2023-11-06 DIAGNOSIS — I10 ESSENTIAL HYPERTENSION: ICD-10-CM

## 2023-11-06 DIAGNOSIS — I1A.0 RESISTANT HYPERTENSION: ICD-10-CM

## 2023-11-08 RX ORDER — LISINOPRIL 40 MG/1
TABLET ORAL
Qty: 90 TABLET | Refills: 0 | Status: SHIPPED | OUTPATIENT
Start: 2023-11-08

## 2023-11-08 RX ORDER — ROSUVASTATIN CALCIUM 10 MG/1
TABLET, COATED ORAL
Qty: 90 TABLET | Refills: 0 | Status: SHIPPED | OUTPATIENT
Start: 2023-11-08

## 2023-11-08 RX ORDER — SPIRONOLACTONE 25 MG/1
TABLET ORAL
Qty: 90 TABLET | Refills: 0 | Status: SHIPPED | OUTPATIENT
Start: 2023-11-08

## 2024-01-15 DIAGNOSIS — I10 ESSENTIAL HYPERTENSION: ICD-10-CM

## 2024-01-15 DIAGNOSIS — I1A.0 RESISTANT HYPERTENSION: ICD-10-CM

## 2024-01-16 RX ORDER — CARVEDILOL 25 MG/1
TABLET ORAL
Qty: 180 TABLET | Refills: 2 | Status: SHIPPED | OUTPATIENT
Start: 2024-01-16

## 2024-01-16 RX ORDER — SPIRONOLACTONE 25 MG/1
TABLET ORAL
Qty: 90 TABLET | Refills: 0 | Status: SHIPPED | OUTPATIENT
Start: 2024-01-16

## 2024-01-16 RX ORDER — LISINOPRIL 40 MG/1
TABLET ORAL
Qty: 90 TABLET | Refills: 0 | Status: SHIPPED | OUTPATIENT
Start: 2024-01-16

## 2024-02-27 DIAGNOSIS — E78.6 LOW HDL (UNDER 40): ICD-10-CM

## 2024-02-27 RX ORDER — ROSUVASTATIN CALCIUM 10 MG/1
TABLET, COATED ORAL
Qty: 90 TABLET | Refills: 0 | Status: SHIPPED | OUTPATIENT
Start: 2024-02-27

## 2024-04-17 LAB — HBA1C MFR BLD HPLC: 6.3 %

## 2024-05-08 DIAGNOSIS — I10 ESSENTIAL HYPERTENSION: ICD-10-CM

## 2024-05-20 RX ORDER — LISINOPRIL 40 MG/1
TABLET ORAL
Qty: 90 TABLET | Refills: 1 | Status: SHIPPED | OUTPATIENT
Start: 2024-05-20

## 2024-05-21 ENCOUNTER — OFFICE VISIT (OUTPATIENT)
Dept: NEPHROLOGY | Facility: CLINIC | Age: 69
End: 2024-05-21
Payer: COMMERCIAL

## 2024-05-21 ENCOUNTER — TELEPHONE (OUTPATIENT)
Dept: NEPHROLOGY | Facility: CLINIC | Age: 69
End: 2024-05-21

## 2024-05-21 VITALS
DIASTOLIC BLOOD PRESSURE: 78 MMHG | WEIGHT: 307 LBS | HEIGHT: 69 IN | SYSTOLIC BLOOD PRESSURE: 190 MMHG | BODY MASS INDEX: 45.47 KG/M2

## 2024-05-21 DIAGNOSIS — I10 ESSENTIAL HYPERTENSION: Primary | ICD-10-CM

## 2024-05-21 DIAGNOSIS — R80.9 MICROALBUMINURIA: ICD-10-CM

## 2024-05-21 DIAGNOSIS — K55.1 STENOSIS OF INFERIOR MESENTERIC ARTERY (HCC): ICD-10-CM

## 2024-05-21 PROCEDURE — 99204 OFFICE O/P NEW MOD 45 MIN: CPT | Performed by: INTERNAL MEDICINE

## 2024-05-21 RX ORDER — AMLODIPINE BESYLATE 5 MG/1
5 TABLET ORAL DAILY
COMMUNITY
End: 2024-05-21 | Stop reason: SDUPTHER

## 2024-05-21 RX ORDER — AMLODIPINE BESYLATE 5 MG/1
5 TABLET ORAL 2 TIMES DAILY
Qty: 180 TABLET | Refills: 3 | Status: SHIPPED | OUTPATIENT
Start: 2024-05-21

## 2024-05-21 NOTE — PROGRESS NOTES
NEPHROLOGY OUTPATIENT CONSULTATION   Shaw Giron 69 y.o. male MRN: [id number]  Date: 5/21/2024  Reason for consultation:   Chief Complaint   Patient presents with    Consult    Hypertension     ASSESSMENT AND PLAN:  Uncontrolled hypertension  -Prior secondary workup in 2022 show serum aldosterone 6.3, ARR 10.8, 24-hour urine metanephrine/normetanephrine unremarkable  -Renal artery Doppler in 2022 shows no significant BASHIR  -Based on chart review, patient was on nifedipine XL, chlorthalidone, carvedilol in the past.  He stopped taking carvedilol and chlorthalidone on his own due to having chest pain issues, fatigue.  No obvious allergic reaction as per patient.  -Apparently amlodipine is listed in his allergy list although he is currently tolerating amlodipine well without any issues.  -His current regimen includes spironolactone 25 mg daily, lisinopril 40 mg daily, will increase amlodipine from 5 mg daily to twice daily dosing.  -His home BP readings are generally 140s to 150s/60s to 80s.  Advised him to bring BP machine during next office visit.  -Will have him come back in 2 to 3 weeks for blood pressure check  -If blood pressure is not adequately controlled, may have to add chlorthalidone.  Discussed this with patient and he is agreeable to try this if needed.  -If blood pressure is not adequately controlled despite optimal antihypertensive regimen, will do repeat renal artery Doppler.  -Prior CT scan in 2019 showed 1.4 cm indeterminant left adrenal nodule.  -Low-salt diet recommended.  -He was told to have sleep apnea although he is not using CPAP, again recommended to discuss this further with PCP.  -Recommend weight loss program, regular exercise  -Morbid obesity also contributing to elevated BP.    Renal function overall stable with last serum creatinine 0.9 in August 2023.  -No recent labs available.  Check BMP, UA with microscopy, UACR now    Microalbuminuria  -UACR 48 mg in 2022.  No repeat values  "available since then.  -Suspect secondary to uncontrolled hypertension for many years  -Currently on lisinopril and Aldactone as above.]  -Last hemoglobin A1c 6.2 in August 2023  -Repeat UACR now    HISTORY OF PRESENT ILLNESS:  Shaw Giron is a 69 y.o. year old male with medical issues of hypertension since 1999, prediabetes, morbid obesity, ?sleep apnea, hyperlipidemia, who presents for initial consultation for hypertension.  Old medical records including prior lab values were reviewed from [facility]'s records.  Patient has long-term hypertension history.  Overall uncontrolled hypertension for a long time.  His home BP readings are 140s to 150s/60s to 80s.  He is primarily Ivorian-speaking,  line #[id number] ([employer]) used to help with translation.    Patient denies any NSAID use.  He stopped taking chlorthalidone and carvedilol on his own due to having fatigue, chest pain issues.  Apparently amlodipine is listed in allergy list but patient is tolerating amlodipine well without any issues.  Denies any urinary complaint.  Denies any chest pain, shortness of breath, nausea, vomiting.  Denies any headache, lightheadedness.    Family history includes father and mother both on dialysis.  Father had prostate carcinoma and from what it seems based on patient describing he may have had obstructive nephropathy and eventually requiring dialysis.  Exact etiology for kidney disease for his mom unclear.    REVIEW OF SYSTEMS:    More than 10 point review of systems were obtained and discussed in length with the patient. Complete review of systems were negative / unremarkable except mentioned in the HPI section.      PHYSICAL EXAM:  Vitals:    05/21/24 1520 05/21/24 1546   BP:  (!) 190/78   Weight: (!) 139 kg (307 lb)    Height: 5' 9\" (1.753 m)      Body mass index is 45.34 kg/m².    Physical Exam  Vitals reviewed.   Constitutional:       Appearance: He is well-developed. He is obese.   HENT:      Head: " Normocephalic and atraumatic.      Right Ear: External ear normal.      Left Ear: External ear normal.   Eyes:      General: No scleral icterus.     Conjunctiva/sclera: Conjunctivae normal.   Cardiovascular:      Comments: No significant edema in legs  Pulmonary:      Effort: Pulmonary effort is normal. No respiratory distress.      Breath sounds: Normal breath sounds. No wheezing or rales.   Abdominal:      General: Bowel sounds are normal. There is no distension.      Palpations: Abdomen is soft.      Tenderness: There is no abdominal tenderness.   Musculoskeletal:         General: No deformity.   Lymphadenopathy:      Cervical: No cervical adenopathy.   Skin:     Findings: No rash.   Neurological:      Mental Status: He is alert and oriented to person, place, and time.   Psychiatric:         Behavior: Behavior normal.         PAST MEDICAL HISTORY:  Past Medical History:   Diagnosis Date    Hypertension     Prediabetes        PAST SURGICAL HISTORY:  History reviewed. No pertinent surgical history.    ALLERGIES:  Allergies   Allergen Reactions    Amlodipine Other (See Comments)     Nightmares and difficulty sleeping       SOCIAL HISTORY:  Social History     Substance and Sexual Activity   Alcohol Use Never     Social History     Substance and Sexual Activity   Drug Use Never     Social History     Tobacco Use   Smoking Status Never   Smokeless Tobacco Never   Tobacco Comments    only tried one cigarette when teenager       FAMILY HISTORY:  Family History   Problem Relation Age of Onset    Hypertension Mother     Coronary artery disease Mother     Hypertension Father     Coronary artery disease Father     Hypertension Sister     Coronary artery disease Sister     Hypertension Brother     Coronary artery disease Brother     Hypertension Paternal Uncle     Stroke Paternal Uncle        MEDICATIONS:    Current Outpatient Medications:     amLODIPine (NORVASC) 5 mg tablet, Take 1 tablet (5 mg total) by mouth 2 (two) times  "a day, Disp: 180 tablet, Rfl: 3    lisinopril (ZESTRIL) 40 mg tablet, TAKE ONE TABLET BY MOUTH ONCE DAILY AT BEDTIME, Disp: 90 tablet, Rfl: 1    rosuvastatin (CRESTOR) 10 MG tablet, TAKE ONE TABLET BY MOUTH ONCE DAILY AT BEDTIME, Disp: 90 tablet, Rfl: 0    spironolactone (ALDACTONE) 25 mg tablet, TAKE ONE TABLET BY MOUTH ONCE DAILY, Disp: 90 tablet, Rfl: 0    Lab Results:   Creatinine 0.9    Portions of the record may have been created with voice recognition software. Occasional wrong word or \"sound a like\" substitutions may have occurred due to the inherent limitations of voice recognition software. Read the chart carefully and recognize, using context, where substitutions have occurred.  "

## 2024-05-23 ENCOUNTER — TELEPHONE (OUTPATIENT)
Dept: NEPHROLOGY | Facility: CLINIC | Age: 69
End: 2024-05-23

## 2024-05-23 NOTE — TELEPHONE ENCOUNTER
----- Message from LORE Aggarwal sent at 5/23/2024 10:29 AM EDT -----  Sure  ----- Message -----  From: Destinee David  Sent: 5/21/2024   4:04 PM EDT  To: LORE Jesus; MD Salvador Manzanares,    On 6/11/2024 are you okay with seeing Shaw for a BP check at 1:30 Pm in [place] . Pt came in today for HTN  just wanted him seen earlier then 3 months as BP was 190 in office today.

## 2024-05-31 LAB
CREAT ?TM UR-SCNC: 108 UMOL/L
EXT ALBUMIN URINE RANDOM: 0.7
MICROALBUMIN/CREAT UR: 6 MG/G{CREAT}

## 2024-06-03 ENCOUNTER — TELEPHONE (OUTPATIENT)
Dept: NEPHROLOGY | Facility: HOSPITAL | Age: 69
End: 2024-06-03

## 2024-06-04 ENCOUNTER — OFFICE VISIT (OUTPATIENT)
Dept: CARDIOLOGY CLINIC | Facility: CLINIC | Age: 69
End: 2024-06-04
Payer: COMMERCIAL

## 2024-06-04 ENCOUNTER — HOSPITAL ENCOUNTER (OUTPATIENT)
Dept: NON INVASIVE DIAGNOSTICS | Facility: HOSPITAL | Age: 69
Discharge: HOME | End: 2024-06-04
Payer: COMMERCIAL

## 2024-06-04 VITALS
DIASTOLIC BLOOD PRESSURE: 76 MMHG | WEIGHT: 302.8 LBS | BODY MASS INDEX: 44.85 KG/M2 | SYSTOLIC BLOOD PRESSURE: 178 MMHG | HEART RATE: 66 BPM | HEIGHT: 69 IN

## 2024-06-04 DIAGNOSIS — I1A.0 RESISTANT HYPERTENSION: ICD-10-CM

## 2024-06-04 DIAGNOSIS — I72.8 ANEURYSM OF OTHER SPECIFIED ARTERIES (HCC): ICD-10-CM

## 2024-06-04 DIAGNOSIS — I1A.0 RESISTANT HYPERTENSION: Primary | ICD-10-CM

## 2024-06-04 DIAGNOSIS — I25.84 CORONARY ATHEROSCLEROSIS DUE TO CALCIFIED CORONARY LESION (CODE): ICD-10-CM

## 2024-06-04 DIAGNOSIS — I10 ESSENTIAL HYPERTENSION: ICD-10-CM

## 2024-06-04 DIAGNOSIS — I11.9 HYPERTENSIVE HEART DISEASE WITHOUT HEART FAILURE: ICD-10-CM

## 2024-06-04 DIAGNOSIS — I77.819 AORTIC ECTASIA, UNSPECIFIED SITE (HCC): ICD-10-CM

## 2024-06-04 PROCEDURE — 93000 ELECTROCARDIOGRAM COMPLETE: CPT

## 2024-06-04 PROCEDURE — 93880 EXTRACRANIAL BILAT STUDY: CPT

## 2024-06-04 PROCEDURE — 99204 OFFICE O/P NEW MOD 45 MIN: CPT

## 2024-06-04 PROCEDURE — 93880 EXTRACRANIAL BILAT STUDY: CPT | Performed by: SURGERY

## 2024-06-04 NOTE — PROGRESS NOTES
Cardiology Consultation   MD Ari Milian MD, FACC  Rodrigo Ballesteros DO, [facility] FACP Ather Mansoor, MD Rujul Patel, DO, [facility]  Camilo Slater DO, [facility]  -------------------------------------------------------------------  [facility]  [address] [place] [address]  Phone: [phone]  Fax: [phone]  06/04/24  Shaw Giron  YOB: 1955   MRN: [id number]      Referring Physician: No referring provider defined for this encounter.     HPI:  I am seeing this patient in cardiology consultation for: Resistant hypertension, hypertensive heart disease    Shaw Giron is a 69 y.o. male with:   Hypertension  Hypertensive heart disease without heart failure  Obstructive sleep apnea  Obesity  Prediabetes    Tobacco: None  Alcohol: None  Drugs: None      He presents today for evaluation with cardiology.  He has obesity, prediabetes and significant hypertension which has been very difficult to control.  He just saw nephrology for his long-term hypertension history.  I spoke to him today with the help of the [employer] .  He was just seen by nephrology, renal function has been stable, prior workup in the past showed unremarkable metanephrines and no significant renal artery stenosis.  He is currently on spironolactone, lisinopril and amlodipine which was just increased from 5 mg/day to 5 mg twice per day.  He denies any chest pain or significant shortness of breath.  He is not using a CPAP currently but was recommended for 1 in the past    Review of Systems   Constitutional:  Negative for chills and fever.   HENT:  Negative for facial swelling and sore throat.    Eyes:  Negative for visual disturbance.   Respiratory:  Negative for cough, chest tightness, shortness of breath and wheezing.    Cardiovascular:  Negative for chest pain, palpitations and leg swelling.   Gastrointestinal:  Negative for abdominal pain, blood  in stool, constipation, diarrhea, nausea and vomiting.   Endocrine: Negative for cold intolerance and heat intolerance.   Genitourinary:  Negative for decreased urine volume, difficulty urinating, dysuria and hematuria.   Musculoskeletal:  Negative for arthralgias, back pain and myalgias.   Skin:  Negative for rash.   Neurological:  Negative for dizziness, syncope, weakness and numbness.   Psychiatric/Behavioral:  Negative for agitation, behavioral problems and confusion. The patient is not nervous/anxious.         OBJECTIVE  Vitals:    06/04/24 1551   BP: (!) 178/76   Pulse: 66       Physical Exam   Constitutional: awake, alert and oriented, in no acute distress, no obvious deformities, obese male  Head: Normocephalic, without obvious abnormality, atraumatic  Eyes: conjunctivae clear and moist. Sclera anicteric. No xanthelasmas. Pupils equal bilaterally. Extraocular motions are full.  Ear nose mouth and throat: ears are symmetrical bilaterally, hearing appears to be equal bilaterally, no nasal discharge or epistaxis, oropharynx is clear with moist mucous membranes  Neck: Trachea is midline, neck is supple, no thyromegaly or significant lymphadenopathy, there is full range of motion.  Lungs: clear to auscultation bilaterally, no wheezes, no rales, no rhonchi, no accessory muscle use, breathing is nonlabored  Heart: regular rate and rhythm, S1, S2 normal, no murmur, no click, no rub and no gallop, no lower extremity edema  Abdomen: Obese, soft, non-tender; bowel sounds normal; no masses, no organomegaly  Psychiatric: Patient is oriented to time, place, person, mood/affect is negative for depression, anxiety, agitation, appears to have appropriate insight  Skin: Skin is warm, dry, intact. No obvious rashes or lesions on exposed extremities. Nail beds are pink with no cyanosis or clubbing.    EKG:  Results for orders placed or performed in visit on 06/04/24   POCT ECG    Impression    Normal sinus rhythm with  incomplete right manage block and minimal voltage criteria for LVH        The 10-year ASCVD risk score (Kelly KRAMER, et al., 2019) is: 29.6%    Values used to calculate the score:      Age: 69 years      Sex: Male      Is Non- : No      Diabetic: No      Tobacco smoker: No      Systolic Blood Pressure: 178 mmHg      Is BP treated: Yes      HDL Cholesterol: 36 mg/dL      Total Cholesterol: 142 mg/dL    IMPRESSION:  Hypertension  Hypertensive heart disease without heart failure  Obstructive sleep apnea  Obesity  Prediabetes    Echo    Left Ventricle: Left ventricular cavity size is normal. Wall thickness is mildly increased. There is mild concentric hypertrophy. The left ventricular ejection fraction is 60%. Systolic function is normal. Wall motion is normal. Diastolic function is normal.  •  Left Atrium: The atrium is mildly dilated.  •  Right Atrium: The atrium is mildly dilated.  •  Mitral Valve: There is mild annular calcification.    DISCUSSION/RECOMMENDATIONS:  He presents today for evaluation with cardiology for his hypertensive heart disease  His ECG does show minimal voltage criteria for LVH, will plan for further evaluation with echo to evaluate for hypertensive heart disease and rule out significant hypertrophic cardiomyopathy  He is following with nephrology who is managing his blood pressure medications.  Will continue with amlodipine total of 10 mg/day but taken as 5 mg twice a day, lisinopril 40, spironolactone 25  BP is still elevated however, may need another medication such as a thiazide like diuretic, however would defer to nephrology  Will plan for follow-up after echo    Camilo Slater DO, [facility], [facility]  --------------------------------------------------------------------------------  TREADMILL STRESS  No results found for this or any previous visit.     ----------------------------------------------------------------------------------------------  NUCLEAR STRESS TEST: No  results found for this or any previous visit.    No results found for this or any previous visit.      --------------------------------------------------------------------------------  CATH:  No results found for this or any previous visit.    --------------------------------------------------------------------------------  ECHO:   No results found for this or any previous visit.    No results found for this or any previous visit.    --------------------------------------------------------------------------------  HOLTER  No results found for this or any previous visit.    --------------------------------------------------------------------------------  CAROTIDS  No results found for this or any previous visit.       Diagnoses and all orders for this visit:    Resistant hypertension  -     POCT ECG  -     Echo complete w/ contrast if indicated; Future    Hypertensive heart disease without heart failure  -     Echo complete w/ contrast if indicated; Future    Essential hypertension    BMI 40.0-44.9, adult ([facility])       ======================================================    Past Medical History:   Diagnosis Date   • Hypertension    • Prediabetes      History reviewed. No pertinent surgical history.      Medications  Current Outpatient Medications   Medication Sig Dispense Refill   • amLODIPine (NORVASC) 5 mg tablet Take 1 tablet (5 mg total) by mouth 2 (two) times a day 180 tablet 3   • lisinopril (ZESTRIL) 40 mg tablet TAKE ONE TABLET BY MOUTH ONCE DAILY AT BEDTIME 90 tablet 1   • rosuvastatin (CRESTOR) 10 MG tablet TAKE ONE TABLET BY MOUTH ONCE DAILY AT BEDTIME 90 tablet 0   • spironolactone (ALDACTONE) 25 mg tablet TAKE ONE TABLET BY MOUTH ONCE DAILY (Patient not taking: Reported on 6/4/2024) 90 tablet 0     No current facility-administered medications for this visit.        Allergies   Allergen Reactions   • Amlodipine Other (See Comments)     Nightmares and difficulty sleeping       Social History      Socioeconomic History   • Marital status: /Civil Union     Spouse name: Not on file   • Number of children: Not on file   • Years of education: Not on file   • Highest education level: Not on file   Occupational History   • Not on file   Tobacco Use   • Smoking status: Never   • Smokeless tobacco: Never   • Tobacco comments:     only tried one cigarette when teenager   Substance and Sexual Activity   • Alcohol use: Never   • Drug use: Never   • Sexual activity: Not Currently   Other Topics Concern   • Not on file   Social History Narrative   • Not on file     Social Determinants of Health     Financial Resource Strain: Low Risk  (9/21/2023)    [facility]    Overall Financial Resource Strain (CARDIA)    • Difficulty of Paying Living Expenses: Not hard at all   Food Insecurity: No Food Insecurity (9/21/2023)    [facility]    Hunger Vital Sign    • Worried About Running Out of Food in the Last Year: Never true    • Ran Out of Food in the Last Year: Never true   Transportation Needs: No Transportation Needs (9/21/2023)    [facility]    PRAPARE - Transportation    • Lack of Transportation (Medical): No    • Lack of Transportation (Non-Medical): No   Physical Activity: Not on file   Stress: No Stress Concern Present (9/21/2023)    [facility]    [employer] - Occupational Stress Questionnaire    • Feeling of Stress : Not at all   Social Connections: Moderately Isolated (9/21/2023)    [facility]    Social Connection and Isolation Panel [NHANES]    • Frequency of Communication with Friends and Family: More than three times a week    • Frequency of Social Gatherings with Friends and  Family: More than three times a week    • Attends Congregational Services: Never    • Active Member of Clubs or Organizations: No    • Attends Club or Organization Meetings: Never    • Marital Status:    Intimate Partner Violence: Not At Risk (9/21/2023)    [facility], [facility]    Humiliation, Afraid, Rape, and Kick questionnaire    • Fear of Current or Ex-Partner: No    • Emotionally Abused: No    • Physically Abused: No    • Sexually Abused: No   Housing Stability: Low Risk  (9/21/2023)    [facility] from [facility], [facility]    Housing Stability Vital Sign    • Unable to Pay for Housing in the Last Year: No    • Number of Places Lived in the Last Year: 1    • Unstable Housing in the Last Year: No        Family History   Problem Relation Age of Onset   • Hypertension Mother    • Coronary artery disease Mother    • Hypertension Father    • Coronary artery disease Father    • Hypertension Sister    • Coronary artery disease Sister    • Hypertension Brother    • Coronary artery disease Brother    • Hypertension Paternal Uncle    • Stroke Paternal Uncle        Lab Results   Component Value Date    WBC 11.63 (H) 03/04/2022    HGB 14.8 03/04/2022    HCT 47.4 03/04/2022    MCV 85 03/04/2022     03/04/2022      Lab Results   Component Value Date    SODIUM 143 08/14/2023    K 4.4 08/14/2023     08/14/2023    CO2 28 08/14/2023    BUN 23 08/14/2023    CREATININE 0.97 08/14/2023    GLUC 110 (H) 08/14/2023    CALCIUM 9.4 08/14/2023      Lab Results   Component Value Date    HGBA1C 6.3 04/17/2024      Lab Results   Component Value Date    CHOL 144 05/16/2018     Lab Results   Component Value Date    HDL 36 (L) 04/19/2022    HDL 44 08/03/2021    HDL 32 (L) 03/14/2019     Lab Results   Component Value Date    LDLCALC 88 04/19/2022    LDLCALC 76 08/03/2021    LDLCALC 93 03/14/2019     Lab Results   Component Value Date    TRIG 92  "04/19/2022    TRIG 71 08/03/2021    TRIG 119 03/14/2019     No results found for: \"CHOLHDL\"   No results found for: \"INR\", \"PROTIME\"       Patient Active Problem List    Diagnosis Date Noted   • Microalbuminuria 05/21/2024   • Essential hypertension 05/21/2024   • BMI 40.0-44.9, adult (HCC) 06/12/2022   • ALEK (obstructive sleep apnea) 06/01/2022   • Hypertensive heart disease without heart failure 05/27/2022   • Suspected sleep apnea 05/27/2022   • Low HDL (under 40) 02/04/2021   • Eczema 10/15/2019   • Prediabetes 03/14/2019   • Encounter for screening colonoscopy 03/14/2019   • Hematospermia 07/19/2018   • Resistant hypertension 07/19/2018       Portions of the record may have been created with voice recognition software. Occasional wrong word or \"sound a like\" substitutions may have occurred due to the inherent limitations of voice recognition software. Read the chart carefully and recognize, using context, where substitutions have occurred.    Camilo Slater DO, FACC  6/4/2024 5:30 PM          "

## 2024-06-05 NOTE — TELEPHONE ENCOUNTER
Called patient and spoke in Latvian to ask for the name of the lab he done his labs. Patient is insure of the name and our call disconnected.         Then, Called back and left a voicemail in Latvian to patient to please call our office to let us know the name of lab hi done his labs.   
Called patient and spoke in Turkmen reminding to please complete labwork prior to 6/11 appointment with Teresita. Patient stating that he done labs yesterday but unsure the name of lab he done. Pt will call our office to let us know the name of the lab.       
 used

## 2024-06-11 ENCOUNTER — TELEPHONE (OUTPATIENT)
Dept: NEPHROLOGY | Facility: CLINIC | Age: 69
End: 2024-06-11

## 2024-06-11 NOTE — TELEPHONE ENCOUNTER
Called pt and LVM requesting a call back to reschedule appt today 6/11 with Teresita Lozada in the [facility], due to an emergency w/ provider.

## 2024-06-11 NOTE — TELEPHONE ENCOUNTER
Pt came into the office and was informed about Teresita being out of office. A message was sent to provider by YOAV Felipe for adviosry on rescheduling BP check, as this pt needs an appt ASAP. We will contact pt when we are able to reschedule.

## 2024-06-14 ENCOUNTER — TELEPHONE (OUTPATIENT)
Dept: NEPHROLOGY | Facility: CLINIC | Age: 69
End: 2024-06-14

## 2024-06-26 ENCOUNTER — HOSPITAL ENCOUNTER (OUTPATIENT)
Dept: NON INVASIVE DIAGNOSTICS | Facility: HOSPITAL | Age: 69
Discharge: HOME | End: 2024-06-26
Payer: COMMERCIAL

## 2024-06-26 VITALS
SYSTOLIC BLOOD PRESSURE: 178 MMHG | WEIGHT: 302 LBS | HEIGHT: 69 IN | BODY MASS INDEX: 44.73 KG/M2 | HEART RATE: 71 BPM | DIASTOLIC BLOOD PRESSURE: 77 MMHG

## 2024-06-26 DIAGNOSIS — I1A.0 RESISTANT HYPERTENSION: ICD-10-CM

## 2024-06-26 DIAGNOSIS — I11.9 HYPERTENSIVE HEART DISEASE WITHOUT HEART FAILURE: ICD-10-CM

## 2024-06-26 LAB
AORTIC ROOT: 3.7 CM
AORTIC VALVE MEAN VELOCITY: 7.8 M/S
APICAL FOUR CHAMBER EJECTION FRACTION: 71 %
ASCENDING AORTA: 3.7 CM
AV AREA BY CONTINUOUS VTI: 3.4 CM2
AV AREA PEAK VELOCITY: 3.3 CM2
AV LVOT MEAN GRADIENT: 2 MMHG
AV LVOT PEAK GRADIENT: 3 MMHG
AV MEAN GRADIENT: 3 MMHG
AV PEAK GRADIENT: 6 MMHG
AV VALVE AREA: 3.42 CM2
AV VELOCITY RATIO: 0.73
BSA FOR ECHO PROCEDURE: 2.46 M2
DOP CALC AO PEAK VEL: 1.2 M/S
DOP CALC AO VTI: 25.5 CM
DOP CALC LVOT AREA: 4.52 CM2
DOP CALC LVOT CARDIAC INDEX: 2.27 L/MIN/M2
DOP CALC LVOT CARDIAC OUTPUT: 5.59 L/MIN
DOP CALC LVOT DIAMETER: 2.4 CM
DOP CALC LVOT PEAK VEL VTI: 19.29 CM
DOP CALC LVOT PEAK VEL: 0.87 M/S
DOP CALC LVOT STROKE INDEX: 35 ML/M2
DOP CALC LVOT STROKE VOLUME: 87.22
E WAVE DECELERATION TIME: 270 MS
E/A RATIO: 1.11
FRACTIONAL SHORTENING: 46 (ref 28–44)
INTERVENTRICULAR SEPTUM IN DIASTOLE (PARASTERNAL SHORT AXIS VIEW): 1.3 CM
INTERVENTRICULAR SEPTUM: 1.3 CM (ref 0.6–1.1)
LAAS-AP2: 26.5 CM2
LAAS-AP4: 20.4 CM2
LEFT ATRIUM SIZE: 4.2 CM
LEFT ATRIUM VOLUME (MOD BIPLANE): 72 ML
LEFT ATRIUM VOLUME INDEX (MOD BIPLANE): 29.3 ML/M2
LEFT INTERNAL DIMENSION IN SYSTOLE: 2.7 CM (ref 2.1–4)
LEFT VENTRICULAR INTERNAL DIMENSION IN DIASTOLE: 5 CM (ref 3.5–6)
LEFT VENTRICULAR POSTERIOR WALL IN END DIASTOLE: 1.3 CM
LEFT VENTRICULAR STROKE VOLUME: 89 ML
LVSV (TEICH): 89 ML
MV E'TISSUE VEL-LAT: 14 CM/S
MV E'TISSUE VEL-SEP: 8 CM/S
MV PEAK A VEL: 0.63 M/S
MV PEAK E VEL: 70 CM/S
MV STENOSIS PRESSURE HALF TIME: 78 MS
MV VALVE AREA P 1/2 METHOD: 2.82
RIGHT ATRIAL 2D VOLUME: 48 ML
RIGHT ATRIUM AREA SYSTOLE A4C: 18.6 CM2
RIGHT VENTRICLE ID DIMENSION: 3.7 CM
SL CV LEFT ATRIUM LENGTH A2C: 6.2 CM
SL CV LV EF: 71
SL CV PED ECHO LEFT VENTRICLE DIASTOLIC VOLUME (MOD BIPLANE) 2D: 116 ML
SL CV PED ECHO LEFT VENTRICLE SYSTOLIC VOLUME (MOD BIPLANE) 2D: 27 ML
TRICUSPID ANNULAR PLANE SYSTOLIC EXCURSION: 2.5 CM

## 2024-06-26 PROCEDURE — 93306 TTE W/DOPPLER COMPLETE: CPT | Performed by: STUDENT IN AN ORGANIZED HEALTH CARE EDUCATION/TRAINING PROGRAM

## 2024-06-26 PROCEDURE — C8929 TTE W OR WO FOL WCON,DOPPLER: HCPCS

## 2024-06-26 RX ADMIN — PERFLUTREN 0.6 ML/MIN: 6.52 INJECTION, SUSPENSION INTRAVENOUS at 15:15

## 2024-06-27 ENCOUNTER — TELEPHONE (OUTPATIENT)
Dept: CARDIOLOGY CLINIC | Facility: CLINIC | Age: 69
End: 2024-06-27

## 2024-06-27 NOTE — TELEPHONE ENCOUNTER
----- Message from Camilo Slater DO sent at 6/27/2024  6:25 AM EDT -----  Please call the patient regarding their Echocardiogram results. No significant changes from prior. Plan for follow up at next scheduled appointment.. Continue current medications at the same doses..

## 2024-06-27 NOTE — TELEPHONE ENCOUNTER
Phone call to patient using Yoruba interpretor Umer [id number].  Echo results per Dr. Slater  No significant changes from prior. Plan for follow up at next scheduled appointment.. Continue current medications at the same doses..   Pending office visit 9/9/24    Patient understood results and instructions.

## 2024-08-09 ENCOUNTER — TELEPHONE (OUTPATIENT)
Dept: NEPHROLOGY | Facility: CLINIC | Age: 69
End: 2024-08-09

## 2024-08-09 NOTE — TELEPHONE ENCOUNTER
Called patient and left a voicemail asking to please have blood work drawn before the follow up appointment.

## 2024-09-03 ENCOUNTER — APPOINTMENT (OUTPATIENT)
Dept: LAB | Facility: CLINIC | Age: 69
End: 2024-09-03
Payer: COMMERCIAL

## 2024-09-03 DIAGNOSIS — I10 ESSENTIAL HYPERTENSION: ICD-10-CM

## 2024-09-03 LAB
BILIRUB UR QL STRIP: NEGATIVE
CLARITY UR: CLEAR
COLOR UR: COLORLESS
ERYTHROCYTE [DISTWIDTH] IN BLOOD BY AUTOMATED COUNT: 15.1 % (ref 11.6–15.1)
GLUCOSE UR STRIP-MCNC: NEGATIVE MG/DL
HCT VFR BLD AUTO: 46.8 % (ref 36.5–49.3)
HGB BLD-MCNC: 14.3 G/DL (ref 12–17)
HGB UR QL STRIP.AUTO: NEGATIVE
KETONES UR STRIP-MCNC: NEGATIVE MG/DL
LEUKOCYTE ESTERASE UR QL STRIP: NEGATIVE
MCH RBC QN AUTO: 26.5 PG (ref 26.8–34.3)
MCHC RBC AUTO-ENTMCNC: 30.6 G/DL (ref 31.4–37.4)
MCV RBC AUTO: 87 FL (ref 82–98)
NITRITE UR QL STRIP: NEGATIVE
PH UR STRIP.AUTO: 7 [PH]
PLATELET # BLD AUTO: 300 THOUSANDS/UL (ref 149–390)
PMV BLD AUTO: 10.3 FL (ref 8.9–12.7)
PROT UR STRIP-MCNC: NEGATIVE MG/DL
RBC # BLD AUTO: 5.39 MILLION/UL (ref 3.88–5.62)
SP GR UR STRIP.AUTO: 1.01 (ref 1–1.03)
UROBILINOGEN UR STRIP-ACNC: <2 MG/DL
WBC # BLD AUTO: 12.97 THOUSAND/UL (ref 4.31–10.16)

## 2024-09-03 PROCEDURE — 81003 URINALYSIS AUTO W/O SCOPE: CPT

## 2024-09-03 PROCEDURE — 85027 COMPLETE CBC AUTOMATED: CPT

## 2024-09-03 PROCEDURE — 36415 COLL VENOUS BLD VENIPUNCTURE: CPT

## 2024-09-03 PROCEDURE — 80053 COMPREHEN METABOLIC PANEL: CPT

## 2024-09-03 PROCEDURE — 82043 UR ALBUMIN QUANTITATIVE: CPT

## 2024-09-03 PROCEDURE — 82570 ASSAY OF URINE CREATININE: CPT

## 2024-09-04 LAB
ALBUMIN SERPL BCG-MCNC: 4 G/DL (ref 3.5–5)
ALP SERPL-CCNC: 58 U/L (ref 34–104)
ALT SERPL W P-5'-P-CCNC: 21 U/L (ref 7–52)
ANION GAP SERPL CALCULATED.3IONS-SCNC: 9 MMOL/L (ref 4–13)
AST SERPL W P-5'-P-CCNC: 17 U/L (ref 13–39)
BILIRUB SERPL-MCNC: 0.41 MG/DL (ref 0.2–1)
BUN SERPL-MCNC: 18 MG/DL (ref 5–25)
CALCIUM SERPL-MCNC: 9.3 MG/DL (ref 8.4–10.2)
CHLORIDE SERPL-SCNC: 104 MMOL/L (ref 96–108)
CO2 SERPL-SCNC: 28 MMOL/L (ref 21–32)
CREAT SERPL-MCNC: 0.87 MG/DL (ref 0.6–1.3)
CREAT UR-MCNC: 37.5 MG/DL
GFR SERPL CREATININE-BSD FRML MDRD: 88 ML/MIN/1.73SQ M
GLUCOSE P FAST SERPL-MCNC: 121 MG/DL (ref 65–99)
MICROALBUMIN UR-MCNC: 7.5 MG/L
MICROALBUMIN/CREAT 24H UR: 20 MG/G CREATININE (ref 0–30)
POTASSIUM SERPL-SCNC: 4.4 MMOL/L (ref 3.5–5.3)
PROT SERPL-MCNC: 7 G/DL (ref 6.4–8.4)
SODIUM SERPL-SCNC: 141 MMOL/L (ref 135–147)

## 2024-09-05 ENCOUNTER — TELEPHONE (OUTPATIENT)
Dept: NEPHROLOGY | Facility: CLINIC | Age: 69
End: 2024-09-05

## 2024-09-05 NOTE — TELEPHONE ENCOUNTER
Spoke with patient in Macedonian went over the above message. He expressed understanding. Also, reschedule his follow up and BP appt with Dr. Castañeda on 11/6 at [facility]. No further questions at this time.

## 2024-09-05 NOTE — TELEPHONE ENCOUNTER
----- Message from Alvina Macias PA-C sent at 9/5/2024  2:08 PM EDT -----  Please let patient know recent kidney labs were stable. Please schedule his routine f/u with Dr Castañeda for his BP

## 2024-09-09 ENCOUNTER — OFFICE VISIT (OUTPATIENT)
Dept: CARDIOLOGY CLINIC | Facility: CLINIC | Age: 69
End: 2024-09-09
Payer: COMMERCIAL

## 2024-09-09 VITALS
HEIGHT: 69 IN | BODY MASS INDEX: 45.38 KG/M2 | HEART RATE: 75 BPM | WEIGHT: 306.4 LBS | OXYGEN SATURATION: 99 % | SYSTOLIC BLOOD PRESSURE: 140 MMHG | DIASTOLIC BLOOD PRESSURE: 82 MMHG

## 2024-09-09 DIAGNOSIS — I11.9 HYPERTENSIVE HEART DISEASE WITHOUT HEART FAILURE: ICD-10-CM

## 2024-09-09 DIAGNOSIS — I10 ESSENTIAL HYPERTENSION: ICD-10-CM

## 2024-09-09 DIAGNOSIS — I1A.0 RESISTANT HYPERTENSION: Primary | ICD-10-CM

## 2024-09-09 PROCEDURE — 99214 OFFICE O/P EST MOD 30 MIN: CPT

## 2024-09-09 RX ORDER — TIRZEPATIDE 2.5 MG/.5ML
INJECTION, SOLUTION SUBCUTANEOUS
COMMUNITY
Start: 2024-09-05

## 2024-09-09 RX ORDER — ACETAMINOPHEN 160 MG
TABLET,DISINTEGRATING ORAL
COMMUNITY
Start: 2024-09-05

## 2024-09-09 RX ORDER — PRAVASTATIN SODIUM 10 MG
TABLET ORAL
COMMUNITY
Start: 2024-09-05

## 2024-09-09 RX ORDER — HYDROCHLOROTHIAZIDE 25 MG/1
TABLET ORAL
COMMUNITY
Start: 2024-09-04

## 2024-09-09 NOTE — PROGRESS NOTES
Cardiology   MD Ari Milian MD, FACC  Rodrigo Ballesteros DO, [facility] FACP, FASNC Ather Mansoor, MD Rujul Patel, DO, [facility]  Camilo Slater DO, [facility]  -------------------------------------------------------------------  [facility]  [address] [place] [address]  Phone: [phone]  Fax: [phone]  09/09/24  Shaw Giron  YOB: 1955   MRN: [id number]      Referring Physician: No referring provider defined for this encounter.     HPI: Shaw Giron is a 69 y.o. male with:   Hypertension  Hypertensive heart disease without heart failure  Obstructive sleep apnea  Obesity  Prediabetes    He presents today for follow-up.  Doing well, denies any chest pain or shortness of breath.  Blood pressure is improved today, managed by nephrology    Review of Systems   Constitutional:  Negative for chills and fever.   HENT:  Negative for facial swelling and sore throat.    Eyes:  Negative for visual disturbance.   Respiratory:  Negative for cough, chest tightness, shortness of breath and wheezing.    Cardiovascular:  Negative for chest pain, palpitations and leg swelling.   Gastrointestinal:  Negative for abdominal pain, blood in stool, constipation, diarrhea, nausea and vomiting.   Endocrine: Negative for cold intolerance and heat intolerance.   Genitourinary:  Negative for decreased urine volume, difficulty urinating, dysuria and hematuria.   Musculoskeletal:  Negative for arthralgias, back pain and myalgias.   Skin:  Negative for rash.   Neurological:  Negative for dizziness, syncope, weakness and numbness.   Psychiatric/Behavioral:  Negative for agitation, behavioral problems and confusion. The patient is not nervous/anxious.         OBJECTIVE  Vitals:    09/09/24 1524   BP: 140/82   Pulse: 75   SpO2: 99%       Physical Exam  Constitutional: awake, alert and oriented, in no acute distress, no obvious deformities, obese male  Head:  Normocephalic, without obvious abnormality, atraumatic  Eyes: conjunctivae clear and moist. Sclera anicteric. No xanthelasmas. Pupils equal bilaterally. Extraocular motions are full.  Ear nose mouth and throat: ears are symmetrical bilaterally, hearing appears to be equal bilaterally, no nasal discharge or epistaxis, oropharynx is clear with moist mucous membranes  Neck: Trachea is midline, neck is supple, no thyromegaly or significant lymphadenopathy, there is full range of motion.  Lungs: clear to auscultation bilaterally, no wheezes, no rales, no rhonchi, no accessory muscle use, breathing is nonlabored  Heart: Regular rhythm with a Normal heart rate, S1, S2 normal, No Murmur, no click, rub or gallop, No lower extremity edema  Abdomen: Obese, soft, non-tender; bowel sounds normal; no masses, no organomegaly  Psychiatric: Patient is oriented to time, place, person, mood/affect is negative for depression, anxiety, agitation, appears to have appropriate insight  Skin: Skin is warm, dry, intact. No obvious rashes or lesions on exposed extremities. Nail beds are pink with no cyanosis or clubbing.     EKG:  No results found for this visit on 09/09/24.     IMPRESSION:  Hypertension  Hypertensive heart disease without heart failure  Obstructive sleep apnea  Obesity  Prediabetes    DISCUSSION/RECOMMENDATIONS:  Hypertension  Improved, managed by nephrology.  Continue with amlodipine 10 mg total per day but taken as 5 mg twice a day  Continue lisinopril 40  Continue HCTZ 25  Hypertensive heart disease without heart failure  Stable.  New echo with no significant change from 2 years ago regarding LVH  Obstructive sleep apnea  Obesity  Unchanged.  On Mounjaro currently which may help with the weight loss.  Continue with this  Prediabetes  Stable.  Continue with Jaxon Slater DO, [facility], [facility]  --------------------------------------------------------------------------------  TREADMILL STRESS  No results found for  this or any previous visit.     ----------------------------------------------------------------------------------------------  NUCLEAR STRESS TEST: No results found for this or any previous visit.    No results found for this or any previous visit.      --------------------------------------------------------------------------------  CATH:  No results found for this or any previous visit.    --------------------------------------------------------------------------------  ECHO:   No results found for this or any previous visit.    No results found for this or any previous visit.    --------------------------------------------------------------------------------  HOLTER  No results found for this or any previous visit.    No results found for this or any previous visit.    --------------------------------------------------------------------------------  CAROTIDS  Results for orders placed during the hospital encounter of 06/04/24    VAS carotid complete study    Narrative  [facility] REPORT  CLINICAL:  Indications:  Patient presents with a cervical bruit and multiple cardiovascular risk  factors.  Patient is asymptomatic from a cerebral vascular standpoint.  Operative History:  No cardiovascular surgeries noted.  Risk Factors  The patient has history of Obesity, HTN and Hyperlipidemia.  Clinical  Right Pressure:  186/ mm Hg, Left Pressure:  178/ mm Hg.    FINDINGS:    Right        Impression  PSV  EDV (cm/s)  Direction of Flow  Ratio  Dist. ICA                 62          13                      0.54  Mid. ICA                  71          18                      0.62  Prox. ICA    1 - 49%      70          16                      0.61  Dist CCA                  97          22  Mid CCA                  115          24                      1.00  Prox CCA                 115          22  Ext Carotid              141           9                      1.22  Prox Vert                 50          16   Antegrade  Subclavian               149           0    Left         Impression  PSV  EDV (cm/s)  Direction of Flow  Ratio  Dist. ICA                 75          24                      0.58  Mid. ICA                  70          21                      0.54  Prox. ICA    1 - 49%     101          13                      0.78  Dist CCA                 114          13  Mid CCA                  129          23                      1.02  Prox CCA                 127          21  Ext Carotid              119           0                      0.92  Prox Vert                 50          16  Antegrade  Subclavian               172           0        CONCLUSION:  Impression  RIGHT:  There is <50% stenosis noted in the internal carotid artery. Plaque is  heterogenous and irregular.  Vertebral artery flow is antegrade. There is no significant subclavian artery  disease.  LEFT:  There is <50% stenosis noted in the internal carotid artery. Plaque is  heterogenous and irregular.  Vertebral artery flow is antegrade. There is no significant subclavian artery  disease.    No previous study for comparison.    SIGNATURE:  Electronically Signed by: MARGE OLIVA MD on 2024-06-04 09:36:11 PM     --------------------------------------------------------------------------------  Diagnoses and all orders for this visit:    Resistant hypertension    Hypertensive heart disease without heart failure    Essential hypertension    Other orders  -     Mounjaro 2.5 MG/0.5ML  -     pravastatin (PRAVACHOL) 10 mg tablet  -     hydroCHLOROthiazide 25 mg tablet  -     Cholecalciferol (Vitamin D3) 50 MCG (2000 UT) capsule       ======================================================    Past Medical History:   Diagnosis Date   • Hypertension    • Prediabetes      History reviewed. No pertinent surgical history.      Medications  Current Outpatient Medications   Medication Sig Dispense Refill   • amLODIPine (NORVASC) 5 mg tablet Take 1 tablet (5 mg total) by  mouth 2 (two) times a day 180 tablet 3   • Cholecalciferol (Vitamin D3) 50 MCG (2000 UT) capsule      • hydroCHLOROthiazide 25 mg tablet      • lisinopril (ZESTRIL) 40 mg tablet TAKE ONE TABLET BY MOUTH ONCE DAILY AT BEDTIME 90 tablet 1   • Mounjaro 2.5 MG/0.5ML      • pravastatin (PRAVACHOL) 10 mg tablet      • rosuvastatin (CRESTOR) 10 MG tablet TAKE ONE TABLET BY MOUTH ONCE DAILY AT BEDTIME 90 tablet 0   • spironolactone (ALDACTONE) 25 mg tablet TAKE ONE TABLET BY MOUTH ONCE DAILY (Patient not taking: Reported on 6/4/2024) 90 tablet 0     No current facility-administered medications for this visit.        Allergies   Allergen Reactions   • Amlodipine Other (See Comments)     Nightmares and difficulty sleeping       Social History     Socioeconomic History   • Marital status: /Civil Union     Spouse name: Not on file   • Number of children: Not on file   • Years of education: Not on file   • Highest education level: Not on file   Occupational History   • Not on file   Tobacco Use   • Smoking status: Never   • Smokeless tobacco: Never   • Tobacco comments:     only tried one cigarette when teenager   Substance and Sexual Activity   • Alcohol use: Never   • Drug use: Never   • Sexual activity: Not Currently   Other Topics Concern   • Not on file   Social History Narrative   • Not on file     Social Determinants of Health     Financial Resource Strain: Low Risk  (9/21/2023)    [facility], [facility]    Overall Financial Resource Strain (CARDIA)    • Difficulty of Paying Living Expenses: Not hard at all   Food Insecurity: No Food Insecurity (9/21/2023)    Received from [facility], [facility]    Hunger Vital Sign    • Worried About Running Out of Food in the Last Year: Never true    • Ran Out of Food in the Last Year: Never true   Transportation Needs: No Transportation Needs (9/21/2023)    Received from [facility]  [facility], [facility]    PRAPARE - Transportation    • Lack of Transportation (Medical): No    • Lack of Transportation (Non-Medical): No   Physical Activity: Not on file   Stress: No Stress Concern Present (9/21/2023)    [facility] from [facility], [facility]    [employer] - Occupational Stress Questionnaire    • Feeling of Stress : Not at all   Social Connections: Moderately Isolated (9/21/2023)    Received from [facility], [facility]    Social Connection and Isolation Panel [NHANES]    • Frequency of Communication with Friends and Family: More than three times a week    • Frequency of Social Gatherings with Friends and Family: More than three times a week    • Attends Muslim Services: Never    • Active Member of Clubs or Organizations: No    • Attends Club or Organization Meetings: Never    • Marital Status:    Intimate Partner Violence: Not At Risk (9/21/2023)    Received from [facility], [facility]    Humiliation, Afraid, Rape, and Kick questionnaire    • Fear of Current or Ex-Partner: No    • Emotionally Abused: No    • Physically Abused: No    • Sexually Abused: No   Housing Stability: Low Risk  (9/21/2023)    Received from [facility], [facility]    Housing Stability Vital Sign    • Unable to Pay for Housing in the Last Year: No    • Number of Places Lived in the Last Year: 1    • Unstable Housing in the Last Year: No        Family History   Problem Relation Age of Onset   • Hypertension Mother    • Coronary artery disease Mother    • Hypertension Father    • Coronary artery disease Father    • Hypertension Sister    • Coronary artery disease Sister    • Hypertension Brother    • Coronary artery disease Brother    • Hypertension Paternal Uncle    • Stroke Paternal Uncle        Lab Results   Component Value Date     "WBC 12.97 (H) 09/03/2024    HGB 14.3 09/03/2024    HCT 46.8 09/03/2024    MCV 87 09/03/2024     09/03/2024      Lab Results   Component Value Date    SODIUM 141 09/03/2024    K 4.4 09/03/2024     09/03/2024    CO2 28 09/03/2024    BUN 18 09/03/2024    CREATININE 0.87 09/03/2024    GLUC 110 (H) 08/14/2023    CALCIUM 9.3 09/03/2024      Lab Results   Component Value Date    HGBA1C 6.3 04/17/2024      Lab Results   Component Value Date    CHOL 144 05/16/2018     Lab Results   Component Value Date    HDL 36 (L) 04/19/2022    HDL 44 08/03/2021    HDL 32 (L) 03/14/2019     Lab Results   Component Value Date    LDLCALC 88 04/19/2022    LDLCALC 76 08/03/2021    LDLCALC 93 03/14/2019     Lab Results   Component Value Date    TRIG 92 04/19/2022    TRIG 71 08/03/2021    TRIG 119 03/14/2019     No results found for: \"CHOLHDL\"   No results found for: \"INR\", \"PROTIME\"       Patient Active Problem List    Diagnosis Date Noted   • Microalbuminuria 05/21/2024   • Essential hypertension 05/21/2024   • BMI 40.0-44.9, adult (HCC) 06/12/2022   • ALEK (obstructive sleep apnea) 06/01/2022   • Hypertensive heart disease without heart failure 05/27/2022   • Suspected sleep apnea 05/27/2022   • Low HDL (under 40) 02/04/2021   • Eczema 10/15/2019   • Prediabetes 03/14/2019   • Encounter for screening colonoscopy 03/14/2019   • Hematospermia 07/19/2018   • Resistant hypertension 07/19/2018       Portions of the record may have been created with voice recognition software. Occasional wrong word or \"sound a like\" substitutions may have occurred due to the inherent limitations of voice recognition software. Read the chart carefully and recognize, using context, where substitutions have occurred.    Camilo Slater DO, FACC  9/9/2024 3:56 PM          "

## 2025-02-03 ENCOUNTER — OFFICE VISIT (OUTPATIENT)
Dept: FAMILY MEDICINE CLINIC | Facility: CLINIC | Age: 70
End: 2025-02-03

## 2025-02-03 VITALS
HEART RATE: 96 BPM | RESPIRATION RATE: 16 BRPM | SYSTOLIC BLOOD PRESSURE: 190 MMHG | TEMPERATURE: 98 F | WEIGHT: 306 LBS | OXYGEN SATURATION: 95 % | DIASTOLIC BLOOD PRESSURE: 80 MMHG | BODY MASS INDEX: 45.32 KG/M2 | HEIGHT: 69 IN

## 2025-02-03 DIAGNOSIS — I10 ESSENTIAL HYPERTENSION: ICD-10-CM

## 2025-02-03 DIAGNOSIS — R73.03 PREDIABETES: ICD-10-CM

## 2025-02-03 DIAGNOSIS — Z11.4 SCREENING FOR HIV (HUMAN IMMUNODEFICIENCY VIRUS): ICD-10-CM

## 2025-02-03 DIAGNOSIS — Z00.01 ENCOUNTER FOR GENERAL ADULT MEDICAL EXAMINATION WITH ABNORMAL FINDINGS: Primary | ICD-10-CM

## 2025-02-03 DIAGNOSIS — K59.00 CONSTIPATION, UNSPECIFIED CONSTIPATION TYPE: ICD-10-CM

## 2025-02-03 DIAGNOSIS — Z12.11 ENCOUNTER FOR SCREENING COLONOSCOPY: ICD-10-CM

## 2025-02-03 DIAGNOSIS — Z11.59 NEED FOR HEPATITIS C SCREENING TEST: ICD-10-CM

## 2025-02-03 PROCEDURE — 3079F DIAST BP 80-89 MM HG: CPT | Performed by: FAMILY MEDICINE

## 2025-02-03 PROCEDURE — 3077F SYST BP >= 140 MM HG: CPT | Performed by: FAMILY MEDICINE

## 2025-02-03 PROCEDURE — G2211 COMPLEX E/M VISIT ADD ON: HCPCS | Performed by: FAMILY MEDICINE

## 2025-02-03 PROCEDURE — 99213 OFFICE O/P EST LOW 20 MIN: CPT | Performed by: FAMILY MEDICINE

## 2025-02-03 PROCEDURE — 99396 PREV VISIT EST AGE 40-64: CPT | Performed by: FAMILY MEDICINE

## 2025-02-03 RX ORDER — CHLORTHALIDONE 25 MG/1
25 TABLET ORAL DAILY
Qty: 30 TABLET | Refills: 5 | Status: SHIPPED | OUTPATIENT
Start: 2025-02-03 | End: 2025-02-03

## 2025-02-03 RX ORDER — CHLORTHALIDONE 25 MG/1
50 TABLET ORAL DAILY
Qty: 60 TABLET | Refills: 5 | Status: SHIPPED | OUTPATIENT
Start: 2025-02-03

## 2025-02-03 RX ORDER — POLYETHYLENE GLYCOL 3350 17 G/17G
17 POWDER, FOR SOLUTION ORAL DAILY
Qty: 510 G | Refills: 2 | Status: SHIPPED | OUTPATIENT
Start: 2025-02-03

## 2025-02-03 NOTE — ASSESSMENT & PLAN NOTE
Uncontrolled 2/2 non compliance  Denies headaches, chest pain, visual disturbances      PLAN:  Low sodium diet  Cardiovascular exercise with goal of weight loss  Compliance with home meds  Orders:    Albumin / creatinine urine ratio; Future    chlorthalidone 25 mg tablet; Take 2 tablets (50 mg total) by mouth daily

## 2025-02-03 NOTE — ASSESSMENT & PLAN NOTE
Stable    PLAN:    Continue tirzepatide  Orders:    Tirzepatide 10 MG/0.5ML SOAJ; Inject 0.5 mL under the skin once a week

## 2025-02-03 NOTE — PROGRESS NOTES
Adult Annual Physical  Name: Shaw Giron      : 1955      MRN: [id number]  Encounter Provider: Nina Oliveros MD  Encounter Date: 2/3/2025   Encounter department: [facility]    Assessment & Plan  Encounter for general adult medical examination with abnormal findings  Patient is a 68 yo male with PMH resistant hypertension, ALEK, prediabetes  Non compliant with medications for last 9 months  Complained of side effects from lisinopril, amlodipine, hydrochlorothiazide refused to re-start taking, agreed to try chlortalidone most likely will need 2 agent agreed to follow up in next 2 weeks with BP diary and we will start another medication.    PLAN:  Well balanced diet  Compliance with medications  Cardiovascular exercise 150 min weekly  Follow up in 2 weeks re-check BP/diary and review labs  Orders:    Lipid panel; Future    Hemoglobin A1C; Future    Comprehensive metabolic panel; Future    Vitamin D 25 hydroxy; Future    TSH, 3rd generation with Free T4 reflex; Future    Essential hypertension   Uncontrolled 2/2 non compliance  Denies headaches, chest pain, visual disturbances      PLAN:  Low sodium diet  Cardiovascular exercise with goal of weight loss  Compliance with home meds  Orders:    Albumin / creatinine urine ratio; Future    chlorthalidone 25 mg tablet; Take 2 tablets (50 mg total) by mouth daily    Prediabetes  Stable    PLAN:    Continue tirzepatide  Orders:    Tirzepatide 10 MG/0.5ML SOAJ; Inject 0.5 mL under the skin once a week    Encounter for screening colonoscopy    Orders:    Ambulatory Referral to Gastroenterology; Future    Screening for HIV (human immunodeficiency virus)    Orders:    HIV 1/2 AG/AB w Reflex [facility] for 2 yr old and above; Future    Need for hepatitis C screening test    Orders:    Hepatitis C antibody; Future    Constipation, unspecified constipation type    Orders:    polyethylene glycol (MIRALAX) 17 g packet; Take 17 g by  mouth daily    Immunizations and preventive care screenings were discussed with patient today. Appropriate education was printed on patient's after visit summary.    Discussed risks and benefits of prostate cancer screening. We discussed the controversial history of PSA screening for prostate cancer in the [place] as well as the risk of over detection and over treatment of prostate cancer by way of PSA screening.  The patient understands that PSA blood testing is an imperfect way to screen for prostate cancer and that elevated PSA levels in the blood may also be caused by infection, inflammation, prostatic trauma or manipulation, urological procedures, or by benign prostatic enlargement.    The role of the digital rectal examination in prostate cancer screening was also discussed and I discussed with him that there is large interobserver variability in the findings of digital rectal examination.    Counseling:  Alcohol/drug use: discussed moderation in alcohol intake, the recommendations for healthy alcohol use, and avoidance of illicit drug use.  Dental Health: discussed importance of regular tooth brushing, flossing, and dental visits.  Injury prevention: discussed safety/seat belts, safety helmets, smoke detectors, carbon monoxide detectors, and smoking near bedding or upholstery.  Sexual health: discussed sexually transmitted diseases, partner selection, use of condoms, avoidance of unintended pregnancy, and contraceptive alternatives.  Exercise: the importance of regular exercise/physical activity was discussed. Recommend exercise 3-5 times per week for at least 30 minutes.          History of Present Illness     Adult Annual Physical:  Patient presents for annual physical.     Diet and Physical Activity:  - Diet/Nutrition: well balanced diet.  - Exercise: no formal exercise.    Depression Screening:  - PHQ-2 Score: 0    General Health:  - Sleep: sleeps well, 4-6 hours of sleep on average and snores  "loudly.  - Hearing: normal hearing right ear and normal hearing left ear.  - Vision: no vision problems.  - Dental: regular dental visits.     Health:  - History of STDs: no.   - Urinary symptoms: none.     Advanced Care Planning:  - Has an advanced directive?: no    - Has a durable medical POA?: no    - ACP document given to patient?: no      Review of Systems   Constitutional:  Negative for chills and fever.   HENT:  Negative for ear pain and sore throat.    Eyes:  Negative for pain and visual disturbance.   Respiratory:  Negative for cough and shortness of breath.    Cardiovascular:  Negative for chest pain and palpitations.   Gastrointestinal:  Negative for abdominal pain and vomiting.   Genitourinary:  Negative for dysuria and hematuria.   Musculoskeletal:  Negative for arthralgias and back pain.   Skin:  Negative for color change and rash.   Neurological:  Negative for seizures and syncope.   Psychiatric/Behavioral:  Negative for self-injury, sleep disturbance and suicidal ideas.    All other systems reviewed and are negative.        Objective   BP (!) 190/80 (BP Location: Right arm, Patient Position: Sitting, Cuff Size: Large)   Pulse 96   Temp 98 °F (36.7 °C) (Temporal)   Resp 16   Ht 5' 9\" (1.753 m)   Wt (!) 139 kg (306 lb)   SpO2 95%   BMI 45.19 kg/m²     Physical Exam  Vitals and nursing note reviewed.   Constitutional:       General: He is not in acute distress.     Appearance: Normal appearance. He is well-developed.   HENT:      Head: Normocephalic and atraumatic.      Right Ear: Tympanic membrane, ear canal and external ear normal.      Left Ear: Tympanic membrane, ear canal and external ear normal.      Nose: Nose normal.      Mouth/Throat:      Mouth: Mucous membranes are moist.   Eyes:      Extraocular Movements: Extraocular movements intact.      Conjunctiva/sclera: Conjunctivae normal.      Pupils: Pupils are equal, round, and reactive to light.   Cardiovascular:      Rate and Rhythm: " Normal rate and regular rhythm.      Pulses: Normal pulses.      Heart sounds: Normal heart sounds. No murmur heard.  Pulmonary:      Effort: Pulmonary effort is normal. No respiratory distress.      Breath sounds: Normal breath sounds.   Abdominal:      General: Bowel sounds are normal.      Palpations: Abdomen is soft.      Tenderness: There is no abdominal tenderness.   Musculoskeletal:         General: No swelling. Normal range of motion.      Cervical back: Normal range of motion and neck supple.   Skin:     General: Skin is warm and dry.      Capillary Refill: Capillary refill takes less than 2 seconds.   Neurological:      General: No focal deficit present.      Mental Status: He is alert and oriented to person, place, and time. Mental status is at baseline.   Psychiatric:         Mood and Affect: Mood normal.         Behavior: Behavior normal.

## 2025-02-04 ENCOUNTER — APPOINTMENT (OUTPATIENT)
Dept: LAB | Facility: CLINIC | Age: 70
End: 2025-02-04
Payer: COMMERCIAL

## 2025-02-04 DIAGNOSIS — Z00.01 ENCOUNTER FOR GENERAL ADULT MEDICAL EXAMINATION WITH ABNORMAL FINDINGS: ICD-10-CM

## 2025-02-04 DIAGNOSIS — Z11.59 NEED FOR HEPATITIS C SCREENING TEST: ICD-10-CM

## 2025-02-04 DIAGNOSIS — I10 ESSENTIAL HYPERTENSION: ICD-10-CM

## 2025-02-04 DIAGNOSIS — Z11.4 SCREENING FOR HIV (HUMAN IMMUNODEFICIENCY VIRUS): ICD-10-CM

## 2025-02-04 LAB
25(OH)D3 SERPL-MCNC: 14 NG/ML (ref 30–100)
ALBUMIN SERPL BCG-MCNC: 4.1 G/DL (ref 3.5–5)
ALP SERPL-CCNC: 61 U/L (ref 34–104)
ALT SERPL W P-5'-P-CCNC: 18 U/L (ref 7–52)
ANION GAP SERPL CALCULATED.3IONS-SCNC: 7 MMOL/L (ref 4–13)
AST SERPL W P-5'-P-CCNC: 20 U/L (ref 13–39)
BILIRUB SERPL-MCNC: 0.48 MG/DL (ref 0.2–1)
BUN SERPL-MCNC: 21 MG/DL (ref 5–25)
CALCIUM SERPL-MCNC: 9.6 MG/DL (ref 8.4–10.2)
CHLORIDE SERPL-SCNC: 104 MMOL/L (ref 96–108)
CHOLEST SERPL-MCNC: 146 MG/DL (ref ?–200)
CO2 SERPL-SCNC: 30 MMOL/L (ref 21–32)
CREAT SERPL-MCNC: 0.95 MG/DL (ref 0.6–1.3)
CREAT UR-MCNC: 181.1 MG/DL
EST. AVERAGE GLUCOSE BLD GHB EST-MCNC: 140 MG/DL
GFR SERPL CREATININE-BSD FRML MDRD: 81 ML/MIN/1.73SQ M
GLUCOSE P FAST SERPL-MCNC: 108 MG/DL (ref 65–99)
HBA1C MFR BLD: 6.5 %
HDLC SERPL-MCNC: 36 MG/DL
LDLC SERPL CALC-MCNC: 88 MG/DL (ref 0–100)
MICROALBUMIN UR-MCNC: 45.4 MG/L
MICROALBUMIN/CREAT 24H UR: 25 MG/G CREATININE (ref 0–30)
NONHDLC SERPL-MCNC: 110 MG/DL
POTASSIUM SERPL-SCNC: 4 MMOL/L (ref 3.5–5.3)
PROT SERPL-MCNC: 7.2 G/DL (ref 6.4–8.4)
SODIUM SERPL-SCNC: 141 MMOL/L (ref 135–147)
TRIGL SERPL-MCNC: 112 MG/DL (ref ?–150)
TSH SERPL DL<=0.05 MIU/L-ACNC: 1.73 UIU/ML (ref 0.45–4.5)

## 2025-02-04 PROCEDURE — 36415 COLL VENOUS BLD VENIPUNCTURE: CPT

## 2025-02-04 PROCEDURE — 87389 HIV-1 AG W/HIV-1&-2 AB AG IA: CPT

## 2025-02-04 PROCEDURE — 86803 HEPATITIS C AB TEST: CPT

## 2025-02-04 PROCEDURE — 82306 VITAMIN D 25 HYDROXY: CPT

## 2025-02-04 PROCEDURE — 83036 HEMOGLOBIN GLYCOSYLATED A1C: CPT

## 2025-02-04 PROCEDURE — 82043 UR ALBUMIN QUANTITATIVE: CPT

## 2025-02-04 PROCEDURE — 80061 LIPID PANEL: CPT

## 2025-02-04 PROCEDURE — 82570 ASSAY OF URINE CREATININE: CPT

## 2025-02-04 PROCEDURE — 84443 ASSAY THYROID STIM HORMONE: CPT

## 2025-02-04 PROCEDURE — 80053 COMPREHEN METABOLIC PANEL: CPT

## 2025-02-05 ENCOUNTER — RA CDI HCC (OUTPATIENT)
Dept: OTHER | Facility: HOSPITAL | Age: 70
End: 2025-02-05

## 2025-02-05 LAB
HCV AB SER QL: NORMAL
HIV 1+2 AB+HIV1 P24 AG SERPL QL IA: NORMAL
HIV 2 AB SERPL QL IA: NORMAL
HIV1 AB SERPL QL IA: NORMAL
HIV1 P24 AG SERPL QL IA: NORMAL

## 2025-02-05 NOTE — PROGRESS NOTES
HCC coding opportunities          Chart Reviewed number of suggestions sent to Provider: 1  E66.01     Patients Insurance     Medicare Insurance: [facility] Medicare Advantage

## 2025-02-06 ENCOUNTER — PREP FOR PROCEDURE (OUTPATIENT)
Age: 70
End: 2025-02-06

## 2025-02-06 ENCOUNTER — TELEPHONE (OUTPATIENT)
Age: 70
End: 2025-02-06

## 2025-02-06 DIAGNOSIS — Z12.11 SCREENING FOR COLON CANCER: Primary | ICD-10-CM

## 2025-02-06 PROBLEM — I10 ESSENTIAL HYPERTENSION: Status: RESOLVED | Noted: 2024-05-21 | Resolved: 2025-02-06

## 2025-02-06 PROBLEM — R36.1 HEMATOSPERMIA: Status: RESOLVED | Noted: 2018-07-19 | Resolved: 2025-02-06

## 2025-02-06 PROBLEM — L30.9 ECZEMA: Status: RESOLVED | Noted: 2019-10-15 | Resolved: 2025-02-06

## 2025-02-06 PROBLEM — R29.818 SUSPECTED SLEEP APNEA: Status: RESOLVED | Noted: 2022-05-27 | Resolved: 2025-02-06

## 2025-02-06 PROBLEM — E78.6 LOW HDL (UNDER 40): Status: RESOLVED | Noted: 2021-02-04 | Resolved: 2025-02-06

## 2025-02-06 NOTE — TELEPHONE ENCOUNTER
02/06/25  Screened by: Arabella Trotter    Referring Provider Dr. Nina Oliveros    Pre- Screening:     There is no height or weight on file to calculate BMI.   45.19  Has patient been referred for a routine screening Colonoscopy? yes  Is the patient between 45-75 years old? yes      Previous Colonoscopy no   If yes:    Date:     Facility:     Reason:         Does the patient want to see a Gastroenterologist prior to their procedure OR are they having any GI symptoms? no    Has the patient been hospitalized or had abdominal surgery in the past 6 months? no    Does the patient use supplemental oxygen? no    Does the patient take Coumadin, Lovenox, Plavix, Elliquis, Xarelto, or other blood thinning medication? no    Has the patient had a stroke, cardiac event, or stent placed in the past year? no        If patient is between 45yrs - 49yrs, please advise patient that we will have to confirm benefits & coverage with their insurance company for a routine screening colonoscopy.

## 2025-02-10 NOTE — TELEPHONE ENCOUNTER
Scheduled date of colonoscopy (as of today):   2/21/25    Physician performing colonoscopy:  Dr. Conner    Location of colonoscopy:      Bowel prep reviewed with patient:      chema*   JOSEY/DUL in Malaysian

## 2025-02-12 ENCOUNTER — OFFICE VISIT (OUTPATIENT)
Dept: FAMILY MEDICINE CLINIC | Facility: CLINIC | Age: 70
End: 2025-02-12

## 2025-02-12 VITALS
HEART RATE: 68 BPM | BODY MASS INDEX: 45.53 KG/M2 | HEIGHT: 69 IN | TEMPERATURE: 98.1 F | WEIGHT: 307.4 LBS | DIASTOLIC BLOOD PRESSURE: 70 MMHG | RESPIRATION RATE: 18 BRPM | SYSTOLIC BLOOD PRESSURE: 196 MMHG | OXYGEN SATURATION: 99 %

## 2025-02-12 DIAGNOSIS — E55.9 VITAMIN D DEFICIENCY: ICD-10-CM

## 2025-02-12 DIAGNOSIS — I1A.0 RESISTANT HYPERTENSION: Primary | ICD-10-CM

## 2025-02-12 PROCEDURE — 99213 OFFICE O/P EST LOW 20 MIN: CPT | Performed by: FAMILY MEDICINE

## 2025-02-12 PROCEDURE — 3078F DIAST BP <80 MM HG: CPT | Performed by: FAMILY MEDICINE

## 2025-02-12 PROCEDURE — 3077F SYST BP >= 140 MM HG: CPT | Performed by: FAMILY MEDICINE

## 2025-02-12 PROCEDURE — G2211 COMPLEX E/M VISIT ADD ON: HCPCS | Performed by: FAMILY MEDICINE

## 2025-02-12 RX ORDER — LOSARTAN POTASSIUM 50 MG/1
50 TABLET ORAL DAILY
Qty: 30 TABLET | Refills: 2 | Status: SHIPPED | OUTPATIENT
Start: 2025-02-12

## 2025-02-12 RX ORDER — ERGOCALCIFEROL 1.25 MG/1
50000 CAPSULE, LIQUID FILLED ORAL WEEKLY
Qty: 4 CAPSULE | Refills: 2 | Status: SHIPPED | OUTPATIENT
Start: 2025-02-12

## 2025-02-12 NOTE — ASSESSMENT & PLAN NOTE
Uncontrolled 2/2 patient decided not compliance due to fear of side effects from medications  Started clorthalidone 50mg daily 2 weeks ago  BP log >160/90 , BP today 190/70  Denies headaches, chest pain, visual disturbances  Asymptomatic    PLAN:  Low sodium diet  Hypertension, education provided  Cardiovascular exercise 150 min weekly  Continue chlortalidone 25mg daily, start losartan 50mg daily, start amlodipine 5mg (patient has at home)     Orders:    losartan (COZAAR) 50 mg tablet; Take 1 tablet (50 mg total) by mouth daily

## 2025-02-14 DIAGNOSIS — I10 ESSENTIAL HYPERTENSION: ICD-10-CM

## 2025-02-14 RX ORDER — AMLODIPINE BESYLATE 5 MG/1
5 TABLET ORAL 2 TIMES DAILY
Qty: 180 TABLET | Refills: 0 | Status: SHIPPED | OUTPATIENT
Start: 2025-02-14

## 2025-02-20 RX ORDER — SODIUM CHLORIDE, SODIUM LACTATE, POTASSIUM CHLORIDE, CALCIUM CHLORIDE 600; 310; 30; 20 MG/100ML; MG/100ML; MG/100ML; MG/100ML
125 INJECTION, SOLUTION INTRAVENOUS CONTINUOUS
OUTPATIENT
Start: 2025-02-20

## 2025-03-28 DIAGNOSIS — K59.00 CONSTIPATION, UNSPECIFIED CONSTIPATION TYPE: ICD-10-CM

## 2025-03-28 RX ORDER — POLYETHYLENE GLYCOL 3350 17 G/17G
POWDER, FOR SOLUTION ORAL
Qty: 510 G | Refills: 1 | Status: SHIPPED | OUTPATIENT
Start: 2025-03-28

## 2025-05-08 DIAGNOSIS — I10 ESSENTIAL HYPERTENSION: ICD-10-CM

## 2025-05-08 RX ORDER — AMLODIPINE BESYLATE 5 MG/1
5 TABLET ORAL 2 TIMES DAILY
Qty: 180 TABLET | Refills: 0 | Status: SHIPPED | OUTPATIENT
Start: 2025-05-08

## 2025-05-09 NOTE — TELEPHONE ENCOUNTER
Called and spoke with Shaw and scheduled a follow up in [place] Thursday, 6/12 10 am with Keeley CRAVEN Will mail an appointment card.

## 2025-05-12 DIAGNOSIS — E55.9 VITAMIN D DEFICIENCY: ICD-10-CM

## 2025-05-13 RX ORDER — ERGOCALCIFEROL 1.25 MG/1
50000 CAPSULE, LIQUID FILLED ORAL WEEKLY
Qty: 4 CAPSULE | Refills: 1 | Status: SHIPPED | OUTPATIENT
Start: 2025-05-13

## 2025-05-24 DIAGNOSIS — K59.00 CONSTIPATION, UNSPECIFIED CONSTIPATION TYPE: ICD-10-CM

## 2025-05-27 RX ORDER — POLYETHYLENE GLYCOL 3350 17 G/17G
POWDER, FOR SOLUTION ORAL
Qty: 510 G | Refills: 0 | Status: SHIPPED | OUTPATIENT
Start: 2025-05-27

## 2025-06-04 ENCOUNTER — HOSPITAL ENCOUNTER (EMERGENCY)
Facility: HOSPITAL | Age: 70
Discharge: HOME | End: 2025-06-05
Attending: EMERGENCY MEDICINE
Payer: COMMERCIAL

## 2025-06-04 DIAGNOSIS — K57.90 DIVERTICULOSIS: ICD-10-CM

## 2025-06-04 DIAGNOSIS — R10.13 EPIGASTRIC ABDOMINAL PAIN: Primary | ICD-10-CM

## 2025-06-04 DIAGNOSIS — K80.20 CHOLELITHIASIS: ICD-10-CM

## 2025-06-04 PROCEDURE — 93005 ELECTROCARDIOGRAM TRACING: CPT

## 2025-06-04 PROCEDURE — 99284 EMERGENCY DEPT VISIT MOD MDM: CPT

## 2025-06-05 ENCOUNTER — APPOINTMENT (EMERGENCY)
Dept: CT IMAGING | Facility: HOSPITAL | Age: 70
End: 2025-06-05
Payer: COMMERCIAL

## 2025-06-05 VITALS
SYSTOLIC BLOOD PRESSURE: 171 MMHG | RESPIRATION RATE: 18 BRPM | WEIGHT: 309 LBS | TEMPERATURE: 98.4 F | BODY MASS INDEX: 45.63 KG/M2 | HEART RATE: 63 BPM | OXYGEN SATURATION: 98 % | DIASTOLIC BLOOD PRESSURE: 90 MMHG

## 2025-06-05 LAB
ALBUMIN SERPL BCG-MCNC: 4.2 G/DL (ref 3.5–5)
ALP SERPL-CCNC: 53 U/L (ref 34–104)
ALT SERPL W P-5'-P-CCNC: 15 U/L (ref 7–52)
ANION GAP SERPL CALCULATED.3IONS-SCNC: 9 MMOL/L (ref 4–13)
AST SERPL W P-5'-P-CCNC: 17 U/L (ref 13–39)
ATRIAL RATE: 70 BPM
BACTERIA UR QL AUTO: ABNORMAL /HPF
BASOPHILS # BLD AUTO: 0.06 THOUSANDS/ÂΜL (ref 0–0.1)
BASOPHILS NFR BLD AUTO: 0 % (ref 0–1)
BILIRUB SERPL-MCNC: 0.36 MG/DL (ref 0.2–1)
BILIRUB UR QL STRIP: NEGATIVE
BUN SERPL-MCNC: 24 MG/DL (ref 5–25)
CALCIUM SERPL-MCNC: 9.3 MG/DL (ref 8.4–10.2)
CHLORIDE SERPL-SCNC: 103 MMOL/L (ref 96–108)
CLARITY UR: CLEAR
CO2 SERPL-SCNC: 27 MMOL/L (ref 21–32)
COLOR UR: YELLOW
CREAT SERPL-MCNC: 0.97 MG/DL (ref 0.6–1.3)
EOSINOPHIL # BLD AUTO: 0.23 THOUSAND/ÂΜL (ref 0–0.61)
EOSINOPHIL NFR BLD AUTO: 2 % (ref 0–6)
ERYTHROCYTE [DISTWIDTH] IN BLOOD BY AUTOMATED COUNT: 15.2 % (ref 11.6–15.1)
GFR SERPL CREATININE-BSD FRML MDRD: 79 ML/MIN/1.73SQ M
GLUCOSE SERPL-MCNC: 178 MG/DL (ref 65–140)
GLUCOSE UR STRIP-MCNC: NEGATIVE MG/DL
HCT VFR BLD AUTO: 44.9 % (ref 36.5–49.3)
HGB BLD-MCNC: 13.8 G/DL (ref 12–17)
HGB UR QL STRIP.AUTO: 50
IMM GRANULOCYTES # BLD AUTO: 0.08 THOUSAND/UL (ref 0–0.2)
IMM GRANULOCYTES NFR BLD AUTO: 1 % (ref 0–2)
KETONES UR STRIP-MCNC: ABNORMAL MG/DL
LEUKOCYTE ESTERASE UR QL STRIP: NEGATIVE
LIPASE SERPL-CCNC: 32 U/L (ref 11–82)
LYMPHOCYTES # BLD AUTO: 2.16 THOUSANDS/ÂΜL (ref 0.6–4.47)
LYMPHOCYTES NFR BLD AUTO: 16 % (ref 14–44)
MCH RBC QN AUTO: 25.6 PG (ref 26.8–34.3)
MCHC RBC AUTO-ENTMCNC: 30.7 G/DL (ref 31.4–37.4)
MCV RBC AUTO: 83 FL (ref 82–98)
MONOCYTES # BLD AUTO: 0.61 THOUSAND/ÂΜL (ref 0.17–1.22)
MONOCYTES NFR BLD AUTO: 5 % (ref 4–12)
NEUTROPHILS # BLD AUTO: 10.37 THOUSANDS/ÂΜL (ref 1.85–7.62)
NEUTS SEG NFR BLD AUTO: 76 % (ref 43–75)
NITRITE UR QL STRIP: NEGATIVE
NON-SQ EPI CELLS URNS QL MICRO: ABNORMAL /HPF
NRBC BLD AUTO-RTO: 0 /100 WBCS
OTHER STN SPEC: ABNORMAL
P AXIS: 54 DEGREES
PH UR STRIP.AUTO: 6 [PH]
PLATELET # BLD AUTO: 269 THOUSANDS/UL (ref 149–390)
PMV BLD AUTO: 10 FL (ref 8.9–12.7)
POTASSIUM SERPL-SCNC: 4 MMOL/L (ref 3.5–5.3)
PR INTERVAL: 142 MS
PROT SERPL-MCNC: 7.5 G/DL (ref 6.4–8.4)
PROT UR STRIP-MCNC: ABNORMAL MG/DL
QRS AXIS: 1 DEGREES
QRSD INTERVAL: 108 MS
QT INTERVAL: 424 MS
QTC INTERVAL: 458 MS
RBC # BLD AUTO: 5.4 MILLION/UL (ref 3.88–5.62)
RBC #/AREA URNS AUTO: ABNORMAL /HPF
SODIUM SERPL-SCNC: 139 MMOL/L (ref 135–147)
SP GR UR STRIP.AUTO: 1.02 (ref 1–1.04)
T WAVE AXIS: 78 DEGREES
UROBILINOGEN UA: NEGATIVE MG/DL
VENTRICULAR RATE: 70 BPM
WBC # BLD AUTO: 13.51 THOUSAND/UL (ref 4.31–10.16)
WBC #/AREA URNS AUTO: ABNORMAL /HPF

## 2025-06-05 PROCEDURE — 99285 EMERGENCY DEPT VISIT HI MDM: CPT

## 2025-06-05 PROCEDURE — 80053 COMPREHEN METABOLIC PANEL: CPT

## 2025-06-05 PROCEDURE — 96361 HYDRATE IV INFUSION ADD-ON: CPT

## 2025-06-05 PROCEDURE — 93010 ELECTROCARDIOGRAM REPORT: CPT | Performed by: INTERNAL MEDICINE

## 2025-06-05 PROCEDURE — 81003 URINALYSIS AUTO W/O SCOPE: CPT

## 2025-06-05 PROCEDURE — 36415 COLL VENOUS BLD VENIPUNCTURE: CPT

## 2025-06-05 PROCEDURE — 96375 TX/PRO/DX INJ NEW DRUG ADDON: CPT

## 2025-06-05 PROCEDURE — 81001 URINALYSIS AUTO W/SCOPE: CPT

## 2025-06-05 PROCEDURE — 83690 ASSAY OF LIPASE: CPT

## 2025-06-05 PROCEDURE — 74177 CT ABD & PELVIS W/CONTRAST: CPT

## 2025-06-05 PROCEDURE — 96374 THER/PROPH/DIAG INJ IV PUSH: CPT

## 2025-06-05 PROCEDURE — 85025 COMPLETE CBC W/AUTO DIFF WBC: CPT

## 2025-06-05 RX ORDER — LISINOPRIL 40 MG/1
40 TABLET ORAL DAILY
COMMUNITY
Start: 2025-05-12

## 2025-06-05 RX ORDER — ONDANSETRON 2 MG/ML
4 INJECTION INTRAMUSCULAR; INTRAVENOUS ONCE
Status: COMPLETED | OUTPATIENT
Start: 2025-06-05 | End: 2025-06-05

## 2025-06-05 RX ORDER — MAGNESIUM HYDROXIDE/ALUMINUM HYDROXICE/SIMETHICONE 120; 1200; 1200 MG/30ML; MG/30ML; MG/30ML
30 SUSPENSION ORAL ONCE
Status: COMPLETED | OUTPATIENT
Start: 2025-06-05 | End: 2025-06-05

## 2025-06-05 RX ORDER — FAMOTIDINE 10 MG/ML
20 INJECTION, SOLUTION INTRAVENOUS ONCE
Status: COMPLETED | OUTPATIENT
Start: 2025-06-05 | End: 2025-06-05

## 2025-06-05 RX ADMIN — ALUMINUM HYDROXIDE, MAGNESIUM HYDROXIDE, AND DIMETHICONE 30 ML: 200; 20; 200 SUSPENSION ORAL at 01:08

## 2025-06-05 RX ADMIN — SODIUM CHLORIDE 1000 ML: 0.9 INJECTION, SOLUTION INTRAVENOUS at 01:09

## 2025-06-05 RX ADMIN — ONDANSETRON 4 MG: 2 INJECTION INTRAMUSCULAR; INTRAVENOUS at 01:08

## 2025-06-05 RX ADMIN — IOHEXOL 100 ML: 350 INJECTION, SOLUTION INTRAVENOUS at 01:28

## 2025-06-05 RX ADMIN — FAMOTIDINE 20 MG: 10 INJECTION, SOLUTION INTRAVENOUS at 01:08

## 2025-06-05 NOTE — DISCHARGE INSTRUCTIONS
Your CT scan shows stones in your gallbladder without infection. If you continue to have epigastric pain, please follow up with general surgery.     You may take Tylenol and ibuprofen as needed for pain.    Return to the ED if symptoms worsen.     La tomografía computarizada muestra cálculos biliares sin infección. Si continúa con dolor epigástrico, consulte con un cirujano general. Puede shiela Tylenol e ibuprofeno según sea necesario para el dolor. Regrese a urgencias si los síntomas empeoran.

## 2025-06-05 NOTE — ED NOTES
Patient reports that he is feeling better - up to the bathroom.     Ashley Valera RN  06/05/25 0230

## 2025-06-05 NOTE — ED NOTES
Patient and spouse seen by the provider with results of testing and discharge and follow up instructions given.  Patient offering no complaints upon discharge.     Ashley Valera RN  06/05/25 [id number]

## 2025-06-05 NOTE — ED PROVIDER NOTES
Time reflects when diagnosis was documented in both MDM as applicable and the Disposition within this note       Time User Action Codes Description Comment    6/5/2025  2:55 AM Trinidad Renteria Add [R10.13] Epigastric abdominal pain     6/5/2025  2:56 AM Trinidad Renteria [K80.20] Cholelithiasis     6/5/2025  2:56 AM Trinidad Renteria [K57.90] Diverticulosis           ED Disposition       ED Disposition   Discharge    Condition   Stable    Date/Time   Thu Jun 5, 2025  2:55 AM    Comment   Shaw Lee discharge to home/self care.                   Assessment & Plan       Medical Decision Making  70-year-old male presenting for evaluation of epigastric pain which began earlier tonight after eating.  1 episode of vomiting.  On exam patient well-appearing, no apparent distress.  Moderately hypertensive, improved on recheck.  Heart regular rate and rhythm, lungs clear to auscultation bilaterally.  Abdomen with mild epigastric tenderness on exam, no rebound or guarding.  Differential diagnosis to include but not limited to: Gastritis, GERD, cholelithiasis, cholecystitis, less likely appendicitis, diverticulitis, cardiac etiology.  EKG normal sinus rhythm with no acute ST or T wave changes.  Plan for labs, CT abdomen pelvis, symptomatic management and reassess.  Lab work with mild leukocytosis at 13.5, trace amount of blood in urine but otherwise negative.  CT abdomen pelvis showing cholelithiasis without cholecystitis and diverticulosis without diverticulitis.  Patient reporting improvement in symptoms with medication.  Due to cholelithiasis, will place general surgery referral for further evaluation.  Discussed this plan with patient who is in agreement.  Return precautions provided.  Patient discharged home to self-care.    Amount and/or Complexity of Data Reviewed  Labs: ordered.  Radiology: ordered.    Risk  OTC drugs.  Prescription drug management.             Medications   ondansetron (ZOFRAN) injection 4 mg (4  mg Intravenous Given 6/5/25 0108)   aluminum-magnesium hydroxide-simethicone (MAALOX) oral suspension 30 mL (30 mL Oral Given 6/5/25 0108)   Famotidine (PF) (PEPCID) injection 20 mg (20 mg Intravenous Given 6/5/25 0108)   sodium chloride 0.9 % bolus 1,000 mL (0 mL Intravenous Stopped 6/5/25 0236)   iohexol (OMNIPAQUE) 350 MG/ML injection (SINGLE-DOSE) 100 mL (100 mL Intravenous Given 6/5/25 0128)       ED Risk Strat Scores                    No data recorded        SBIRT 22yo+      Flowsheet Row Most Recent Value   Initial Alcohol Screen: US AUDIT-C     1. How often do you have a drink containing alcohol? 0 Filed at: 06/04/2025 2342   2. How many drinks containing alcohol do you have on a typical day you are drinking?  0 Filed at: 06/04/2025 2342   3a. Male UNDER 65: How often do you have five or more drinks on one occasion? 0 Filed at: 06/04/2025 2342   3b. FEMALE Any Age, or MALE 65+: How often do you have 4 or more drinks on one occassion? 0 Filed at: 06/04/2025 2342   Audit-C Score 0 Filed at: 06/04/2025 2342   KRUPA: How many times in the past year have you...    Used an illegal drug or used a prescription medication for non-medical reasons? Never Filed at: 06/04/2025 2342                            History of Present Illness       Chief Complaint   Patient presents with    Abdominal Pain     Reports abdominal pain after eating tonight @630 with episode of vomiting and also states that his BP was high 201/91.       Past Medical History[1]   Past Surgical History[2]   Family History[3]   Social History[4]   E-Cigarette/Vaping    E-Cigarette Use Never User       E-Cigarette/Vaping Substances    Nicotine No     THC No     CBD No     Flavoring No     Other No     Unknown No       I have reviewed and agree with the history as documented.     Patient is a 70-year-old male presenting for evaluation of epigastric abdominal pain which began tonight after eating.  One episode of vomiting.  Patient also states that his BP  was elevated at home with measurement of 201/91.  Has had previous episodes of pain like this before.  Did take N[place] at home without relief.  Does not take all of his prescribed blood pressure medication.  Denies any fevers, chills, chest pain, shortness of breath, urinary symptoms.      Abdominal Pain  Associated symptoms: vomiting    Associated symptoms: no chest pain, no chills, no cough, no dysuria, no fever, no hematuria, no shortness of breath and no sore throat        Review of Systems   Constitutional:  Negative for chills and fever.   HENT:  Negative for ear pain and sore throat.    Eyes:  Negative for pain and visual disturbance.   Respiratory:  Negative for cough and shortness of breath.    Cardiovascular:  Negative for chest pain and palpitations.   Gastrointestinal:  Positive for abdominal pain and vomiting.   Genitourinary:  Negative for dysuria and hematuria.   Musculoskeletal:  Negative for arthralgias and back pain.   Skin:  Negative for color change and rash.   Neurological:  Negative for seizures and syncope.   All other systems reviewed and are negative.          Objective       ED Triage Vitals [06/04/25 2340]   Temperature Pulse Blood Pressure Respirations SpO2 Patient Position - Orthostatic VS   98.4 °F (36.9 °C) 67 (!) 200/85 20 98 % Sitting      Temp Source Heart Rate Source BP Location FiO2 (%) Pain Score    Oral Monitor Left arm -- --      Vitals      Date and Time Temp Pulse SpO2 Resp BP Pain Score FACES Pain Rating User   06/05/25 0314 -- 63 98 % 18 171/90 -- -- EY   06/05/25 0200 -- 67 96 % 18 178/85 -- -- EY   06/05/25 0112 -- 65 96 % 18 184/88 -- -- EY 06/05/25 0030 -- 65 97 % 18 182/88 -- -- EY   06/05/25 0019 -- 66 96 % 18 181/80 -- -- EY   06/04/25 2340 98.4 °F (36.9 °C) 67 98 % 20 200/85 -- -- EY            Physical Exam  Vitals and nursing note reviewed.   Constitutional:       General: He is not in acute distress.     Appearance: He is well-developed.   HENT:       Head: Normocephalic and atraumatic.     Eyes:      Conjunctiva/sclera: Conjunctivae normal.       Cardiovascular:      Rate and Rhythm: Normal rate and regular rhythm.      Heart sounds: No murmur heard.  Pulmonary:      Effort: Pulmonary effort is normal. No respiratory distress.      Breath sounds: Normal breath sounds.   Abdominal:      Palpations: Abdomen is soft.      Tenderness: There is abdominal tenderness in the epigastric area.     Musculoskeletal:         General: No swelling.      Cervical back: Neck supple.     Skin:     General: Skin is warm and dry.      Capillary Refill: Capillary refill takes less than 2 seconds.     Neurological:      Mental Status: He is alert.     Psychiatric:         Mood and Affect: Mood normal.         Results Reviewed       Procedure Component Value Units Date/Time    Comprehensive metabolic panel [[id number]]  (Abnormal) Collected: 06/05/25 0020    Lab Status: Final result Specimen: Blood from Arm, Right Updated: 06/05/25 0044     Sodium 139 mmol/L      Potassium 4.0 mmol/L      Chloride 103 mmol/L      CO2 27 mmol/L      ANION GAP 9 mmol/L      BUN 24 mg/dL      Creatinine 0.97 mg/dL      Glucose 178 mg/dL      Calcium 9.3 mg/dL      AST 17 U/L      ALT 15 U/L      Alkaline Phosphatase 53 U/L      Total Protein 7.5 g/dL      Albumin 4.2 g/dL      Total Bilirubin 0.36 mg/dL      eGFR 79 ml/min/1.73sq m     Narrative:      National Kidney Disease Foundation guidelines for Chronic Kidney Disease (CKD):     Stage 1 with normal or high GFR (GFR > 90 mL/min/1.73 square meters)    Stage 2 Mild CKD (GFR = 60-89 mL/min/1.73 square meters)    Stage 3A Moderate CKD (GFR = 45-59 mL/min/1.73 square meters)    Stage 3B Moderate CKD (GFR = 30-44 mL/min/1.73 square meters)    Stage 4 Severe CKD (GFR = 15-29 mL/min/1.73 square meters)    Stage 5 End Stage CKD (GFR <15 mL/min/1.73 square meters)  Note: GFR calculation is accurate only with a steady state creatinine    Lipase [[id number]]   (Normal) Collected: 06/05/25 0020    Lab Status: Final result Specimen: Blood from Arm, Right Updated: 06/05/25 0044     Lipase 32 u/L     Urine Microscopic [[id number]]  (Abnormal) Collected: 06/05/25 0021    Lab Status: Final result Specimen: Urine, Other Updated: 06/05/25 0035     RBC, UA 4-10 /hpf      WBC, UA None Seen /hpf      Epithelial Cells Occasional /hpf      Bacteria, UA None Seen /hpf      OTHER OBSERVATIONS Yeast Cells Present    UA w Reflex to Microscopic w Reflex to Culture [[id number]]  (Abnormal) Collected: 06/05/25 0021    Lab Status: Final result Specimen: Urine, Other Updated: 06/05/25 0031     Color, UA Yellow     Clarity, UA Clear     Specific Gravity, UA 1.025     pH, UA 6.0     Leukocytes, UA Negative     Nitrite, UA Negative     Protein, UA 30 (1+) mg/dl      Glucose, UA Negative mg/dl      Ketones, UA 15 (1+) mg/dl      Bilirubin, UA Negative     Occult Blood, UA 50.0     UROBILINOGEN UA Negative mg/dL     CBC and differential [[id number]]  (Abnormal) Collected: 06/05/25 0020    Lab Status: Final result Specimen: Blood from Arm, Right Updated: 06/05/25 0029     WBC 13.51 Thousand/uL      RBC 5.40 Million/uL      Hemoglobin 13.8 g/dL      Hematocrit 44.9 %      MCV 83 fL      MCH 25.6 pg      MCHC 30.7 g/dL      RDW 15.2 %      MPV 10.0 fL      Platelets 269 Thousands/uL      nRBC 0 /100 WBCs      Segmented % 76 %      Immature Grans % 1 %      Lymphocytes % 16 %      Monocytes % 5 %      Eosinophils Relative 2 %      Basophils Relative 0 %      Absolute Neutrophils 10.37 Thousands/µL      Absolute Immature Grans 0.08 Thousand/uL      Absolute Lymphocytes 2.16 Thousands/µL      Absolute Monocytes 0.61 Thousand/µL      Eosinophils Absolute 0.23 Thousand/µL      Basophils Absolute 0.06 Thousands/µL             CT abdomen pelvis with contrast   Final Interpretation by Jose Ramey MD (06/05 0250)      1.  Cholelithiasis without evidence of cholecystitis.   2.  Diverticulosis without evidence of  diverticulitis.         Workstation performed: [id number]             Procedures    ED Medication and Procedure Management   Prior to Admission Medications   Prescriptions Last Dose Informant Patient Reported? Taking?   amLODIPine (NORVASC) 5 mg tablet 6/4/2025  No Yes   Sig: TAKE 1 TABLET BY MOUTH TWICE A DAY   chlorthalidone 25 mg tablet 6/4/2025  No Yes   Sig: Take 2 tablets (50 mg total) by mouth daily   ergocalciferol (VITAMIN D2) 50,000 units Unknown  No No   Sig: TAKE 1 CAPSULE BY MOUTH EVERY WEEK   lisinopril (ZESTRIL) 40 mg tablet   Yes Yes   Sig: Take 40 mg by mouth daily   losartan (COZAAR) 50 mg tablet Not Taking  No No   Sig: Take 1 tablet (50 mg total) by mouth daily   Patient not taking: Reported on 6/5/2025   polyethylene glycol (GLYCOLAX) 17 GM/SCOOP powder   No No   Sig: DISSOLVE 17 GRAMS(ONE CAPFUL) IN 8 OUNCE OF WATER/JUICE & TAKE BY MOUTH DAILY AS DIRECTED *MAXIMUM ONE DOSE DAILY*   pravastatin (PRAVACHOL) 10 mg tablet Not Taking  Yes No   Patient not taking: Reported on 6/5/2025      Facility-Administered Medications: None     Discharge Medication List as of 6/5/2025  3:21 AM        CONTINUE these medications which have NOT CHANGED    Details   amLODIPine (NORVASC) 5 mg tablet TAKE 1 TABLET BY MOUTH TWICE A DAY, Starting Thu 5/8/2025, Normal      chlorthalidone 25 mg tablet Take 2 tablets (50 mg total) by mouth daily, Starting Mon 2/3/2025, Normal      lisinopril (ZESTRIL) 40 mg tablet Take 40 mg by mouth daily, Starting Mon 5/12/2025, Historical Med      ergocalciferol (VITAMIN D2) 50,000 units TAKE 1 CAPSULE BY MOUTH EVERY WEEK, Starting Tue 5/13/2025, Normal      losartan (COZAAR) 50 mg tablet Take 1 tablet (50 mg total) by mouth daily, Starting Wed 2/12/2025, Normal      polyethylene glycol (GLYCOLAX) 17 GM/SCOOP powder DISSOLVE 17 GRAMS(ONE CAPFUL) IN 8 OUNCE OF WATER/JUICE & TAKE BY MOUTH DAILY AS DIRECTED *MAXIMUM ONE DOSE DAILY*, Normal      pravastatin (PRAVACHOL) 10 mg tablet  Historical Med             ED SEPSIS DOCUMENTATION   Time reflects when diagnosis was documented in both MDM as applicable and the Disposition within this note       Time User Action Codes Description Comment    6/5/2025  2:55 AM Trinidad Renteria [R10.13] Epigastric abdominal pain     6/5/2025  2:56 AM Trinidad Renteria [K80.20] Cholelithiasis     6/5/2025  2:56 AM Trinidad Renteria [K57.90] Diverticulosis                    [1]   Past Medical History:  Diagnosis Date    Hypertension     Prediabetes    [2] No past surgical history on file.  [3]   Family History  Problem Relation Name Age of Onset    Hypertension Mother      Coronary artery disease Mother      Hypertension Father      Coronary artery disease Father      Hypertension Sister      Coronary artery disease Sister      Hypertension Brother      Coronary artery disease Brother      Hypertension Paternal Uncle      Stroke Paternal Uncle     [4]   Social History  Tobacco Use    Smoking status: Never    Smokeless tobacco: Never    Tobacco comments:     only tried one cigarette when teenager   Vaping Use    Vaping status: Never Used   Substance Use Topics    Alcohol use: Never    Drug use: Never        Trinidad Renteria PA-C  06/05/25 0640

## 2025-06-06 ENCOUNTER — TELEPHONE (OUTPATIENT)
Dept: NEPHROLOGY | Facility: CLINIC | Age: 70
End: 2025-06-06

## 2025-06-06 DIAGNOSIS — K55.1 STENOSIS OF INFERIOR MESENTERIC ARTERY (HCC): ICD-10-CM

## 2025-06-06 DIAGNOSIS — R80.9 MICROALBUMINURIA: ICD-10-CM

## 2025-06-06 DIAGNOSIS — I10 ESSENTIAL HYPERTENSION: Primary | ICD-10-CM

## 2025-06-06 DIAGNOSIS — E55.9 VITAMIN D DEFICIENCY: ICD-10-CM

## 2025-06-16 ENCOUNTER — TELEPHONE (OUTPATIENT)
Dept: NEPHROLOGY | Facility: CLINIC | Age: 70
End: 2025-06-16

## 2025-06-16 DIAGNOSIS — E55.9 VITAMIN D DEFICIENCY: ICD-10-CM

## 2025-06-16 DIAGNOSIS — K59.00 CONSTIPATION, UNSPECIFIED CONSTIPATION TYPE: ICD-10-CM

## 2025-06-16 RX ORDER — POLYETHYLENE GLYCOL 3350 17 G/17G
POWDER, FOR SOLUTION ORAL
Qty: 510 G | Refills: 0 | Status: SHIPPED | OUTPATIENT
Start: 2025-06-16

## 2025-06-16 RX ORDER — ERGOCALCIFEROL 1.25 MG/1
50000 CAPSULE, LIQUID FILLED ORAL WEEKLY
Qty: 4 CAPSULE | Refills: 0 | Status: SHIPPED | OUTPATIENT
Start: 2025-06-16

## 2025-06-16 NOTE — TELEPHONE ENCOUNTER
was a no show 6/12 for follow up in 2 weeks. Courtesy refill provided. Active labs dated 6/6/25. patient of Dr. Castañeda AO-     Called and left a voicemail to reschedule. I will mail a letter/ MyChart message.

## 2025-07-05 DIAGNOSIS — E55.9 VITAMIN D DEFICIENCY: ICD-10-CM

## 2025-07-05 DIAGNOSIS — I10 ESSENTIAL HYPERTENSION: ICD-10-CM

## 2025-07-07 RX ORDER — AMLODIPINE BESYLATE 5 MG/1
5 TABLET ORAL 2 TIMES DAILY
Qty: 180 TABLET | Refills: 1 | OUTPATIENT
Start: 2025-07-07

## 2025-07-07 NOTE — TELEPHONE ENCOUNTER
Patient was seen more than a year ago in the office.  He was a no-show for his appointment in June 2025.  He needs to contact PCP for refill or need to make appointment with our office in order for us to continue with refills.

## 2025-07-08 RX ORDER — ERGOCALCIFEROL 1.25 MG/1
50000 CAPSULE, LIQUID FILLED ORAL WEEKLY
Qty: 4 CAPSULE | Refills: 1 | Status: SHIPPED | OUTPATIENT
Start: 2025-07-08

## 2025-07-14 DIAGNOSIS — K59.00 CONSTIPATION, UNSPECIFIED CONSTIPATION TYPE: ICD-10-CM

## 2025-07-15 ENCOUNTER — TELEPHONE (OUTPATIENT)
Dept: FAMILY MEDICINE CLINIC | Facility: CLINIC | Age: 70
End: 2025-07-15

## 2025-07-15 ENCOUNTER — TELEPHONE (OUTPATIENT)
Age: 70
End: 2025-07-15

## 2025-07-15 RX ORDER — POLYETHYLENE GLYCOL 3350 17 G/17G
POWDER, FOR SOLUTION ORAL
Qty: 510 G | Refills: 0 | Status: SHIPPED | OUTPATIENT
Start: 2025-07-15

## 2025-07-22 NOTE — ASSESSMENT & PLAN NOTE
Results for orders placed during the hospital encounter of 06/26/24    Echo complete w/ contrast if indicated      Left Ventricle: Left ventricular cavity size is normal. Wall thickness is increased. There is mild to moderate concentric hypertrophy. The left ventricular ejection fraction is 71%. Systolic function is vigorous. Wall motion is normal. Diastolic function is normal.    Currently follows with cardiology for hypertensive heart disease.    Plan:  Follow-up with cardiology for cardiac clearance  Continue blood pressure management with lisinopril 40 mg and hydrochlorothiazide 25 mg

## 2025-07-22 NOTE — ASSESSMENT & PLAN NOTE
Patient was previously taking Mounjaro however has not taken it because of side effects of constipation

## 2025-07-22 NOTE — ASSESSMENT & PLAN NOTE
BP Readings from Last 3 Encounters:   07/23/25 (!) 180/70   06/05/25 (!) 171/90   02/12/25 (!) 196/70     Currently uncontrolled despite use of amlodipine 5 mg twice daily, chlorthalidone 20, losartan 50 mg daily, lisinopril 40 mg daily  Patient follows up with nephrology however has history of several no-shows, needs to reestablish with nephrology as they have stopped giving refills.    Reports several changes to meds since coming to the office.          Patient reports only taking the lisinopril and amlodopine at the evening time.   Does not recall taking any chlorthalidone.   He never got the losartan.   Current medications:  losartan 50 QD, lisinopril 40 mg daily, amlodipine 5 gm BID, chlorthalidone 25 mg daily.   Compliant     Plan:   Continue current regimen  Patient instructed to decrease consumption of fried/process/ fast foods and increase whole food intake   Goal of 3-5 servings of vegetables per day   Minimize salt to < 2g per day   Portion control   Goal of exercise 150 minutes per week of exercise, or 30 minute of brisk exercise 5x/ week but increase activity slowly (SMART goal)  to get to goal.   Follow-up with nephrology, follow-up with cardiology

## 2025-07-22 NOTE — PROGRESS NOTES
Pre-operative Clearance  Name: Shaw Lee      : 1955      MRN: [id number]  Encounter Provider: Masha Arnold MD  Encounter Date: 2025   Encounter department: [facility]    :  Assessment & Plan  Preop examination  Needs to be seen by nephrology and cardiology   Needs cardiology clearance   Is not cleared today from our standpoint due to uncontrolled blood pressure     Plan:   will reach out to Dr. Slater concerning scheduling an earlier appointment, prior to surgery scheduled on 25     Orders:    POCT ECG    Hypertensive heart disease without heart failure  Results for orders placed during the hospital encounter of 24    Echo complete w/ contrast if indicated      Left Ventricle: Left ventricular cavity size is normal. Wall thickness is increased. There is mild to moderate concentric hypertrophy. The left ventricular ejection fraction is 71%. Systolic function is vigorous. Wall motion is normal. Diastolic function is normal.    Currently follows with cardiology for hypertensive heart disease.    Plan:  Follow-up with cardiology for cardiac clearance  Continue blood pressure management with lisinopril 40 mg and hydrochlorothiazide 25 mg            ALEK (obstructive sleep apnea)  Patient denies any previous use of CPAP or BiPAP  Patient denies snoring or issues sleeping       BMI 40.0-44.9, adult (HCC)  Patient was previously taking Mounjaro however has not taken it because of side effects of constipation             Prediabetes  Lab Results   Component Value Date    HGBA1C 6.1 2025     Currently not taking any antidiabetic medications  Last A1c 6.5, currently controlled for age and comorbidities  Improving     Orders:    POCT hemoglobin A1c      Resistant hypertension  BP Readings from Last 3 Encounters:   25 (!) 180/70   25 (!) 171/90   25 (!) 196/70     Currently uncontrolled despite use of amlodipine 5 mg twice daily,  chlorthalidone 20, losartan 50 mg daily, lisinopril 40 mg daily  Patient follows up with nephrology however has history of several no-shows, needs to reestablish with nephrology as they have stopped giving refills.    Reports several changes to meds since coming to the office.          Patient reports only taking the lisinopril and amlodopine at the evening time.   Does not recall taking any chlorthalidone.   He never got the losartan.   Current medications:  losartan 50 QD, lisinopril 40 mg daily, amlodipine 5 gm BID, chlorthalidone 25 mg daily.   Compliant     Plan:   Continue current regimen  Patient instructed to decrease consumption of fried/process/ fast foods and increase whole food intake   Goal of 3-5 servings of vegetables per day   Minimize salt to < 2g per day   Portion control   Goal of exercise 150 minutes per week of exercise, or 30 minute of brisk exercise 5x/ week but increase activity slowly (SMART goal)  to get to goal.   Follow-up with nephrology, follow-up with cardiology                    Pre-operative Clearance:     Medication Instructions:   - Avoid aspirin containing medications or non-steroidal anti-inflammatory drugs one week preceding surgery    - May take tylenol for pain up until the night before surgery    - ACE Inhibitors or ARBs: Continue this medication up to the evening before surgery/procedure, but do not take the morning of the day of surgery.  - Calcium channel blockers: Continue to take this medication on your normal schedule.  - Diuretics: Continue taking this medication up to the evening before surgery/procedure, but do not take in the morning of the day of surgery/procedure.  - Hyperlipidemia meds: Continue to take this medication on your normal schedule.    BMI Counseling: Body mass index is 44.6 kg/m². The BMI is above normal. Nutrition recommendations include decreasing portion sizes. Exercise recommendations include moderate physical activity 150 minutes/week. Rationale  for BMI follow-up plan is due to patient being overweight or obese.       History of Present Illness     Pre-Op Examination     Surgery: LAPAROSCOPIC CHOLECYSTECTOMY WITH ICG,   Anticipated Date of Surgery: 8/21/25   Surgeon: Simone Noe, DO     Patient with history of resistant hypertension, prediabetes, morbid obesity comes to the clinic today for preop evaluation.     Previous history of bleeding disorders or clots?: No    Previous Anesthesia reaction?: No    Prolonged steroid use in the last 6 months?: No      Assessment of Cardiac Risk:   - Unstable or severe angina or MI in the last 6 weeks or history of stent placement in the last year?: No    - Decompensated heart failure (e.g. New onset heart failure, NYHA  Class IV heart failure, or worsening existing heart failure)?: No    - Significant arrhythmias such as high grade AV block, symptomatic ventricular arrhythmia, newly recognized ventricular tachycardia, supraventricular tachycardia with resting heart rate >100, or symptomatic bradycardia?: No    - Severe heart valve disease including aortic stenosis or symptomatic mitral stenosis?: No      Pre-operative Risk Factors:    - History of cerebrovascular disease: No    - History of ischemic heart disease: No    - History of congestive heart failure: No    - Pre-operative treatment with insulin: No    - Pre-operative creatinine >2 mg/dL: No      Medications of Perioperative Concern:    calcium channel blockers and ACE inhibitors/ARBs    Review of Systems   Constitutional:  Negative for chills and fever.   Respiratory:  Negative for cough, choking, shortness of breath and wheezing.    Cardiovascular:  Negative for chest pain and palpitations.   Gastrointestinal:  Negative for abdominal pain, blood in stool, constipation, diarrhea and nausea.   Genitourinary:  Negative for dysuria and flank pain.   Neurological:  Negative for dizziness, syncope and weakness.     Past Medical History   Past Medical  "History[1]  Past Surgical History[2]  Family History[3]  Social History[4]  Medications[5]  No Active Allergies    Objective   BP (!) 180/70 (BP Location: Left arm, Patient Position: Sitting)   Pulse 62   Temp (!) 97.2 °F (36.2 °C) (Temporal)   Resp 16   Ht 5' 9\" (1.753 m)   Wt (!) 137 kg (302 lb)   SpO2 98%   BMI 44.60 kg/m²     Physical Exam  Constitutional:       General: He is not in acute distress.     Appearance: Normal appearance. He is obese. He is not ill-appearing or toxic-appearing.   HENT:      Head: Normocephalic and atraumatic.      Right Ear: External ear normal.      Left Ear: External ear normal.      Nose: Nose normal. No congestion.      Mouth/Throat:      Mouth: Mucous membranes are moist.      Pharynx: No oropharyngeal exudate.     Eyes:      General:         Right eye: No discharge.         Left eye: No discharge.      Extraocular Movements: Extraocular movements intact.      Conjunctiva/sclera: Conjunctivae normal.       Cardiovascular:      Rate and Rhythm: Normal rate and regular rhythm.      Pulses: Normal pulses.      Heart sounds: Normal heart sounds.   Pulmonary:      Effort: Pulmonary effort is normal. No respiratory distress.      Breath sounds: Normal breath sounds. No stridor. No wheezing, rhonchi or rales.   Abdominal:      General: Abdomen is flat.      Tenderness: There is no abdominal tenderness.      Comments: Right upper quadrant: Percutaneous drain seen, clean dressing, draining bilious fluid.  No signs of infection or pain on palpation     Musculoskeletal:      Cervical back: Normal range of motion and neck supple.     Skin:     General: Skin is warm and dry.      Capillary Refill: Capillary refill takes less than 2 seconds.     Neurological:      General: No focal deficit present.      Mental Status: He is alert and oriented to person, place, and time.           Masha Arnold MD         [1]   Past Medical History:  Diagnosis Date    Hypertension     Prediabetes  "   [2] No past surgical history on file.  [3]   Family History  Problem Relation Name Age of Onset    Hypertension Mother      Coronary artery disease Mother      Hypertension Father      Coronary artery disease Father      Hypertension Sister      Coronary artery disease Sister      Hypertension Brother      Coronary artery disease Brother      Hypertension Paternal Uncle      Stroke Paternal Uncle     [4]   Social History  Tobacco Use    Smoking status: Never    Smokeless tobacco: Never    Tobacco comments:     only tried one cigarette when teenager   Vaping Use    Vaping status: Never Used   Substance and Sexual Activity    Alcohol use: Never    Drug use: Never    Sexual activity: Not Currently   [5]   Current Outpatient Medications on File Prior to Visit   Medication Sig    ergocalciferol (VITAMIN D2) 50,000 units TAKE 1 CAPSULE BY MOUTH EVERY WEEK    polyethylene glycol (GLYCOLAX) 17 GM/SCOOP powder DISSOLVE 17 GRAMS(ONE CAPFUL) IN 8 OUNCE OF WATER/JUICE & TAKE BY MOUTH DAILY AS DIRECTED *MAXIMUM ONE DOSE DAILY*    [DISCONTINUED] nadolol (CORGARD) 80 MG tablet TAKE ONE TABLET BY MOUTH ONCE DAILY

## 2025-07-22 NOTE — ASSESSMENT & PLAN NOTE
Lab Results   Component Value Date    HGBA1C 6.1 07/23/2025     Currently not taking any antidiabetic medications  Last A1c 6.5, currently controlled for age and comorbidities  Improving     Orders:    POCT hemoglobin A1c

## 2025-07-23 ENCOUNTER — CONSULT (OUTPATIENT)
Dept: FAMILY MEDICINE CLINIC | Facility: CLINIC | Age: 70
End: 2025-07-23

## 2025-07-23 ENCOUNTER — TELEPHONE (OUTPATIENT)
Dept: FAMILY MEDICINE CLINIC | Facility: CLINIC | Age: 70
End: 2025-07-23

## 2025-07-23 VITALS
OXYGEN SATURATION: 98 % | HEART RATE: 62 BPM | TEMPERATURE: 97.2 F | HEIGHT: 69 IN | WEIGHT: 302 LBS | DIASTOLIC BLOOD PRESSURE: 70 MMHG | BODY MASS INDEX: 44.73 KG/M2 | SYSTOLIC BLOOD PRESSURE: 180 MMHG | RESPIRATION RATE: 16 BRPM

## 2025-07-23 DIAGNOSIS — R73.03 PREDIABETES: ICD-10-CM

## 2025-07-23 DIAGNOSIS — I10 ESSENTIAL HYPERTENSION: Primary | ICD-10-CM

## 2025-07-23 DIAGNOSIS — I1A.0 RESISTANT HYPERTENSION: ICD-10-CM

## 2025-07-23 DIAGNOSIS — I10 ESSENTIAL HYPERTENSION: ICD-10-CM

## 2025-07-23 DIAGNOSIS — Z01.818 PREOP EXAMINATION: Primary | ICD-10-CM

## 2025-07-23 DIAGNOSIS — G47.33 OSA (OBSTRUCTIVE SLEEP APNEA): ICD-10-CM

## 2025-07-23 DIAGNOSIS — I11.9 HYPERTENSIVE HEART DISEASE WITHOUT HEART FAILURE: ICD-10-CM

## 2025-07-23 LAB — SL AMB POCT HEMOGLOBIN AIC: 6.1 (ref ?–6.5)

## 2025-07-23 PROCEDURE — 99214 OFFICE O/P EST MOD 30 MIN: CPT | Performed by: FAMILY MEDICINE

## 2025-07-23 PROCEDURE — 83036 HEMOGLOBIN GLYCOSYLATED A1C: CPT | Performed by: FAMILY MEDICINE

## 2025-07-23 RX ORDER — ROSUVASTATIN CALCIUM 10 MG/1
10 TABLET, COATED ORAL DAILY
COMMUNITY
Start: 2025-06-27 | End: 2025-07-23 | Stop reason: SDUPTHER

## 2025-07-23 RX ORDER — ROSUVASTATIN CALCIUM 10 MG/1
10 TABLET, COATED ORAL DAILY
Qty: 30 TABLET | Refills: 3 | Status: SHIPPED | OUTPATIENT
Start: 2025-07-23

## 2025-07-23 RX ORDER — CHLORTHALIDONE 25 MG/1
25 TABLET ORAL DAILY
Qty: 30 TABLET | Refills: 2 | Status: SHIPPED | OUTPATIENT
Start: 2025-07-23 | End: 2025-07-23 | Stop reason: SDUPTHER

## 2025-07-23 RX ORDER — CHLORTHALIDONE 25 MG/1
25 TABLET ORAL DAILY
Qty: 30 TABLET | Refills: 3 | Status: SHIPPED | OUTPATIENT
Start: 2025-07-23 | End: 2025-08-22

## 2025-07-23 RX ORDER — LISINOPRIL 40 MG/1
40 TABLET ORAL DAILY
Qty: 30 TABLET | Refills: 3 | Status: SHIPPED | OUTPATIENT
Start: 2025-07-23

## 2025-07-23 RX ORDER — ACETAMINOPHEN 160 MG
2000 TABLET,DISINTEGRATING ORAL DAILY
COMMUNITY
Start: 2025-07-07

## 2025-07-23 RX ORDER — AMLODIPINE BESYLATE 5 MG/1
5 TABLET ORAL 2 TIMES DAILY
Qty: 60 TABLET | Refills: 3 | Status: SHIPPED | OUTPATIENT
Start: 2025-07-23

## 2025-07-23 NOTE — PATIENT INSTRUCTIONS
Please take take your amlodipine 5 mg and chlorthalidone 25 mg in the morning   Then take your amlodipine 5 mg and lisinopril 40 mg at night     CONTINUE TO TAKE LOG OF Blood pressure  MORNING AND NIGHT       If interested in FREE monthly pill packing to organize your medications, please contact one of the below local pharmacies:   -[facility] pharmacy [phone]    These pharmacies can also deliver your medications directly to your home!    Please have your insurance card and medication list available when you call. The pharmacy can then transfer your prescriptions from your current pharmacy, or can obtain new ones from your doctor.       TAKE THE MORNING READINGS BEFORE ANY MEDICATIONS AND WHEN YOU ARE RELAXED FOR SEVERAL MINUTES  AVOID MEASURING BP WITHIN 30 MINUTES OF SMOKING   TAKE THE EVENING READINGS BETWEEN 7PM AND 10PM AT LEAST 30 MINUTES BEFORE OR AFTER DINNER/EATING/COFFEE INTAKE OR ALCOHOL INTAKE, AND CERTAINLY BEFORE ANY BLOOD PRESSURE MEDICATIONS.  AGAIN, PLEASE MAKE SURE YOU ARE RELAXED FOR SEVERAL MINUTES BEFORE TAKING HER BLOOD PRESSURE READINGS  PLEASE INCLUDE HEART RATE WITH YOUR BLOOD PRESSURE READINGS  Make sure feet are on the ground and the arm is at the heart level      Thank you,

## 2025-07-23 NOTE — TELEPHONE ENCOUNTER
"Saw pt with PCP    Confusion on BP management and changing meds - both last cardiology note and nephrology note from 2024 contain med regimens that pt is no longer taking     Last [facility] note contains BOTH diltiazem and amlodipine on med list. Unclear which statin pt is taking as pravastatin and rosuvastatin both documented. Needs med rec clarifications.     Reviewed current bp meds    Dr. Arnold wants to restart chlorthalidone, agree  Restart amlodipine, stop diltiazem  Restart rosuvastatin, stop pravastatin    Contacted [facility] pharmacy and coordinated bubble pill-packing for pt - they cannot start till August     Sent over \"offical\" bubble pack list with comments to pharamcy. Pharmacy confirmed verbally they will full this list    Pharmacist Tracking Tool  Reason For Outreach: Embedded Pharmacist  Demographics:  Intervention Method: In Person  Type of Intervention: New  Topics Addressed: Hypertension  Pharmacologic Interventions: Medication Initiation, Medication Discontinuation, Medication Conversion, Prevent or Manage CAROL, and Med Rec  Non-Pharmacologic Interventions: Adherence addressed, Care coordination, Chart update, Cost, Disease state education, Home Monitoring, and Medication Monitoring  Time:  Direct Patient Care: 10 mins  Care Coordination: 20 mins  Recommendation Recipient: Patient/Caregiver and Provider  Outcome: Accepted     Pradeep Nathan, PharmD, BCACP  Ambulatory Care Clinical Pharmacist             "

## 2025-07-23 NOTE — ASSESSMENT & PLAN NOTE
Needs to be seen by nephrology and cardiology   Needs cardiology clearance   Is not cleared today from our standpoint due to uncontrolled blood pressure     Plan:   will reach out to Dr. Slater concerning scheduling an earlier appointment, prior to surgery scheduled on 8/21/25     Orders:    POCT ECG

## 2025-07-24 PROCEDURE — 93000 ELECTROCARDIOGRAM COMPLETE: CPT | Performed by: FAMILY MEDICINE

## 2025-08-07 DIAGNOSIS — K59.00 CONSTIPATION, UNSPECIFIED CONSTIPATION TYPE: ICD-10-CM

## 2025-08-07 RX ORDER — POLYETHYLENE GLYCOL 3350 17 G/17G
POWDER, FOR SOLUTION ORAL
Qty: 510 G | Refills: 0 | Status: SHIPPED | OUTPATIENT
Start: 2025-08-07

## 2025-08-16 PROBLEM — Z01.810 PREOPERATIVE CARDIOVASCULAR EXAMINATION: Status: ACTIVE | Noted: 2025-08-16

## 2025-08-18 ENCOUNTER — OFFICE VISIT (OUTPATIENT)
Dept: CARDIOLOGY CLINIC | Facility: CLINIC | Age: 70
End: 2025-08-18
Payer: COMMERCIAL

## 2025-08-18 VITALS
HEART RATE: 71 BPM | SYSTOLIC BLOOD PRESSURE: 152 MMHG | BODY MASS INDEX: 45.47 KG/M2 | DIASTOLIC BLOOD PRESSURE: 86 MMHG | HEIGHT: 69 IN | WEIGHT: 307 LBS

## 2025-08-18 DIAGNOSIS — I11.9 HYPERTENSIVE HEART DISEASE WITHOUT HEART FAILURE: ICD-10-CM

## 2025-08-18 DIAGNOSIS — G47.33 OSA (OBSTRUCTIVE SLEEP APNEA): ICD-10-CM

## 2025-08-18 DIAGNOSIS — R73.03 PREDIABETES: ICD-10-CM

## 2025-08-18 DIAGNOSIS — Z01.810 PREOPERATIVE CARDIOVASCULAR EXAMINATION: Primary | ICD-10-CM

## 2025-08-18 PROCEDURE — G2211 COMPLEX E/M VISIT ADD ON: HCPCS

## 2025-08-18 PROCEDURE — 93000 ELECTROCARDIOGRAM COMPLETE: CPT

## 2025-08-18 PROCEDURE — 99214 OFFICE O/P EST MOD 30 MIN: CPT

## 2025-08-18 RX ORDER — DILTIAZEM HYDROCHLORIDE 120 MG/1
120 CAPSULE, EXTENDED RELEASE ORAL DAILY
COMMUNITY
Start: 2025-08-15

## 2025-08-19 ENCOUNTER — TELEPHONE (OUTPATIENT)
Age: 70
End: 2025-08-19